# Patient Record
Sex: FEMALE | Race: ASIAN | NOT HISPANIC OR LATINO
[De-identification: names, ages, dates, MRNs, and addresses within clinical notes are randomized per-mention and may not be internally consistent; named-entity substitution may affect disease eponyms.]

---

## 2017-05-12 ENCOUNTER — APPOINTMENT (OUTPATIENT)
Dept: ELECTROPHYSIOLOGY | Facility: CLINIC | Age: 67
End: 2017-05-12

## 2017-05-15 ENCOUNTER — NON-APPOINTMENT (OUTPATIENT)
Age: 67
End: 2017-05-15

## 2017-05-15 ENCOUNTER — APPOINTMENT (OUTPATIENT)
Dept: ELECTROPHYSIOLOGY | Facility: CLINIC | Age: 67
End: 2017-05-15

## 2017-05-15 VITALS
HEART RATE: 66 BPM | SYSTOLIC BLOOD PRESSURE: 132 MMHG | OXYGEN SATURATION: 95 % | HEIGHT: 63 IN | DIASTOLIC BLOOD PRESSURE: 82 MMHG

## 2018-12-10 ENCOUNTER — APPOINTMENT (OUTPATIENT)
Dept: ELECTROPHYSIOLOGY | Facility: CLINIC | Age: 68
End: 2018-12-10

## 2019-01-02 ENCOUNTER — APPOINTMENT (OUTPATIENT)
Dept: ELECTROPHYSIOLOGY | Facility: CLINIC | Age: 69
End: 2019-01-02
Payer: COMMERCIAL

## 2019-01-02 VITALS
SYSTOLIC BLOOD PRESSURE: 137 MMHG | HEIGHT: 63 IN | WEIGHT: 217 LBS | BODY MASS INDEX: 38.45 KG/M2 | DIASTOLIC BLOOD PRESSURE: 80 MMHG | HEART RATE: 60 BPM | OXYGEN SATURATION: 97 %

## 2019-01-02 PROCEDURE — 99214 OFFICE O/P EST MOD 30 MIN: CPT

## 2019-01-02 PROCEDURE — 93289 INTERROG DEVICE EVAL HEART: CPT

## 2019-01-02 RX ORDER — METOPROLOL SUCCINATE 100 MG/1
100 TABLET, EXTENDED RELEASE ORAL DAILY
Refills: 0 | Status: ACTIVE | COMMUNITY

## 2019-01-02 RX ORDER — ENALAPRIL MALEATE 10 MG/1
10 TABLET ORAL TWICE DAILY
Qty: 180 | Refills: 3 | Status: ACTIVE | COMMUNITY

## 2019-01-02 RX ORDER — ISOSORBIDE DINITRATE 20 MG/1
20 TABLET ORAL
Qty: 270 | Refills: 3 | Status: ACTIVE | COMMUNITY

## 2019-01-02 RX ORDER — LINAGLIPTIN 5 MG/1
5 TABLET, FILM COATED ORAL DAILY
Refills: 0 | Status: ACTIVE | COMMUNITY

## 2019-01-02 RX ORDER — LEVOTHYROXINE SODIUM 0.03 MG/1
25 TABLET ORAL DAILY
Qty: 90 | Refills: 3 | Status: ACTIVE | COMMUNITY

## 2019-01-02 NOTE — REVIEW OF SYSTEMS
[Feeling Fatigued] : feeling fatigued [see HPI] : see HPI [Anxiety] : anxiety [Negative] : Heme/Lymph

## 2019-01-02 NOTE — PHYSICAL EXAM
[General Appearance - Well Developed] : well developed [General Appearance - Well Nourished] : well nourished [Normal Conjunctiva] : the conjunctiva exhibited no abnormalities [No Oral Pallor] : no oral pallor [No Oral Cyanosis] : no oral cyanosis [Normal Jugular Venous V Waves Present] : normal jugular venous V waves present [Respiration, Rhythm And Depth] : normal respiratory rhythm and effort [Exaggerated Use Of Accessory Muscles For Inspiration] : no accessory muscle use [Auscultation Breath Sounds / Voice Sounds] : lungs were clear to auscultation bilaterally [Heart Rate And Rhythm] : heart rate and rhythm were normal [Heart Sounds] : normal S1 and S2 [Murmurs] : no murmurs present [Arterial Pulses Normal] : the arterial pulses were normal [Bowel Sounds] : normal bowel sounds [Abdomen Soft] : soft [Abdomen Tenderness] : non-tender [Abnormal Walk] : normal gait [Gait - Sufficient For Exercise Testing] : the gait was sufficient for exercise testing [Nail Clubbing] : no clubbing of the fingernails [Cyanosis, Localized] : no localized cyanosis [Skin Color & Pigmentation] : normal skin color and pigmentation [] : no rash [Oriented To Time, Place, And Person] : oriented to person, place, and time [No Anxiety] : not feeling anxious [FreeTextEntry1] : 2+ carotids

## 2019-01-02 NOTE — DISCUSSION/SUMMARY
[FreeTextEntry1] : Ms. Caro is a 68 year old female with a history of  coronary artery disease status post PCI, MI, HTN, HLD, and sustained VT s/p ICD (2/2013) for secondary prevention, status post lead revision. \par Interrogation of her device today shows normal function. Software upgrade was performed today for early alert in the event of a premature battery issue. She remains with her own intrinsic rhythm without appropriate therapy in many years. She will follow up in 3 months.

## 2019-04-04 ENCOUNTER — APPOINTMENT (OUTPATIENT)
Dept: ELECTROPHYSIOLOGY | Facility: CLINIC | Age: 69
End: 2019-04-04

## 2020-02-09 ENCOUNTER — INPATIENT (INPATIENT)
Facility: HOSPITAL | Age: 70
LOS: 1 days | Discharge: HOME | DRG: 247 | End: 2020-02-11
Attending: INTERNAL MEDICINE | Admitting: INTERNAL MEDICINE
Payer: MEDICARE

## 2020-02-09 VITALS
WEIGHT: 220.9 LBS | RESPIRATION RATE: 16 BRPM | OXYGEN SATURATION: 96 % | HEART RATE: 61 BPM | TEMPERATURE: 98 F | DIASTOLIC BLOOD PRESSURE: 83 MMHG | SYSTOLIC BLOOD PRESSURE: 189 MMHG

## 2020-02-09 DIAGNOSIS — E11.9 TYPE 2 DIABETES MELLITUS WITHOUT COMPLICATIONS: ICD-10-CM

## 2020-02-09 DIAGNOSIS — Z95.5 PRESENCE OF CORONARY ANGIOPLASTY IMPLANT AND GRAFT: Chronic | ICD-10-CM

## 2020-02-09 DIAGNOSIS — Z29.9 ENCOUNTER FOR PROPHYLACTIC MEASURES, UNSPECIFIED: ICD-10-CM

## 2020-02-09 DIAGNOSIS — R07.9 CHEST PAIN, UNSPECIFIED: ICD-10-CM

## 2020-02-09 DIAGNOSIS — E03.9 HYPOTHYROIDISM, UNSPECIFIED: ICD-10-CM

## 2020-02-09 DIAGNOSIS — I10 ESSENTIAL (PRIMARY) HYPERTENSION: ICD-10-CM

## 2020-02-09 DIAGNOSIS — Z91.89 OTHER SPECIFIED PERSONAL RISK FACTORS, NOT ELSEWHERE CLASSIFIED: ICD-10-CM

## 2020-02-09 DIAGNOSIS — I25.10 ATHEROSCLEROTIC HEART DISEASE OF NATIVE CORONARY ARTERY WITHOUT ANGINA PECTORIS: ICD-10-CM

## 2020-02-09 LAB
BLD GP AB SCN SERPL QL: NEGATIVE — SIGNIFICANT CHANGE UP
CHOLEST SERPL-MCNC: 170 MG/DL — SIGNIFICANT CHANGE UP (ref 10–199)
CK MB CFR SERPL CALC: 1.1 NG/ML — SIGNIFICANT CHANGE UP (ref 0–6.7)
CK SERPL-CCNC: 85 U/L — SIGNIFICANT CHANGE UP (ref 25–170)
GLUCOSE BLDC GLUCOMTR-MCNC: 145 MG/DL — HIGH (ref 70–99)
HBA1C BLD-MCNC: 7 % — HIGH (ref 4–5.6)
HDLC SERPL-MCNC: 78 MG/DL — SIGNIFICANT CHANGE UP
LIPID PNL WITH DIRECT LDL SERPL: 74 MG/DL — SIGNIFICANT CHANGE UP
NT-PROBNP SERPL-SCNC: 401 PG/ML — HIGH (ref 0–300)
RH IG SCN BLD-IMP: POSITIVE — SIGNIFICANT CHANGE UP
TOTAL CHOLESTEROL/HDL RATIO MEASUREMENT: 2.2 RATIO — LOW (ref 3.3–7.1)
TRIGL SERPL-MCNC: 90 MG/DL — SIGNIFICANT CHANGE UP (ref 10–149)
TROPONIN T SERPL-MCNC: <0.01 NG/ML — SIGNIFICANT CHANGE UP (ref 0–0.01)
TSH SERPL-MCNC: 4.47 UIU/ML — SIGNIFICANT CHANGE UP (ref 0.35–4.94)

## 2020-02-09 PROCEDURE — 99285 EMERGENCY DEPT VISIT HI MDM: CPT

## 2020-02-09 PROCEDURE — 71045 X-RAY EXAM CHEST 1 VIEW: CPT | Mod: 26

## 2020-02-09 PROCEDURE — 99221 1ST HOSP IP/OBS SF/LOW 40: CPT

## 2020-02-09 RX ORDER — DEXTROSE 50 % IN WATER 50 %
15 SYRINGE (ML) INTRAVENOUS ONCE
Refills: 0 | Status: DISCONTINUED | OUTPATIENT
Start: 2020-02-09 | End: 2020-02-11

## 2020-02-09 RX ORDER — DEXTROSE 50 % IN WATER 50 %
25 SYRINGE (ML) INTRAVENOUS ONCE
Refills: 0 | Status: DISCONTINUED | OUTPATIENT
Start: 2020-02-09 | End: 2020-02-11

## 2020-02-09 RX ORDER — OMEGA-3 ACID ETHYL ESTERS 1 G
1 CAPSULE ORAL DAILY
Refills: 0 | Status: DISCONTINUED | OUTPATIENT
Start: 2020-02-10 | End: 2020-02-11

## 2020-02-09 RX ORDER — DEXTROSE 50 % IN WATER 50 %
12.5 SYRINGE (ML) INTRAVENOUS ONCE
Refills: 0 | Status: DISCONTINUED | OUTPATIENT
Start: 2020-02-09 | End: 2020-02-11

## 2020-02-09 RX ORDER — NIFEDIPINE 30 MG
60 TABLET, EXTENDED RELEASE 24 HR ORAL DAILY
Refills: 0 | Status: DISCONTINUED | OUTPATIENT
Start: 2020-02-10 | End: 2020-02-11

## 2020-02-09 RX ORDER — ASPIRIN/CALCIUM CARB/MAGNESIUM 324 MG
325 TABLET ORAL ONCE
Refills: 0 | Status: COMPLETED | OUTPATIENT
Start: 2020-02-09 | End: 2020-02-09

## 2020-02-09 RX ORDER — PREGABALIN 225 MG/1
1000 CAPSULE ORAL DAILY
Refills: 0 | Status: DISCONTINUED | OUTPATIENT
Start: 2020-02-09 | End: 2020-02-11

## 2020-02-09 RX ORDER — GLUCAGON INJECTION, SOLUTION 0.5 MG/.1ML
1 INJECTION, SOLUTION SUBCUTANEOUS ONCE
Refills: 0 | Status: DISCONTINUED | OUTPATIENT
Start: 2020-02-09 | End: 2020-02-11

## 2020-02-09 RX ORDER — ENOXAPARIN SODIUM 100 MG/ML
40 INJECTION SUBCUTANEOUS EVERY 24 HOURS
Refills: 0 | Status: DISCONTINUED | OUTPATIENT
Start: 2020-02-09 | End: 2020-02-11

## 2020-02-09 RX ORDER — ASPIRIN/CALCIUM CARB/MAGNESIUM 324 MG
81 TABLET ORAL EVERY 24 HOURS
Refills: 0 | Status: DISCONTINUED | OUTPATIENT
Start: 2020-02-10 | End: 2020-02-11

## 2020-02-09 RX ORDER — SODIUM CHLORIDE 9 MG/ML
1000 INJECTION, SOLUTION INTRAVENOUS
Refills: 0 | Status: DISCONTINUED | OUTPATIENT
Start: 2020-02-09 | End: 2020-02-11

## 2020-02-09 RX ORDER — ISOSORBIDE MONONITRATE 60 MG/1
30 TABLET, EXTENDED RELEASE ORAL DAILY
Refills: 0 | Status: DISCONTINUED | OUTPATIENT
Start: 2020-02-09 | End: 2020-02-10

## 2020-02-09 RX ORDER — CLOPIDOGREL BISULFATE 75 MG/1
75 TABLET, FILM COATED ORAL DAILY
Refills: 0 | Status: DISCONTINUED | OUTPATIENT
Start: 2020-02-10 | End: 2020-02-11

## 2020-02-09 RX ORDER — ISOSORBIDE MONONITRATE 60 MG/1
30 TABLET, EXTENDED RELEASE ORAL DAILY
Refills: 0 | Status: DISCONTINUED | OUTPATIENT
Start: 2020-02-10 | End: 2020-02-11

## 2020-02-09 RX ORDER — ATORVASTATIN CALCIUM 80 MG/1
80 TABLET, FILM COATED ORAL AT BEDTIME
Refills: 0 | Status: DISCONTINUED | OUTPATIENT
Start: 2020-02-09 | End: 2020-02-11

## 2020-02-09 RX ORDER — NITROGLYCERIN 6.5 MG
0.4 CAPSULE, EXTENDED RELEASE ORAL
Refills: 0 | Status: DISCONTINUED | OUTPATIENT
Start: 2020-02-09 | End: 2020-02-10

## 2020-02-09 RX ORDER — CLOPIDOGREL BISULFATE 75 MG/1
600 TABLET, FILM COATED ORAL ONCE
Refills: 0 | Status: COMPLETED | OUTPATIENT
Start: 2020-02-09 | End: 2020-02-09

## 2020-02-09 RX ORDER — LEVOTHYROXINE SODIUM 125 MCG
25 TABLET ORAL
Refills: 0 | Status: DISCONTINUED | OUTPATIENT
Start: 2020-02-10 | End: 2020-02-11

## 2020-02-09 RX ORDER — CLOPIDOGREL BISULFATE 75 MG/1
600 TABLET, FILM COATED ORAL ONCE
Refills: 0 | Status: DISCONTINUED | OUTPATIENT
Start: 2020-02-09 | End: 2020-02-09

## 2020-02-09 RX ORDER — INSULIN LISPRO 100/ML
VIAL (ML) SUBCUTANEOUS
Refills: 0 | Status: DISCONTINUED | OUTPATIENT
Start: 2020-02-09 | End: 2020-02-11

## 2020-02-09 RX ORDER — METOPROLOL TARTRATE 50 MG
50 TABLET ORAL
Refills: 0 | Status: DISCONTINUED | OUTPATIENT
Start: 2020-02-10 | End: 2020-02-11

## 2020-02-09 RX ADMIN — ENOXAPARIN SODIUM 40 MILLIGRAM(S): 100 INJECTION SUBCUTANEOUS at 20:37

## 2020-02-09 RX ADMIN — ATORVASTATIN CALCIUM 80 MILLIGRAM(S): 80 TABLET, FILM COATED ORAL at 20:37

## 2020-02-09 RX ADMIN — Medication 325 MILLIGRAM(S): at 17:11

## 2020-02-09 RX ADMIN — Medication 0.4 MILLIGRAM(S): at 23:54

## 2020-02-09 RX ADMIN — CLOPIDOGREL BISULFATE 600 MILLIGRAM(S): 75 TABLET, FILM COATED ORAL at 23:09

## 2020-02-09 RX ADMIN — ISOSORBIDE MONONITRATE 30 MILLIGRAM(S): 60 TABLET, EXTENDED RELEASE ORAL at 23:09

## 2020-02-09 NOTE — H&P ADULT - PROBLEM SELECTOR PLAN 3
-c/w lovenox ppx    #FENGI  -DASH diet  -replete gudelia prn    #Dispo: pending stabilization    FULL CODE -holding home farxiga and metformin  -c/w MISS; will dose basal and premeal as needed  -f/u a1c

## 2020-02-09 NOTE — ED PROVIDER NOTE - CLINICAL SUMMARY MEDICAL DECISION MAKING FREE TEXT BOX
Pt w chest pain, none at present but w low exercise tolerance, cp relieve w nitro. Pt w hx of CAD w chest pain worsening on exertion, none at present on rest, cp relieve w nitro. will obtain cardiac evaluation and likely admit for further ACS evaluation.

## 2020-02-09 NOTE — H&P ADULT - PROBLEM SELECTOR PLAN 1
S/p ASA load for possible ACS; trops neg x1, ECG w/ TWI in ; CP currently resolved  -c/w SLN prn  -f/u trops/ECG  x2 S/p ASA load for possible ACS; trops neg x1, ECG w/ sinus joya, TWI in aVL and V2; CP currently resolved  -c/w SLN prn  -f/u trops/ECG  x2  -NPO at midnight for likely cath tomorrow given high pretest probability S/p ASA load for possible ACS; trops neg x1, ECG w/ sinus joya, TWI in aVL and V2; CP currently resolved  -c/w SLN prn  -c/w imdur 30mg qd  -f/u trops/ECG  x2  -NPO at midnight for likely cath tomorrow given high pretest probability S/p ASA load for possible ACS; trops neg x1, ECG w/ sinus joya, TWI in aVL and V2; CP currently resolved  -c/w SLN prn  -c/w imdur 30mg qd  -f/u trops/ECG  x2, BNP  -NPO at midnight for likely cath tomorrow given high pretest probability

## 2020-02-09 NOTE — H&P ADULT - PROBLEM SELECTOR PLAN 4
1) PCP Contacted on Admission: (N) --> Name & Phone #:  2) Date of Contact with PCP:  3) PCP Contacted at Discharge: (Y/N, N/A)  4) Summary of Handoff Given to PCP:   5) Post-Discharge Appointment Date and Location: -holding home ACE (enalapril 10mg qd) as pt to undergo cath  -holding home imdur 30mg ER as pt w/ SLN prn ordered  -c/w home nifedipine 60mg qd  -home toprol 100mg qd transitioned currently to lopressor 50mg bid -holding home ACE (enalapril 10mg qd) as pt to undergo cath  -c/w home nifedipine 60mg qd, imdur 30mg qd  -holding home toprol 100mg qd, continuing as lopressor 50mg bid

## 2020-02-09 NOTE — H&P ADULT - NSHPPHYSICALEXAM_GEN_ALL_CORE
VITAL SIGNS:  T(C): 36.6 (02-09-20 @ 18:43), Max: 36.6 (02-09-20 @ 18:43)  T(F): 97.8 (02-09-20 @ 18:43), Max: 97.8 (02-09-20 @ 18:43)  HR: 55 (02-09-20 @ 18:43) (55 - 61)  BP: 147/91 (02-09-20 @ 18:43) (147/91 - 189/83)  BP(mean): --  RR: 17 (02-09-20 @ 18:43) (16 - 17)  SpO2: 98% (02-09-20 @ 18:43) (96% - 98%)  Wt(kg): --    PHYSICAL EXAM:    Constitutional: WDWN resting comfortably in bed; NAD  Head: NC/AT  Eyes: PERRL, EOMI, anicteric sclera  ENT: no nasal discharge; uvula midline, no oropharyngeal erythema or exudates; MMM  Neck: supple; no JVD or thyromegaly  Respiratory: CTA B/L; no W/R/R, no retractions  Cardiac: +S1/S2; RRR; no M/R/G; PMI non-displaced  Gastrointestinal: soft, NT/ND; no rebound or guarding; +BSx4  Genitourinary: normal external genitalia  Back: spine midline, no bony tenderness or step-offs; no CVAT B/L  Extremities: WWP, no clubbing or cyanosis; no peripheral edema  Musculoskeletal: NROM x4; no joint swelling, tenderness or erythema  Vascular: 2+ radial, femoral, DP/PT pulses B/L  Dermatologic: skin warm, dry and intact; no rashes, wounds, or scars  Lymphatic: no submandibular or cervical LAD  Neurologic: AAOx3; CNII-XII grossly intact; no focal deficits  Psychiatric: affect and characteristics of appearance, verbalizations, behaviors are appropriate VITAL SIGNS:  T(C): 36.6 (02-09-20 @ 18:43), Max: 36.6 (02-09-20 @ 18:43)  T(F): 97.8 (02-09-20 @ 18:43), Max: 97.8 (02-09-20 @ 18:43)  HR: 55 (02-09-20 @ 18:43) (55 - 61)  BP: 147/91 (02-09-20 @ 18:43) (147/91 - 189/83)  BP(mean): --  RR: 17 (02-09-20 @ 18:43) (16 - 17)  SpO2: 98% (02-09-20 @ 18:43) (96% - 98%)  Wt(kg): --    PHYSICAL EXAM:    Constitutional: WDWN resting comfortably in bed; NAD  Head: NC/AT  Eyes: PERRL, EOMI, anicteric sclera  ENT: no nasal discharge; uvula midline, no oropharyngeal erythema or exudates; MMM  Neck: supple; no JVD or thyromegaly  Respiratory: CTA B/L; no W/R/R, no retractions  Cardiac: +S1/S2; RRR; no M/R/G, no JVD  Gastrointestinal: soft, NT/ND; no rebound or guarding; +BSx4  Back: spine midline, no bony tenderness or step-offs; no CVAT B/L  Extremities: WWP, no clubbing or cyanosis; no peripheral edema  Musculoskeletal: NROM x4; no joint swelling, tenderness or erythema  Vascular: 2+ radial, DP pulses B/L  Dermatologic: skin warm, dry and intact; no rashes, wounds, or scars  Lymphatic: no submandibular or cervical LAD  Neurologic: AAOx3; CNII-XII grossly intact; no focal deficits

## 2020-02-09 NOTE — H&P ADULT - HISTORY OF PRESENT ILLNESS
In the ED, T: 97.5, P: 61, BP: 189/83, RR: 16, O2: 96% RA. Trops neg x1. ECG w/ TWI in . Patient aspirin loaded and [facility]. Patient is a 70y F w/ PMH CAD (s/p 14-16 stents), HTN, DM2, hypothyroidism presenting w/ CP. Patient reports exertional CP for the last 3 days; L sided radiating to the jaw, relieved with nitrates or rest. Assoc w/ dyspnea. Denies f/c/n/v/d/c, dysuria, ab pain, recent travel, LE edema, sick contacts.     In the ED, T: 97.5, P: 61, BP: 189/83, RR: 16, O2: 96% RA. Trops neg x1. ECG w/ TWI in . Patient aspirin loaded and admitted to [facility]. Patient is a 70y F w/ PMH CAD (s/p 14-16 stents), HTN, DM2, hypothyroidism presenting w/ CP. Patient reports exertional CP for the last 3 days; L sided radiating to the jaw, relieved with nitrates or rest. Assoc w/ dyspnea. Denies f/c/n/v/d/c, dysuria, ab pain, recent travel, LE edema, sick contacts.     In the ED, T: 97.5, P: 61, BP: 189/83, RR: 16, O2: 96% RA. Trops neg x1. ECG w/ TWI in aVL and V2. Patient aspirin loaded and admitted to [facility].

## 2020-02-09 NOTE — H&P ADULT - ASSESSMENT
Patient is a 70y F w/ PMH CAD (s/p 14-16 stents), HTN, DM2 presenting w/ CP. Patient reports exertional CP for the last 3 days; L sided radiating to the jaw, relieved with nitrates or rest. Assoc w/ dyspnea. Denies f/c/n/v/d/c, dysuria, ab pain, recent travel, LE edema, sick contacts.

## 2020-02-09 NOTE — H&P ADULT - PROBLEM SELECTOR PLAN 6
-c/w lovenox ppx    #FENGI  -DASH/diabetic diet  -replete gudelia prn    #Dispo: pending stabilization    FULL CODE

## 2020-02-09 NOTE — H&P ADULT - NSHPLABSRESULTS_GEN_ALL_CORE
LABS:                        14.0   5.33  )-----------( 194      ( 09 Feb 2020 16:34 )             43.7     02-09    145  |  105  |  12  ----------------------------<  128<H>  4.6   |  25  |  0.70    Ca    9.4      09 Feb 2020 16:34    TPro  7.4  /  Alb  4.2  /  TBili  0.2  /  DBili  x   /  AST  28  /  ALT  26  /  AlkPhos  90  02-09    PT/INR - ( 09 Feb 2020 16:34 )   PT: 9.8 sec;   INR: 0.86          PTT - ( 09 Feb 2020 16:34 )  PTT:29.3 sec    CARDIAC MARKERS ( 09 Feb 2020 16:34 )  x     / <0.01 ng/mL / x     / x     / x        RADIOLOGY & ADDITIONAL TESTS:

## 2020-02-09 NOTE — H&P ADULT - PROBLEM SELECTOR PLAN 2
hx of multiple stents in the past  -c/w ASA qd, lipitor qd  -f/u lipid profile, a1c, TSH hx of multiple stents (14-16) in the past  -c/w ASA qd, lipitor qd, BB qd,   -f/u lipid profile, a1c, TSH hx of multiple stents (14-16) in the past  -c/w ASA qd, lipitor qd, BB qd, imdur qd  -f/u lipid profile, a1c, TSH

## 2020-02-09 NOTE — H&P ADULT - ATTENDING COMMENTS
On Date 2/9/20  Assessment: Patient personally seen and examined myself, face-to-face, during rounds with the Resident     Note read, including vitals, physical findings, laboratory data, and radiological reports.   Agree with above.

## 2020-02-09 NOTE — H&P ADULT - NSICDXPASTMEDICALHX_GEN_ALL_CORE_FT
PAST MEDICAL HISTORY:  CAD in native artery     DM2 (diabetes mellitus, type 2)     H/O: HTN (hypertension)     Hypothyroidism

## 2020-02-09 NOTE — ED ADULT NURSE NOTE - OBJECTIVE STATEMENT
pt a&ox3 resting in stretcher. cardiac monitor in place. nsr on monitor. pt family at bedside. pt reports cp x2 days. pain has increasingly worsened, and today when pt was trying to shower, it was unbearable. pt reports pain =9/10 with movement, 0/10 when still. pt denies trauma. denies sob, fevers, n,v,d. reports chest tightness. lungs clear b/l.

## 2020-02-09 NOTE — H&P ADULT - PROBLEM SELECTOR PLAN 7
1) PCP Contacted on Admission: (N) --> Name & Phone #: Dr. Dieudonne Arellano  2) Date of Contact with PCP:  3) PCP Contacted at Discharge: (Y/N, N/A)  4) Summary of Handoff Given to PCP:   5) Post-Discharge Appointment Date and Location: 1) PCP Contacted on Admission: (N) --> Name & Phone #: Dr. Dieudonne Arellano [phone]  2) Date of Contact with PCP:  3) PCP Contacted at Discharge: (Y/N, N/A)  4) Summary of Handoff Given to PCP:   5) Post-Discharge Appointment Date and Location:

## 2020-02-09 NOTE — ED PROVIDER NOTE - OBJECTIVE STATEMENT
71 yo hx CAD w 14 stents per pt w chest pain worsening over the last three days.  has been using nitro to relieve symptoms but today noted that pain after walking a few steps. no sob, leg swelling, cough, f/c, states compliance w medications. Denies associated SOB, NVD, lightheaded, diaphoresis, palpitations, cough/rhinorrhea, black/bloody stool, LE pain/swelling, focal weakness/numbness, recent travel/immobilization, abd pain, urinary complaints, f/c. No hormone use.  has had stents placed at [facility] previously.

## 2020-02-10 ENCOUNTER — TRANSCRIPTION ENCOUNTER (OUTPATIENT)
Age: 70
End: 2020-02-10

## 2020-02-10 DIAGNOSIS — Z86.79 PERSONAL HISTORY OF OTHER DISEASES OF THE CIRCULATORY SYSTEM: ICD-10-CM

## 2020-02-10 DIAGNOSIS — E11.9 TYPE 2 DIABETES MELLITUS WITHOUT COMPLICATIONS: ICD-10-CM

## 2020-02-10 DIAGNOSIS — I50.22 CHRONIC SYSTOLIC (CONGESTIVE) HEART FAILURE: ICD-10-CM

## 2020-02-10 DIAGNOSIS — E78.5 HYPERLIPIDEMIA, UNSPECIFIED: ICD-10-CM

## 2020-02-10 LAB
ANION GAP SERPL CALC-SCNC: 12 MMOL/L — SIGNIFICANT CHANGE UP (ref 5–17)
APTT BLD: 32 SEC — SIGNIFICANT CHANGE UP (ref 27.5–36.3)
BLD GP AB SCN SERPL QL: NEGATIVE — SIGNIFICANT CHANGE UP
BUN SERPL-MCNC: 14 MG/DL — SIGNIFICANT CHANGE UP (ref 7–23)
CALCIUM SERPL-MCNC: 8.8 MG/DL — SIGNIFICANT CHANGE UP (ref 8.4–10.5)
CHLORIDE SERPL-SCNC: 105 MMOL/L — SIGNIFICANT CHANGE UP (ref 96–108)
CO2 SERPL-SCNC: 27 MMOL/L — SIGNIFICANT CHANGE UP (ref 22–31)
CREAT SERPL-MCNC: 0.9 MG/DL — SIGNIFICANT CHANGE UP (ref 0.5–1.3)
GLUCOSE BLDC GLUCOMTR-MCNC: 111 MG/DL — HIGH (ref 70–99)
GLUCOSE BLDC GLUCOMTR-MCNC: 140 MG/DL — HIGH (ref 70–99)
GLUCOSE BLDC GLUCOMTR-MCNC: 144 MG/DL — HIGH (ref 70–99)
GLUCOSE BLDC GLUCOMTR-MCNC: 225 MG/DL — HIGH (ref 70–99)
GLUCOSE SERPL-MCNC: 152 MG/DL — HIGH (ref 70–99)
HCT VFR BLD CALC: 45 % — SIGNIFICANT CHANGE UP (ref 34.5–45)
HCV AB S/CO SERPL IA: 0.1 S/CO — SIGNIFICANT CHANGE UP
HCV AB SERPL-IMP: SIGNIFICANT CHANGE UP
HGB BLD-MCNC: 14.2 G/DL — SIGNIFICANT CHANGE UP (ref 11.5–15.5)
INR BLD: 0.86 — LOW (ref 0.88–1.16)
MAGNESIUM SERPL-MCNC: 2.2 MG/DL — SIGNIFICANT CHANGE UP (ref 1.6–2.6)
MCHC RBC-ENTMCNC: 28.4 PG — SIGNIFICANT CHANGE UP (ref 27–34)
MCHC RBC-ENTMCNC: 31.6 GM/DL — LOW (ref 32–36)
MCV RBC AUTO: 90 FL — SIGNIFICANT CHANGE UP (ref 80–100)
NRBC # BLD: 0 /100 WBCS — SIGNIFICANT CHANGE UP (ref 0–0)
PLATELET # BLD AUTO: 197 K/UL — SIGNIFICANT CHANGE UP (ref 150–400)
POTASSIUM SERPL-MCNC: 4.6 MMOL/L — SIGNIFICANT CHANGE UP (ref 3.5–5.3)
POTASSIUM SERPL-SCNC: 4.6 MMOL/L — SIGNIFICANT CHANGE UP (ref 3.5–5.3)
PROTHROM AB SERPL-ACNC: 9.7 SEC — LOW (ref 10–12.9)
RBC # BLD: 5 M/UL — SIGNIFICANT CHANGE UP (ref 3.8–5.2)
RBC # FLD: 14.2 % — SIGNIFICANT CHANGE UP (ref 10.3–14.5)
RH IG SCN BLD-IMP: POSITIVE — SIGNIFICANT CHANGE UP
SODIUM SERPL-SCNC: 144 MMOL/L — SIGNIFICANT CHANGE UP (ref 135–145)
WBC # BLD: 5.28 K/UL — SIGNIFICANT CHANGE UP (ref 3.8–10.5)
WBC # FLD AUTO: 5.28 K/UL — SIGNIFICANT CHANGE UP (ref 3.8–10.5)

## 2020-02-10 PROCEDURE — 71045 X-RAY EXAM CHEST 1 VIEW: CPT | Mod: 26

## 2020-02-10 PROCEDURE — 93010 ELECTROCARDIOGRAM REPORT: CPT

## 2020-02-10 PROCEDURE — 93459 L HRT ART/GRFT ANGIO: CPT | Mod: 26,59

## 2020-02-10 PROCEDURE — 99232 SBSQ HOSP IP/OBS MODERATE 35: CPT

## 2020-02-10 PROCEDURE — 92937 PRQ TRLUML REVSC CAB GRF 1: CPT | Mod: LC

## 2020-02-10 RX ORDER — SODIUM CHLORIDE 9 MG/ML
500 INJECTION INTRAMUSCULAR; INTRAVENOUS; SUBCUTANEOUS
Refills: 0 | Status: DISCONTINUED | OUTPATIENT
Start: 2020-02-10 | End: 2020-02-11

## 2020-02-10 RX ORDER — ACETAMINOPHEN 500 MG
650 TABLET ORAL ONCE
Refills: 0 | Status: COMPLETED | OUTPATIENT
Start: 2020-02-10 | End: 2020-02-10

## 2020-02-10 RX ORDER — SODIUM CHLORIDE 9 MG/ML
500 INJECTION INTRAMUSCULAR; INTRAVENOUS; SUBCUTANEOUS
Refills: 0 | Status: DISCONTINUED | OUTPATIENT
Start: 2020-02-10 | End: 2020-02-10

## 2020-02-10 RX ADMIN — SODIUM CHLORIDE 50 MILLILITER(S): 9 INJECTION INTRAMUSCULAR; INTRAVENOUS; SUBCUTANEOUS at 19:35

## 2020-02-10 RX ADMIN — ATORVASTATIN CALCIUM 80 MILLIGRAM(S): 80 TABLET, FILM COATED ORAL at 21:03

## 2020-02-10 RX ADMIN — Medication 4: at 22:12

## 2020-02-10 RX ADMIN — PREGABALIN 1000 MICROGRAM(S): 225 CAPSULE ORAL at 10:00

## 2020-02-10 RX ADMIN — Medication 650 MILLIGRAM(S): at 21:32

## 2020-02-10 RX ADMIN — Medication 60 MILLIGRAM(S): at 10:00

## 2020-02-10 RX ADMIN — SODIUM CHLORIDE 50 MILLILITER(S): 9 INJECTION INTRAMUSCULAR; INTRAVENOUS; SUBCUTANEOUS at 10:21

## 2020-02-10 RX ADMIN — ISOSORBIDE MONONITRATE 30 MILLIGRAM(S): 60 TABLET, EXTENDED RELEASE ORAL at 10:00

## 2020-02-10 RX ADMIN — Medication 1 GRAM(S): at 10:00

## 2020-02-10 RX ADMIN — Medication 650 MILLIGRAM(S): at 20:34

## 2020-02-10 RX ADMIN — Medication 25 MICROGRAM(S): at 06:22

## 2020-02-10 RX ADMIN — CLOPIDOGREL BISULFATE 75 MILLIGRAM(S): 75 TABLET, FILM COATED ORAL at 15:45

## 2020-02-10 RX ADMIN — Medication 50 MILLIGRAM(S): at 19:19

## 2020-02-10 RX ADMIN — Medication 50 MILLIGRAM(S): at 06:18

## 2020-02-10 RX ADMIN — Medication 81 MILLIGRAM(S): at 10:00

## 2020-02-10 NOTE — PROGRESS NOTE ADULT - PROBLEM SELECTOR PLAN 6
-Continue Synthroid 25 mcg daily.    VTE PPx: Lovenox SC daily (to start tomorrow given angiogram today    Dispo: F/U Cardiac Catheterization results and Echo    Case discussed with Dr. Martinez, patient and daughter. -Continue Synthroid 25 mcg daily.    VTE PPx: Lovenox SC daily     Dispo: F/U Cardiac Catheterization results and Echo    Case discussed with Dr. Martinez, patient and daughter.

## 2020-02-10 NOTE — DISCHARGE NOTE PROVIDER - PROVIDER TOKENS
FREE:[LAST:[Isa],FIRST:[Pritesh],PHONE:[phone]],FAX:[(   )    -],ADDRESS:[[address] [place] [address]  [phone]],FOLLOWUP:[1 week]]

## 2020-02-10 NOTE — DISCHARGE NOTE PROVIDER - HOSPITAL COURSE
69 yo Simone and English speaking female with a PMHx of HTN, hyperlipidemia, DMII, hypothyroidism, known CAD with prior 3VCABG 2013 (LIMA-LAD, SVG-OM1, SVG-RCA (all patent by angiogram 2013), multiple PCIs most recent intervention s/p MATTHEW proximal ramus 2/2016 @ [facility], chronic systolic CHF (no recent EF documented), history of VT s/p ICD (with most recent interrogation Friday 2/7/2020) presented to [facility] ED (2/9/20) with unstable angina with admission to [facility] for further work up. Patient’s troponin negative x2. Patient is now s/p cardiac catheterization (2/10/2020) revealing (INSERT).  Echo (2/11/2020) revealed (INSERT).         Patient seen and examined at bedside this morning. Patient with no complaints and is out of bed ambulating with no issues. Left radial access is stable with no hematoma, no bleed, 2+ radial pulse. Lab values, telemetry and vital signs reviewed and remained stable overnight. Discharge medication regimen reviewed with Dr. Martinez with plan for patient to take Aspirin 81 mg daily, Plavix 75 mg daily, Imdur 30 mg daily (May need to increase—confirm), Lopressor 50 mg orally BID, Procardia XL 60 mg daily, Enalapril 10 mg daily and Lipitor 80 mg daily. Patient has been cleared for discharge at this time and will follow up with Dr. Moss within 1-2 weeks for a post PCI check up. All discharge instructions have been reviewed with patient and medications have been e-prescribed to patient’s pharmacy. 71 yo Simone and English speaking female with a PMHx of HTN, hyperlipidemia, DMII, hypothyroidism, known CAD with prior 3VCABG 2013 (LIMA-LAD, SVG-OM1, SVG-RCA (all patent by angiogram 2013), multiple PCIs most recent intervention s/p MATTHEW proximal ramus 2/2016 @ [facility], chronic systolic CHF (no recent EF documented), history of VT s/p ICD (with most recent interrogation Friday 2/7/2020) presented to [facility] ED (2/9/20) with unstable angina with admission to [facility] for further work up. Patient’s troponin negative x2. Patient is now s/p cardiac catheterization (2/10/2020) revealing (INSERT).  Echo (2/11/2020) revealed (INSERT).         Patient seen and examined at bedside this morning. Patient with no complaints and is out of bed ambulating with no issues. Left radial access is stable with no hematoma, no bleed, 2+ radial pulse. Lab values, telemetry and vital signs reviewed and remained stable overnight. Discharge medication regimen reviewed with Dr. Martinez with plan for patient to take Aspirin 81 mg daily, Plavix 75 mg daily, Imdur 30 mg daily (May need to increase—confirm), Toprol  mg daily,  Procardia XL 60 mg daily, Enalapril 10 mg daily and Lipitor 80 mg daily. Patient has been cleared for discharge at this time and will follow up with Dr. Moss within 1-2 weeks for a post PCI check up. All discharge instructions have been reviewed with patient and medications have been e-prescribed to patient’s pharmacy. 71 yo Simone and English speaking female with a PMHx of HTN, hyperlipidemia, DMII, hypothyroidism, known CAD with prior 3VCABG 2013 (LIMA-LAD, SVG-OM1, SVG-RCA (all patent by angiogram 2013), multiple PCIs most recent intervention s/p MATTHEW proximal ramus 2/2016 @ [facility], chronic systolic CHF (no recent EF documented), history of VT s/p ICD (with most recent interrogation Friday 2/7/2020) presented to [facility] ED (2/9/20) with unstable angina with admission to [facility] for further work up. Patient’s troponin negative x2. Patient is now s/p cardiac catheterization (2/10/2020) revealing dLMCA 70%, pLAD 100%, pRamus 100%, pLCx 100%, mRCA 100%, LIMA-LAD patent, SVG-RPDA patent, SVG-OM1 99% s/p MATTHEW, L radial.  Echo (2/11/2020) revealed ____.         Patient seen and examined at bedside this morning. Intermittent PVCs & SB in 30s-40s on telemetry overnight while sleeping.  Labs significant for hypokalemia to 3.9 which was repleted with KCl 20 mEq x1. Patient with no complaints and is out of bed ambulating with no issues. Left radial access with dime-size hematoma this morning with ooze that resolved with 5 minutes of manual compression. 2+ radial pulse. Lab values, telemetry and vital signs reviewed and remained stable overnight. Discharge medication regimen reviewed with Dr. Martinez with plan for patient to take Aspirin 81 mg daily, Plavix 75 mg daily, Imdur 30 mg daily, Toprol  mg daily,  Procardia XL 60 mg daily, Enalapril 10 mg daily and Lipitor 80 mg daily. Patient has been cleared for discharge at this time by Dr. Martinez and will follow up with Dr. Moss within 1-2 weeks for a post PCI check up. All discharge instructions have been reviewed with patient and medications have been e-prescribed to patient’s pharmacy.         Temp 98.3F, HR 62 BPM,  /63, RR 18, SpO2 95% on RA    Gen: NAD, A&O x3    Cards: RRR, clear S1 and S2 without murmur    Pulm: CTA B/L without w/r/r    L radial:  dime-size hematoma with ooze that resolved with 5 minutes manual pressure, scattered ecchymosis, 2+ radial pulse    Abd: soft, NT, BS + x4    Ext: no LE edema or ulcerations B/L 69 yo Simone and English speaking female with a PMHx of HTN, hyperlipidemia, DMII, hypothyroidism, known CAD with prior 3VCABG 2013 (LIMA-LAD, SVG-OM1, SVG-RCA (all patent by angiogram 2013), multiple PCIs most recent intervention s/p MATTHEW proximal ramus 2/2016 @ [facility], chronic systolic CHF (no recent EF documented), history of VT s/p ICD (with most recent interrogation Friday 2/7/2020) presented to [facility] ED (2/9/20) with unstable angina with admission to [facility] for further work up. Patient’s troponin negative x2. Patient is now s/p cardiac catheterization (2/10/2020) revealing dLMCA 70%, pLAD 100%, pRamus 100%, pLCx 100%, mRCA 100%, LIMA-LAD patent, SVG-RPDA patent, SVG-OM1 99% s/p MATTHEW, L radial.  Echo (2/11/2020) revealed normal LV size and systolic function, LVEF 71%, no regional WMA, abnormal septal motion seen due to previous cardiac surgery, mildly dilated RV, normal RVSF, moderate-severe TR, pulmonary HTN (42.9 mmHg), no pericardial effusion, R heart device leads. Patient seen and examined at bedside this morning. Intermittent PVCs & SB in 30s-40s on telemetry overnight while sleeping.  Labs significant for hypokalemia to 3.9 which was repleted with KCl 20 mEq x1. Patient with no complaints and is out of bed ambulating with no issues. Left radial access with dime-size hematoma this morning with ooze that resolved with 5 minutes of manual compression. 2+ radial pulse. Lab values, telemetry and vital signs reviewed and remained stable overnight. Discharge medication regimen reviewed with Dr. Martinez with plan for patient to take Aspirin 81 mg daily, Plavix 75 mg daily, Imdur 30 mg daily, Toprol  mg daily,  Procardia XL 60 mg daily, Enalapril 10 mg daily and Lipitor 80 mg daily. No need for CTS consult for moderate-severe TR at this time as per Dr. Martinez. Patient has been cleared for discharge at this time by Dr. Martinez and will follow up with Dr. Moss within 1-2 weeks for a post PCI check up. All discharge instructions have been reviewed with patient and medications have been e-prescribed to patient’s pharmacy.         Temp 98.3F, HR 62 BPM,  /63, RR 18, SpO2 95% on RA    Gen: NAD, A&O x3    Cards: RRR, clear S1 and S2 without murmur    Pulm: CTA B/L without w/r/r    L radial:  dime-size hematoma with ooze that resolved with 5 minutes manual pressure, scattered ecchymosis, 2+ radial pulse    Abd: soft, NT, BS + x4    Ext: no LE edema or ulcerations B/L 69 yo Simone and English speaking female with a PMHx of HTN, hyperlipidemia, DMII, hypothyroidism, known CAD with prior 3VCABG 2013 (LIMA-LAD, SVG-OM1, SVG-RCA (all patent by angiogram 2013), multiple PCIs most recent intervention s/p MATTHEW proximal ramus 2/2016 @ [facility], chronic systolic CHF (no recent EF documented), history of VT s/p ICD (with most recent interrogation Friday 2/7/2020) presented to [facility] ED (2/9/20) with unstable angina with admission to [facility] for further work up. Patient’s troponin negative x2. Patient is now s/p cardiac catheterization (2/10/2020) revealing dLMCA 70%, pLAD 100%, pRamus 100%, pLCx 100%, mRCA 100%, LIMA-LAD patent, SVG-RPDA patent, SVG-OM1 99% s/p MATTHEW, L radial.  Echo (2/11/2020) revealed normal LV size and systolic function, LVEF 71%, no regional WMA, abnormal septal motion seen due to previous cardiac surgery, mildly dilated RV, normal RVSF, moderate-severe TR, pulmonary HTN (42.9 mmHg), no pericardial effusion, R heart device leads. Patient seen and examined at bedside this morning. Intermittent PVCs & SB in 30s-40s on telemetry overnight while sleeping.  Labs significant for hypokalemia to 3.9 which was repleted with KCl 20 mEq x1. Patient with no complaints and is out of bed ambulating with no issues. Left radial access with dime-size hematoma this morning with ooze that resolved with 5 minutes of manual compression. 2+ radial pulse. Lab values, telemetry and vital signs reviewed and remained stable overnight. Discharge medication regimen reviewed with Dr. Martinez with plan for patient to take Aspirin 81 mg daily, Plavix 75 mg daily, Imdur 30 mg daily, Toprol  mg daily,  Procardia XL 60 mg daily, Enalapril 10 mg daily.  No need to change Toprol  mg PO QD even though noted SB 30s-40s overnight as d/w Dr. Martinez. LDL 74 on Atorvastatin 10 mg PO QD as outpatient. Will discharge on Atorvastatin 40 mg PO QD as d/w Dr. Martinez No need for CTS consult for moderate-severe TR at this time as per Dr. Martinez. Patient has been cleared for discharge at this time by Dr. Martinez and will follow up with Dr. Moss within 1-2 weeks for a post PCI check up.  All discharge instructions have been reviewed with patient and medications have been e-prescribed to patient’s pharmacy.         Temp 98.3F, HR 62 BPM,  /63, RR 18, SpO2 95% on RA    Gen: NAD, A&O x3    Cards: RRR, clear S1 and S2 without murmur    Pulm: CTA B/L without w/r/r    L radial:  dime-size hematoma with ooze that resolved with 5 minutes manual pressure, scattered ecchymosis, 2+ radial pulse    Abd: soft, NT, BS + x4    Ext: no LE edema or ulcerations B/L

## 2020-02-10 NOTE — PROGRESS NOTE ADULT - ATTENDING COMMENTS
On Date 2/10/20  Assessment: Patient personally seen and examined myself during rounds, face-to-face, with the NPP     Note read, including vitals, physical findings, laboratory data, and radiological reports.   Agree with above with revisions, if any included below.   - My exam:  General: WN/WD NAD  Neurology: A&Ox3, nonfocal, WEINER x 4  Head:  Normocephalic, atraumatic  ENT:  Mucosa moist, no ulcerations  Neck:  Supple, no sinuses or palpable masses  Lymphatic:  No palpable cervical, supraclavicular, axillary or inguinal adenopathy  Respiratory: CTA B/L  CV: RRR, S1S2, no murmur  Abdominal: Soft, NT, ND no palpable mass  MSK: No edema, + peripheral pulses, FROM all 4 extremity  Incisions: intact, no erythema or drainage    - My Plan of care:   Continuous telemetry monitoring, frequent hemodynamic checks, daily weights, I/Os, daily 12 Lead ECG, add K and Mg to labs, cycle troponin to peak, consider Adv HF / EP / CTS / Interv. Cardio consultation if intervention is needed.

## 2020-02-10 NOTE — DISCHARGE NOTE PROVIDER - CARE PROVIDER_API CALL
Pritesh Moss  [address] [address]  [phone]  Phone: [phone]  Fax: (   )    -  Follow Up Time: 1 week

## 2020-02-10 NOTE — PROGRESS NOTE ADULT - PROBLEM SELECTOR PLAN 2
-SBP this AM 180s mmHG  -Continue Imdur 30 mg daily, Lopressor 50 mg orally BID, Procardia XL 60 mg daily  -Holding home Enalapril 10 mg daily in the setting of upcoming dye load   -All home meds given this AM; will up titrate Imdur if SBP remains elevated.

## 2020-02-10 NOTE — PROGRESS NOTE ADULT - ASSESSMENT
71 yo Simone and English speaking female with a PMHx of HTN, hyperlipidemia, DMII, hypothyroidism, known CAD with prior 3VCABG 2013 (LIMA-LAD, SVG-OM1, SVG-RCA (all patent by angiogram 2013), multiple PCIs most recent intervention s/p MATTHEW proximal ramus 2/2016 @ [facility], chronic systolic CHF (no recent EF documented), history of VT s/p ICD (with most recent interrogation Friday 2/7/2020) presented to [facility] ED (2/9/20) with unstable angina with admission to [facility] for cardiac catheterization with possible intervention if clinically indicated.

## 2020-02-10 NOTE — PROGRESS NOTE ADULT - PROBLEM SELECTOR PLAN 1
-Outpatient cardiologist: Dr. Pritesh Moss [phone]  -Troponin negative x2. Patient did have chest pain overnight requiring SL NTG x one dose with relief.  Currently chest pain free this AM.   -s/p 3VCABG 2013 (LIMA-LAD, SVG-OM1, SVG-RCA (all patent by angiogram 2013), multiple PCIs. Most recent record is from 2/2013 @ [facility] s/p PCI proximal Ramus (Called [facility] for records as well, put in chart).   -Plan for cardiac catheterization with possible intervention if clinically indicated. Precath/consented. Aspirin/Plavix loaded 2/9/2020.   -Echo ordered  -Continue Lipitor 80 mg daily, Imdur 30 mg daily, Lopressor 50 mg orally BID, Procardia XL 60 mg daily. -Outpatient cardiologist: Dr. Pritesh Moss [phone]  -Troponin negative x2. Patient did have chest pain overnight requiring SL NTG x one dose with relief.  Currently chest pain free this AM.   -s/p 3VCABG 2013 (LIMA-LAD, SVG-OM1, SVG-RCA (all patent by angiogram 2013), multiple PCIs. Most recent record is from 2/2013 @ [facility] s/p PCI proximal Ramus (Called [facility] for records as well, put in chart).   -Plan for cardiac catheterization with possible intervention if clinically indicated. Precath/consented. Aspirin/Plavix loaded 2/9/2020.   -Echo ordered  -Continue Aspirin 81 mg daily, Plavix 75 mg daily, Lipitor 80 mg daily, Imdur 30 mg daily, Lopressor 50 mg orally BID, Procardia XL 60 mg daily.

## 2020-02-10 NOTE — DISCHARGE NOTE PROVIDER - NSDCMRMEDTOKEN_GEN_ALL_CORE_FT
aspirin 81 mg oral tablet: 1 tab(s) orally once a day  atorvastatin 10 mg oral tablet: 1 tab(s) orally once a day  Co Q-10 100 mg oral capsule: 1 cap(s) orally once a day  enalapril 10 mg oral tablet: 1 tab(s) orally once a day  Farxiga 5 mg oral tablet: 1 tab(s) orally once a day  fluocinolone 0.01% otic solution: 5 drop(s) to each affected ear 2 times a day  isosorbide mononitrate 30 mg oral tablet, extended release: 1 tab(s) orally once a day (in the morning)  levothyroxine 25 mcg (0.025 mg) oral tablet: 1 tab(s) orally once a day  metFORMIN 500 mg oral tablet, extended release: 1 tab(s) orally once a day  Metoprolol Succinate  mg oral tablet, extended release: 1 tab(s) orally once a day  NIFEdipine 60 mg oral tablet, extended release: 1 tab(s) orally once a day  Omega-3 1000 mg oral capsule: 1 cap(s) orally once a day  Vitamin B12 1000 mcg oral tablet: 1 tab(s) orally once a day aspirin 81 mg oral tablet: 1 tab(s) orally once a day  clopidogrel 75 mg oral tablet: 1 tab(s) orally once a day  Co Q-10 100 mg oral capsule: 1 cap(s) orally once a day  enalapril 10 mg oral tablet: 1 tab(s) orally once a day  Farxiga 5 mg oral tablet: 1 tab(s) orally once a day  fluocinolone 0.01% otic solution: 5 drop(s) to each affected ear 2 times a day  isosorbide mononitrate 30 mg oral tablet, extended release: 1 tab(s) orally once a day (in the morning)  levothyroxine 25 mcg (0.025 mg) oral tablet: 1 tab(s) orally once a day  metFORMIN 500 mg oral tablet, extended release: 1 tab(s) orally once a day (restart on 2/13/20 AM)  Metoprolol Succinate  mg oral tablet, extended release: 1 tab(s) orally once a day  NIFEdipine 60 mg oral tablet, extended release: 1 tab(s) orally once a day  Omega-3 1000 mg oral capsule: 1 cap(s) orally once a day  Vitamin B12 1000 mcg oral tablet: 1 tab(s) orally once a day aspirin 81 mg oral tablet: 1 tab(s) orally once a day  atorvastatin 40 mg oral tablet: 1 tab(s) orally once a day   clopidogrel 75 mg oral tablet: 1 tab(s) orally once a day  Co Q-10 100 mg oral capsule: 1 cap(s) orally once a day  enalapril 10 mg oral tablet: 1 tab(s) orally once a day  Farxiga 5 mg oral tablet: 1 tab(s) orally once a day  fluocinolone 0.01% otic solution: 5 drop(s) to each affected ear 2 times a day  isosorbide mononitrate 30 mg oral tablet, extended release: 1 tab(s) orally once a day (in the morning)  levothyroxine 25 mcg (0.025 mg) oral tablet: 1 tab(s) orally once a day  metFORMIN 500 mg oral tablet, extended release: 1 tab(s) orally once a day (restart on 2/13/20 AM)  Metoprolol Succinate  mg oral tablet, extended release: 1 tab(s) orally once a day  NIFEdipine 60 mg oral tablet, extended release: 1 tab(s) orally once a day  Omega-3 1000 mg oral capsule: 1 cap(s) orally once a day  Vitamin B12 1000 mcg oral tablet: 1 tab(s) orally once a day

## 2020-02-10 NOTE — DISCHARGE NOTE PROVIDER - NSDCCPCAREPLAN_GEN_ALL_CORE_FT
PRINCIPAL DISCHARGE DIAGNOSIS  Diagnosis: Coronary artery disease  Assessment and Plan of Treatment:       SECONDARY DISCHARGE DIAGNOSES  Diagnosis: Diabetes  Assessment and Plan of Treatment:     Diagnosis: Hyperlipidemia  Assessment and Plan of Treatment:     Diagnosis: Hypertension  Assessment and Plan of Treatment: PRINCIPAL DISCHARGE DIAGNOSIS  Diagnosis: Coronary artery disease  Assessment and Plan of Treatment:       SECONDARY DISCHARGE DIAGNOSES  Diagnosis: Diabetes  Assessment and Plan of Treatment: -Continue your Farixga 5 mg daily.   •	-DO NOT TAKE YOUR METFORMIN FOR TWO DAYS: YOU MAY RESTART 2/13/20. This medication can interact with the contrast used during your procedure therefore we want to ensure the contrast has left your body prior to you restarting your Metformin.   o	Make sure you monitor your finger sticks and diet while you are not taking your Metformin!      Diagnosis: Hyperlipidemia  Assessment and Plan of Treatment:     Diagnosis: Hypertension  Assessment and Plan of Treatment: Continue your Toprol  mg daily, Nifedipine 60 mg daily, Enalapril 10 mg daily and Imdur 30 mg daily. PRINCIPAL DISCHARGE DIAGNOSIS  Diagnosis: Coronary artery disease  Assessment and Plan of Treatment: You underwent a cardiac angiogram and received a stent to one of your bypass grafts. PLEASE CONTINUE ASPIRIN 81MG DAILY AND PLAVIX 75MG DAILY. DO NOT STOP THESE MEDICATIONS FOR ANY REASON AS THEY ARE KEEPING YOUR STENT OPEN AND PREVENTING A HEART ATTACK. Avoid strenuous activity or heavy lifting for the next five days. Do not take a bath or swim for the next five days; you may shower. For any bleeding or hematoma formation (hardened blood collection under the skin) at the access site of L wrist please hold pressure and go to the emergency room. Please follow up with Dr. Pritesh Moss in 1-2 weeks. For recurrent chest pain, please call your doctor or go to the emergency room.      SECONDARY DISCHARGE DIAGNOSES  Diagnosis: Diabetes  Assessment and Plan of Treatment: -Continue your Farixga 5 mg daily.   •	-DO NOT TAKE YOUR METFORMIN FOR TWO DAYS: YOU MAY RESTART 2/13/20 AM. This medication can interact with the contrast used during your procedure therefore we want to ensure the contrast has left your body prior to you restarting your Metformin.   o	Make sure you monitor your finger sticks and diet while you are not taking your Metformin!      Diagnosis: Hyperlipidemia  Assessment and Plan of Treatment: Please continue Atorvastatin 80 mg once daily to keep your cholesterol low. High cholesterol contributes to heart disease.    Diagnosis: Hypertension  Assessment and Plan of Treatment: Continue your Toprol  mg daily, Nifedipine 60 mg daily, Enalapril 10 mg daily and Imdur 30 mg daily. For blood pressure that is too high or too low please see your doctor or go to the emergency room as necessary. PRINCIPAL DISCHARGE DIAGNOSIS  Diagnosis: Coronary artery disease  Assessment and Plan of Treatment: You underwent a cardiac angiogram and received a stent to one of your bypass grafts. PLEASE CONTINUE ASPIRIN 81MG DAILY AND PLAVIX 75MG DAILY. DO NOT STOP THESE MEDICATIONS FOR ANY REASON AS THEY ARE KEEPING YOUR STENT OPEN AND PREVENTING A HEART ATTACK. Avoid strenuous activity or heavy lifting for the next five days. Do not take a bath or swim for the next five days; you may shower. For any bleeding or hematoma formation (hardened blood collection under the skin) at the access site of L wrist please hold pressure and go to the emergency room. Please follow up with Dr. Pritesh Moss in 1-2 weeks. For recurrent chest pain, please call your doctor or go to the emergency room.      SECONDARY DISCHARGE DIAGNOSES  Diagnosis: Diabetes  Assessment and Plan of Treatment: -Continue your Farixga 5 mg daily.   •	-DO NOT TAKE YOUR METFORMIN FOR TWO DAYS: YOU MAY RESTART 2/13/20 AM. This medication can interact with the contrast used during your procedure therefore we want to ensure the contrast has left your body prior to you restarting your Metformin.   o	Make sure you monitor your finger sticks and diet while you are not taking your Metformin!      Diagnosis: Hyperlipidemia  Assessment and Plan of Treatment: Please continue Atorvastatin 40 mg once daily to keep your cholesterol low. High cholesterol contributes to heart disease.    Diagnosis: Hypertension  Assessment and Plan of Treatment: Continue your Toprol  mg daily, Nifedipine 60 mg daily, Enalapril 10 mg daily and Imdur 30 mg daily. For blood pressure that is too high or too low please see your doctor or go to the emergency room as necessary.

## 2020-02-10 NOTE — DISCHARGE NOTE PROVIDER - NSDCFUADDINST_GEN_ALL_CORE_FT
•	Your procedure was done through your wrist  •	You do not need to keep this area covered and you may shower  •	Please avoid any heavy lifting  (no more than 3 to 5 lbs) or strenuous activity for five days  •	If you develop any swelling, bleeding, hardening of the skin (hematoma formation), acute pain, numbness/tingling  in your arm please contact your doctor immediately or call our 24/7 line: [phone]

## 2020-02-10 NOTE — PROGRESS NOTE ADULT - SUBJECTIVE AND OBJECTIVE BOX
Interventional Cardiology PA Adult Progress Note    CC: "I had chest pain overnight but I am feeling much better"  Subjective Assessment: Patient seen and examined at bedside this morning. Patient with no current complaints, reports she is currently chest pain free. Patient did have chest discomfort overnight relieved with NTG x one dose. She denies SOB/BROWN, PND, orthopnea, fever, chills, abdominal pain, leg edema, dizziness, fever, chills.     12 Point review of systems otherwise negative except per subjective   	  MEDICATIONS:  isosorbide   mononitrate ER Tablet (IMDUR) 30 milliGRAM(s) Oral daily  metoprolol tartrate 50 milliGRAM(s) Oral two times a day  NIFEdipine XL 60 milliGRAM(s) Oral daily  atorvastatin 80 milliGRAM(s) Oral at bedtime  dextrose 40% Gel 15 Gram(s) Oral once PRN  dextrose 50% Injectable 12.5 Gram(s) IV Push once  dextrose 50% Injectable 25 Gram(s) IV Push once  dextrose 50% Injectable 25 Gram(s) IV Push once  glucagon  Injectable 1 milliGRAM(s) IntraMuscular once PRN  insulin lispro (HumaLOG) corrective regimen sliding scale   SubCutaneous Before meals and at bedtime  levothyroxine 25 MICROGram(s) Oral <User Schedule>    aspirin enteric coated 81 milliGRAM(s) Oral every 24 hours  clopidogrel Tablet 75 milliGRAM(s) Oral daily  cyanocobalamin 1000 MICROGram(s) Oral daily  dextrose 5%. 1000 milliLiter(s) IV Continuous <Continuous>  enoxaparin Injectable 40 milliGRAM(s) SubCutaneous every 24 hours  sodium chloride 0.9%. 500 milliLiter(s) IV Continuous <Continuous>      	    [PHYSICAL EXAM:  TELEMETRY: No events overnight   T(C): 36.1 (02-10-20 @ 08:30), Max: 36.6 (02-09-20 @ 18:43)  HR: 54 (02-10-20 @ 10:27) (47 - 61)  BP: 184/76 (02-10-20 @ 10:27) (147/91 - 189/83)  RR: 18 (02-10-20 @ 10:27) (16 - 18)  SpO2: 100% (02-10-20 @ 08:10) (96% - 100%)  Wt(kg): --  I&O's Summary    10 Feb 2020 07:01  -  10 Feb 2020 10:55  --------------------------------------------------------  IN: 340 mL / OUT: 0 mL / NET: 340 mL      Height (cm): 157.5 (02-09 @ 22:02)  Weight (kg): 101.3 (02-09 @ 22:02)  BMI (kg/m2): 40.8 (02-09 @ 22:02)  BSA (m2): 2 (02-09 @ 22:02)    Gen: NAD, resting comfortable in bed  Neck: No JVD B/l  Cardiac: +S1, S2, RRR, healed sternotomy scar  Pulm: CTA b/l  Abdomen: Obese, NT, ND, BS present  Extremities: No edema b/l, warm to touch  LABS:	 	                                   14.2   5.28  )-----------( 197      ( 10 Feb 2020 06:37 )             45.0     02-10    144  |  105  |  14  ----------------------------<  152<H>  4.6   |  27  |  0.90    Ca    8.8      10 Feb 2020 06:37  Mg     2.2     02-10    TPro  7.4  /  Alb  4.2  /  TBili  0.2  /  DBili  x   /  AST  28  /  ALT  26  /  AlkPhos  90  02-09    proBNP: Serum Pro-Brain Natriuretic Peptide: 401 pg/mL (02-09 @ 16:34)    Lipid Profile:   HgA1c: Hemoglobin A1C, Whole Blood: 7.0 % (02-09 @ 22:41)    TSH: Thyroid Stimulating Hormone, Serum: 4.467 uIU/mL (02-09 @ 22:41)    PT/INR - ( 10 Feb 2020 06:37 )   PT: 9.7 sec;   INR: 0.86          PTT - ( 10 Feb 2020 06:37 )  PTT:32.0 sec

## 2020-02-10 NOTE — PROGRESS NOTE ADULT - PROBLEM SELECTOR PLAN 3
-No EF documented in the last several years (EF 55% by cardiac catheterization 2013)  -ICD interrogated during office visit 2/7/2020 with cardiologist Dr. Pritesh Moss: per daughter normal function.

## 2020-02-11 ENCOUNTER — TRANSCRIPTION ENCOUNTER (OUTPATIENT)
Age: 70
End: 2020-02-11

## 2020-02-11 VITALS — SYSTOLIC BLOOD PRESSURE: 110 MMHG | DIASTOLIC BLOOD PRESSURE: 57 MMHG | RESPIRATION RATE: 189 BRPM | HEART RATE: 62 BPM

## 2020-02-11 LAB
ANION GAP SERPL CALC-SCNC: 11 MMOL/L — SIGNIFICANT CHANGE UP (ref 5–17)
BUN SERPL-MCNC: 15 MG/DL — SIGNIFICANT CHANGE UP (ref 7–23)
CALCIUM SERPL-MCNC: 8.7 MG/DL — SIGNIFICANT CHANGE UP (ref 8.4–10.5)
CHLORIDE SERPL-SCNC: 104 MMOL/L — SIGNIFICANT CHANGE UP (ref 96–108)
CO2 SERPL-SCNC: 22 MMOL/L — SIGNIFICANT CHANGE UP (ref 22–31)
CREAT SERPL-MCNC: 0.68 MG/DL — SIGNIFICANT CHANGE UP (ref 0.5–1.3)
GLUCOSE BLDC GLUCOMTR-MCNC: 121 MG/DL — HIGH (ref 70–99)
GLUCOSE BLDC GLUCOMTR-MCNC: 149 MG/DL — HIGH (ref 70–99)
GLUCOSE SERPL-MCNC: 166 MG/DL — HIGH (ref 70–99)
HCT VFR BLD CALC: 42 % — SIGNIFICANT CHANGE UP (ref 34.5–45)
HGB BLD-MCNC: 13.7 G/DL — SIGNIFICANT CHANGE UP (ref 11.5–15.5)
MAGNESIUM SERPL-MCNC: 2 MG/DL — SIGNIFICANT CHANGE UP (ref 1.6–2.6)
MCHC RBC-ENTMCNC: 28.9 PG — SIGNIFICANT CHANGE UP (ref 27–34)
MCHC RBC-ENTMCNC: 32.6 GM/DL — SIGNIFICANT CHANGE UP (ref 32–36)
MCV RBC AUTO: 88.6 FL — SIGNIFICANT CHANGE UP (ref 80–100)
NRBC # BLD: 0 /100 WBCS — SIGNIFICANT CHANGE UP (ref 0–0)
PLATELET # BLD AUTO: 172 K/UL — SIGNIFICANT CHANGE UP (ref 150–400)
POTASSIUM SERPL-MCNC: 3.9 MMOL/L — SIGNIFICANT CHANGE UP (ref 3.5–5.3)
POTASSIUM SERPL-SCNC: 3.9 MMOL/L — SIGNIFICANT CHANGE UP (ref 3.5–5.3)
RBC # BLD: 4.74 M/UL — SIGNIFICANT CHANGE UP (ref 3.8–5.2)
RBC # FLD: 13.9 % — SIGNIFICANT CHANGE UP (ref 10.3–14.5)
SODIUM SERPL-SCNC: 137 MMOL/L — SIGNIFICANT CHANGE UP (ref 135–145)
WBC # BLD: 5.85 K/UL — SIGNIFICANT CHANGE UP (ref 3.8–10.5)
WBC # FLD AUTO: 5.85 K/UL — SIGNIFICANT CHANGE UP (ref 3.8–10.5)

## 2020-02-11 PROCEDURE — 99238 HOSP IP/OBS DSCHRG MGMT 30/<: CPT

## 2020-02-11 PROCEDURE — 93306 TTE W/DOPPLER COMPLETE: CPT | Mod: 26

## 2020-02-11 PROCEDURE — 93010 ELECTROCARDIOGRAM REPORT: CPT

## 2020-02-11 RX ORDER — ATORVASTATIN CALCIUM 80 MG/1
1 TABLET, FILM COATED ORAL
Qty: 90 | Refills: 0
Start: 2020-02-11 | End: 2020-05-10

## 2020-02-11 RX ORDER — ATORVASTATIN CALCIUM 80 MG/1
1 TABLET, FILM COATED ORAL
Qty: 0 | Refills: 0 | DISCHARGE

## 2020-02-11 RX ORDER — ASPIRIN/CALCIUM CARB/MAGNESIUM 324 MG
1 TABLET ORAL
Qty: 90 | Refills: 4
Start: 2020-02-11 | End: 2021-05-05

## 2020-02-11 RX ORDER — CLOPIDOGREL BISULFATE 75 MG/1
1 TABLET, FILM COATED ORAL
Qty: 90 | Refills: 4
Start: 2020-02-11 | End: 2021-05-05

## 2020-02-11 RX ORDER — ASPIRIN/CALCIUM CARB/MAGNESIUM 324 MG
1 TABLET ORAL
Qty: 0 | Refills: 0 | DISCHARGE

## 2020-02-11 RX ORDER — POTASSIUM CHLORIDE 20 MEQ
20 PACKET (EA) ORAL ONCE
Refills: 0 | Status: COMPLETED | OUTPATIENT
Start: 2020-02-11 | End: 2020-02-11

## 2020-02-11 RX ORDER — METFORMIN HYDROCHLORIDE 850 MG/1
1 TABLET ORAL
Qty: 0 | Refills: 0 | DISCHARGE

## 2020-02-11 RX ADMIN — Medication 60 MILLIGRAM(S): at 05:55

## 2020-02-11 RX ADMIN — Medication 1 GRAM(S): at 11:28

## 2020-02-11 RX ADMIN — ISOSORBIDE MONONITRATE 30 MILLIGRAM(S): 60 TABLET, EXTENDED RELEASE ORAL at 05:55

## 2020-02-11 RX ADMIN — CLOPIDOGREL BISULFATE 75 MILLIGRAM(S): 75 TABLET, FILM COATED ORAL at 11:28

## 2020-02-11 RX ADMIN — Medication 20 MILLIEQUIVALENT(S): at 07:48

## 2020-02-11 RX ADMIN — Medication 25 MICROGRAM(S): at 05:55

## 2020-02-11 RX ADMIN — Medication 81 MILLIGRAM(S): at 11:28

## 2020-02-11 RX ADMIN — PREGABALIN 1000 MICROGRAM(S): 225 CAPSULE ORAL at 11:28

## 2020-02-11 RX ADMIN — Medication 50 MILLIGRAM(S): at 05:55

## 2020-02-11 NOTE — DISCHARGE NOTE NURSING/CASE MANAGEMENT/SOCIAL WORK - PATIENT PORTAL LINK FT
You can access the FollowMyHealth Patient Portal offered by [facility] by registering at the following website: [email or web address]. By joining [employer]’s FollowMyHealth portal, you will also be able to view your health information using other applications (apps) compatible with our system.

## 2020-02-12 NOTE — CHART NOTE - NSCHARTNOTEFT_GEN_A_CORE
On Date 2/11/20  Discharge: Patient personally seen and examined myself,  My discharge exam:  General: WN/WD NAD  Neurology: A&Ox3, nonfocal, WEINER x 4  Head:  Normocephalic, atraumatic  ENT:  Mucosa moist, no ulcerations  Neck:  Supple, no sinuses or palpable masses  Lymphatic:  No palpable cervical, supraclavicular, axillary or inguinal adenopathy  Respiratory: CTA B/L  CV: RRR, S1S2, no murmur  Abdominal: Soft, NT, ND no palpable mass  MSK: No edema, + peripheral pulses, FROM all 4 extremity  Incisions: intact, no erythema or drainage      My Discussion of Hospital Stay:  This document is complete and the patient is ready for discharge. (10 Feb 2020 15:27)      Discharge Note Provider (complete) [HA Nicholson, HA Villafana] (10 Feb 2020 15:27)    Hospital Course:  Discharge Date  11-Feb-2020  Admission Date  09-Feb-2020 18:18  Reason for Admission  chest pain  Hospital Course    69 yo Simone and English speaking female with a PMHx of HTN, hyperlipidemia, DMII, hypothyroidism, known CAD with prior 3VCABG 2013 (LIMA-LAD, SVG-OM1, SVG-RCA (all patent by angiogram 2013), multiple PCIs most recent intervention s/p MATTHEW proximal ramus 2/2016 @ [facility], chronic systolic CHF (no recent EF documented), history of VT s/p ICD (with most recent interrogation Friday 2/7/2020) presented to [facility] ED (2/9/20) with unstable angina with admission to [facility] for further work up. Patient’s troponin negative x2. Patient is now s/p cardiac catheterization (2/10/2020) revealing dLMCA 70%, pLAD 100%, pRamus 100%, pLCx 100%, mRCA 100%, LIMA-LAD patent, SVG-RPDA patent, SVG-OM1 99% s/p MATTHEW, L radial.  Echo (2/11/2020) revealed normal LV size and systolic function, LVEF 71%, no regional WMA, abnormal septal motion seen due to previous cardiac surgery, mildly dilated RV, normal RVSF, moderate-severe TR, pulmonary HTN (42.9 mmHg), no pericardial effusion, R heart device leads. Patient seen and examined at bedside this morning. Intermittent PVCs & SB in 30s-40s on telemetry overnight while sleeping.  Labs significant for hypokalemia to 3.9 which was repleted with KCl 20 mEq x1. Patient with no complaints and is out of bed ambulating with no issues. Left radial access with dime-size hematoma this morning with ooze that resolved with 5 minutes of manual compression. 2+ radial pulse. Lab values, telemetry and vital signs reviewed and remained stable overnight. Discharge medication regimen reviewed with Dr. Martinez with plan for patient to take Aspirin 81 mg daily, Plavix 75 mg daily, Imdur 30 mg daily, Toprol  mg daily,  Procardia XL 60 mg daily, Enalapril 10 mg daily.  No need to change Toprol  mg PO QD even though noted SB 30s-40s overnight as d/w Dr. Martinez. LDL 74 on Atorvastatin 10 mg PO QD as outpatient. Will discharge on Atorvastatin 40 mg PO QD as d/w Dr. Martinez No need for CTS consult for moderate-severe TR at this time as per Dr. Martinez. Patient has been cleared for discharge at this time by Dr. Martinez and will follow up with Dr. Moss within 1-2 weeks for a post PCI check up.  All discharge instructions have been reviewed with patient and medications have been e-prescribed to patient’s pharmacy.     Temp 98.3F, HR 62 BPM,  /63, RR 18, SpO2 95% on RA  Gen: NAD, A&O x3  Cards: RRR, clear S1 and S2 without murmur  Pulm: CTA B/L without w/r/r  L radial:  dime-size hematoma with ooze that resolved with 5 minutes manual pressure, scattered ecchymosis, 2+ radial pulse  Abd: soft, NT, BS + x4  Ext: no LE edema or ulcerations B/L      Med Reconciliation:  Medication Reconciliation Status  Admission Reconciliation is Completed  Discharge Reconciliation is Completed  Discharge Medications  aspirin 81 mg oral tablet: 1 tab(s) orally once a day  atorvastatin 40 mg oral tablet: 1 tab(s) orally once a day   clopidogrel 75 mg oral tablet: 1 tab(s) orally once a day  Co Q-10 100 mg oral capsule: 1 cap(s) orally once a day  enalapril 10 mg oral tablet: 1 tab(s) orally once a day  Farxiga 5 mg oral tablet: 1 tab(s) orally once a day  fluocinolone 0.01% otic solution: 5 drop(s) to each affected ear 2 times a day  isosorbide mononitrate 30 mg oral tablet, extended release: 1 tab(s) orally once a day (in the morning)  levothyroxine 25 mcg (0.025 mg) oral tablet: 1 tab(s) orally once a day  metFORMIN 500 mg oral tablet, extended release: 1 tab(s) orally once a day (restart on 2/13/20 AM)  Metoprolol Succinate  mg oral tablet, extended release: 1 tab(s) orally once a day  NIFEdipine 60 mg oral tablet, extended release: 1 tab(s) orally once a day  Omega-3 1000 mg oral capsule: 1 cap(s) orally once a day  Vitamin B12 1000 mcg oral tablet: 1 tab(s) orally once a day    Care Plan/Procedures:  Goal(s)  To get better and follow your care plan as instructed.  Discharge Diagnoses, Assessment and Plan of Treatment  PRINCIPAL DISCHARGE DIAGNOSIS  Diagnosis: Coronary artery disease  Assessment and Plan of Treatment: You underwent a cardiac angiogram and received a stent to one of your bypass grafts. PLEASE CONTINUE ASPIRIN 81MG DAILY AND PLAVIX 75MG DAILY. DO NOT STOP THESE MEDICATIONS FOR ANY REASON AS THEY ARE KEEPING YOUR STENT OPEN AND PREVENTING A HEART ATTACK. Avoid strenuous activity or heavy lifting for the next five days. Do not take a bath or swim for the next five days; you may shower. For any bleeding or hematoma formation (hardened blood collection under the skin) at the access site of L wrist please hold pressure and go to the emergency room. Please follow up with Dr. Pritesh Moss in 1-2 weeks. For recurrent chest pain, please call your doctor or go to the emergency room.      SECONDARY DISCHARGE DIAGNOSES  Diagnosis: Diabetes  Assessment and Plan of Treatment: -Continue your Farixga 5 mg daily.   •	-DO NOT TAKE YOUR METFORMIN FOR TWO DAYS: YOU MAY RESTART 2/13/20 AM. This medication can interact with the contrast used during your procedure therefore we want to ensure the contrast has left your body prior to you restarting your Metformin.   o	Make sure you monitor your finger sticks and diet while you are not taking your Metformin!      Diagnosis: Hyperlipidemia  Assessment and Plan of Treatment: Please continue Atorvastatin 40 mg once daily to keep your cholesterol low. High cholesterol contributes to heart disease.    Diagnosis: Hypertension  Assessment and Plan of Treatment: Continue your Toprol  mg daily, Nifedipine 60 mg daily, Enalapril 10 mg daily and Imdur 30 mg daily. For blood pressure that is too high or too low please see your doctor or go to the emergency room as necessary.    Follow Up:  Care Providers for Follow up (PCP/Outpatient Provider)  Pritesh Moss  [address] [address] [phone]  Phone: [phone]  Fax: (   )    -  Follow Up Time: 1 week  Patient's Scheduled Appointments  JOANIE PAYAN ; 02/20/2020 ; [facility] [address]  Diet Instructions  A Mediterranean Diet is recommended! Some suggestions include continue incorporating 2 or more servings per day of vegetables, fruits, and whole grains. Increase intake of fish and legumes/beans to 2 or more servings per week. Aim to increase intake of healthy fats, such as olive oil and avocados, and have a handful of nuts/seeds most days. Reduce red/processed meat consumption to 2 or fewer times per week.  Activity  No heavy lifting/straining, Showering allowed, Stairs allowed, Walking - Indoors allowed, Walking - Outdoors allowed  Additional Instructions  •	Your procedure was done through your wrist  •	You do not need to keep this area covered and you may shower  •	Please avoid any heavy lifting  (no more than 3 to 5 lbs) or strenuous activity for five days  •	If you develop any swelling, bleeding, hardening of the skin (hematoma formation), acute pain, numbness/tingling  in your arm please contact your doctor immediately or call our 24/7 line: [phone]    Quality Measures:  Does the patient have difficulty running errands alone like visiting a doctor’s office or shopping?  No  Does the patient have difficulty climbing stairs?  No  Patient Condition  Stable  Hospice Patient  No  Cognition: The patient has  No difficulties  Did the patient present with or suffer from an ischemic stroke, hemorrhagic stroke or TIA during this admission?  No  Has the patient had an Acute Myocardial Infarction?  No  Has the patient had a Percutaneous Coronary Intervention?  No    Document Complete:  [facility]  Physician Section Complete  This document is complete and the patient is ready for discharge.  For questions about your prescriptions, please call:  [phone]  Is this contact telephone number correct?  Yes      Electronic Signatures for Addendum Section:   Alena Villafana) (Signed Addendum 11-Feb-2020 14:00)    Dr. Manzanares came to bedside as pt with L subclavian stenosis. BP equal on L and R arms. Dr. Manzanares to arrange for patient to see him in office.  Maude Martinez) (Signed Addendum 12-Feb-2020 09:37)    On Date 2/11/20  Discharge: Patient personally seen and examined myself,   My discharge exam:  General: WN/WD NAD  Neurology: A&Ox3, nonfocal, WEINER x 4  Head:  Normocephalic, atraumatic  ENT:  Mucosa moist, no ulcerations  Neck:  Supple, no sinuses or palpable masses  Lymphatic:  No palpable cervical, supraclavicular, axillary or inguinal adenopathy  Respiratory: CTA B/L  CV: RRR, S1S2, no murmur  Abdominal: Soft, NT, ND no palpable mass  MSK: No edema, + peripheral pulses, FROM all 4 extremity  Incisions: intact, no erythema or drainage

## 2020-02-14 PROBLEM — E03.9 HYPOTHYROIDISM, UNSPECIFIED: Chronic | Status: ACTIVE | Noted: 2020-02-09

## 2020-02-14 PROBLEM — Z86.79 PERSONAL HISTORY OF OTHER DISEASES OF THE CIRCULATORY SYSTEM: Chronic | Status: ACTIVE | Noted: 2020-02-09

## 2020-02-14 PROBLEM — I25.10 ATHEROSCLEROTIC HEART DISEASE OF NATIVE CORONARY ARTERY WITHOUT ANGINA PECTORIS: Chronic | Status: ACTIVE | Noted: 2020-02-09

## 2020-02-14 PROBLEM — E11.9 TYPE 2 DIABETES MELLITUS WITHOUT COMPLICATIONS: Chronic | Status: ACTIVE | Noted: 2020-02-09

## 2020-02-18 DIAGNOSIS — E03.9 HYPOTHYROIDISM, UNSPECIFIED: ICD-10-CM

## 2020-02-18 DIAGNOSIS — E78.5 HYPERLIPIDEMIA, UNSPECIFIED: ICD-10-CM

## 2020-02-18 DIAGNOSIS — I25.10 ATHEROSCLEROTIC HEART DISEASE OF NATIVE CORONARY ARTERY WITHOUT ANGINA PECTORIS: ICD-10-CM

## 2020-02-18 DIAGNOSIS — I49.3 VENTRICULAR PREMATURE DEPOLARIZATION: ICD-10-CM

## 2020-02-18 DIAGNOSIS — I27.20 PULMONARY HYPERTENSION, UNSPECIFIED: ICD-10-CM

## 2020-02-18 DIAGNOSIS — E66.9 OBESITY, UNSPECIFIED: ICD-10-CM

## 2020-02-18 DIAGNOSIS — I25.710 ATHEROSCLEROSIS OF AUTOLOGOUS VEIN CORONARY ARTERY BYPASS GRAFT(S) WITH UNSTABLE ANGINA PECTORIS: ICD-10-CM

## 2020-02-18 DIAGNOSIS — Z79.84 LONG TERM (CURRENT) USE OF ORAL HYPOGLYCEMIC DRUGS: ICD-10-CM

## 2020-02-18 DIAGNOSIS — Z88.0 ALLERGY STATUS TO PENICILLIN: ICD-10-CM

## 2020-02-18 DIAGNOSIS — I11.0 HYPERTENSIVE HEART DISEASE WITH HEART FAILURE: ICD-10-CM

## 2020-02-18 DIAGNOSIS — Z95.5 PRESENCE OF CORONARY ANGIOPLASTY IMPLANT AND GRAFT: ICD-10-CM

## 2020-02-18 DIAGNOSIS — I07.1 RHEUMATIC TRICUSPID INSUFFICIENCY: ICD-10-CM

## 2020-02-18 DIAGNOSIS — E11.9 TYPE 2 DIABETES MELLITUS WITHOUT COMPLICATIONS: ICD-10-CM

## 2020-02-18 DIAGNOSIS — I70.8 ATHEROSCLEROSIS OF OTHER ARTERIES: ICD-10-CM

## 2020-02-18 DIAGNOSIS — E87.6 HYPOKALEMIA: ICD-10-CM

## 2020-02-18 DIAGNOSIS — R00.1 BRADYCARDIA, UNSPECIFIED: ICD-10-CM

## 2020-02-18 DIAGNOSIS — I50.22 CHRONIC SYSTOLIC (CONGESTIVE) HEART FAILURE: ICD-10-CM

## 2020-02-18 DIAGNOSIS — Z95.810 PRESENCE OF AUTOMATIC (IMPLANTABLE) CARDIAC DEFIBRILLATOR: ICD-10-CM

## 2020-02-19 ENCOUNTER — APPOINTMENT (OUTPATIENT)
Dept: HEART AND VASCULAR | Facility: CLINIC | Age: 70
End: 2020-02-19
Payer: MEDICARE

## 2020-02-19 ENCOUNTER — NON-APPOINTMENT (OUTPATIENT)
Age: 70
End: 2020-02-19

## 2020-02-19 ENCOUNTER — APPOINTMENT (OUTPATIENT)
Dept: HEART AND VASCULAR | Facility: CLINIC | Age: 70
End: 2020-02-19
Payer: COMMERCIAL

## 2020-02-19 VITALS — SYSTOLIC BLOOD PRESSURE: 126 MMHG | DIASTOLIC BLOOD PRESSURE: 62 MMHG

## 2020-02-19 VITALS
OXYGEN SATURATION: 98 % | HEIGHT: 62 IN | WEIGHT: 218.99 LBS | SYSTOLIC BLOOD PRESSURE: 120 MMHG | HEART RATE: 63 BPM | DIASTOLIC BLOOD PRESSURE: 68 MMHG | TEMPERATURE: 97.8 F | BODY MASS INDEX: 40.3 KG/M2

## 2020-02-19 PROCEDURE — 93880 EXTRACRANIAL BILAT STUDY: CPT

## 2020-02-19 PROCEDURE — 99213 OFFICE O/P EST LOW 20 MIN: CPT

## 2020-02-19 NOTE — HISTORY OF PRESENT ILLNESS
[FreeTextEntry1] : 69 yo woman with HTN, HC, CAD s/p MI, CABG and multiple PCI's, ventricular tachycardia s/p AICD (though relatively normal EF), referred by Dr. Pritesh Moss for evaluation for possible left subclavian stenosis.  The pt was recently admitted to [facility] for UA with cardiac cath via left radial artery showing severe SVG-OM disease treated with MATTHEW.  During the procedure there appears to be a 30 mmHg upon pull back from aorta to distal left subclavian artery.  The pt was recommended to have vascular clinic follow up.  \par \par The pt denies h/o left arm pain or numbness upon left UE usage.  She denies dizziness or syncope.  Since discharge, she has noticed much improved exertional left chest tightness.

## 2020-02-19 NOTE — ASSESSMENT
[FreeTextEntry1] : 1. Suspected left subclavian stenosis: with no symptom, equal b/l UE SBP and no increased velocity on Duplex USG with antegrade vertebral flow, hemodynamically sig subclavian stenosis is ruled out.  Pull back gradient on cardiac cath is likely erroneous with dampening pressure at the catheter tip in very tortuous subclavian artery.\par 2. HTN: controlled.\par 3. HC: on statin.\par 4. CAD: improved angina s/p recent PCI.\par \par Plan:\par 1. Reassurance.\par 2. C/w aggressive secondary prevention for ASCVD.\par 3. F/u with Dr. Pritesh Moss.\par 4. RTC PRN.

## 2020-02-19 NOTE — PHYSICAL EXAM
[General Appearance - Well Developed] : well developed [General Appearance - Well Nourished] : well nourished [Normal Conjunctiva] : the conjunctiva exhibited no abnormalities [Normal Oral Mucosa] : normal oral mucosa [JVD Elevated _____cm] : JVD elevated [unfilled] ~U cm above clavicle [Heart Sounds] : normal S1 and S2 [Murmurs] : no murmurs present [Respiration, Rhythm And Depth] : normal respiratory rhythm and effort [Auscultation Breath Sounds / Voice Sounds] : lungs were clear to auscultation bilaterally [Abdomen Soft] : soft [Abdomen Tenderness] : non-tender [FreeTextEntry1] : No bruit [Abnormal Walk] : normal gait [Nail Clubbing] : no clubbing of the fingernails [Skin Color & Pigmentation] : normal skin color and pigmentation [Cyanosis, Localized] : no localized cyanosis [Oriented To Time, Place, And Person] : oriented to person, place, and time

## 2020-02-19 NOTE — REVIEW OF SYSTEMS
[Fever] : no fever [Chills] : no chills [Blurry Vision] : no blurred vision [Earache] : no earache [Shortness Of Breath] : no shortness of breath [Chest Pain] : no chest pain [Lower Ext Edema] : no extremity edema [Leg Claudication] : no intermittent leg claudication [Palpitations] : no palpitations [Cough] : no cough [Abdominal Pain] : no abdominal pain [Dysuria] : no dysuria [Joint Pain] : no joint pain [Skin: A Rash] : no rash: [Dizziness] : no dizziness [Confusion] : no confusion was observed [Excessive Thirst] : no polydipsia [Easy Bleeding] : no tendency for easy bleeding

## 2020-02-20 ENCOUNTER — APPOINTMENT (OUTPATIENT)
Dept: ELECTROPHYSIOLOGY | Facility: CLINIC | Age: 70
End: 2020-02-20
Payer: COMMERCIAL

## 2020-02-20 VITALS
HEIGHT: 62 IN | HEART RATE: 67 BPM | BODY MASS INDEX: 40.12 KG/M2 | WEIGHT: 218 LBS | OXYGEN SATURATION: 99 % | DIASTOLIC BLOOD PRESSURE: 82 MMHG | SYSTOLIC BLOOD PRESSURE: 159 MMHG

## 2020-02-20 PROCEDURE — 93282 PRGRMG EVAL IMPLANTABLE DFB: CPT

## 2020-03-26 PROCEDURE — 93306 TTE W/DOPPLER COMPLETE: CPT

## 2020-03-26 PROCEDURE — C1887: CPT

## 2020-03-26 PROCEDURE — 86803 HEPATITIS C AB TEST: CPT

## 2020-03-26 PROCEDURE — 82962 GLUCOSE BLOOD TEST: CPT

## 2020-03-26 PROCEDURE — C1725: CPT

## 2020-03-26 PROCEDURE — 83880 ASSAY OF NATRIURETIC PEPTIDE: CPT

## 2020-03-26 PROCEDURE — 83036 HEMOGLOBIN GLYCOSYLATED A1C: CPT

## 2020-03-26 PROCEDURE — 84484 ASSAY OF TROPONIN QUANT: CPT

## 2020-03-26 PROCEDURE — 71045 X-RAY EXAM CHEST 1 VIEW: CPT

## 2020-03-26 PROCEDURE — C1894: CPT

## 2020-03-26 PROCEDURE — 84443 ASSAY THYROID STIM HORMONE: CPT

## 2020-03-26 PROCEDURE — C1874: CPT

## 2020-03-26 PROCEDURE — 85610 PROTHROMBIN TIME: CPT

## 2020-03-26 PROCEDURE — 80048 BASIC METABOLIC PNL TOTAL CA: CPT

## 2020-03-26 PROCEDURE — 85025 COMPLETE CBC W/AUTO DIFF WBC: CPT

## 2020-03-26 PROCEDURE — 85027 COMPLETE CBC AUTOMATED: CPT

## 2020-03-26 PROCEDURE — 86901 BLOOD TYPING SEROLOGIC RH(D): CPT

## 2020-03-26 PROCEDURE — 93005 ELECTROCARDIOGRAM TRACING: CPT

## 2020-03-26 PROCEDURE — 99285 EMERGENCY DEPT VISIT HI MDM: CPT

## 2020-03-26 PROCEDURE — 36415 COLL VENOUS BLD VENIPUNCTURE: CPT

## 2020-03-26 PROCEDURE — 80053 COMPREHEN METABOLIC PANEL: CPT

## 2020-03-26 PROCEDURE — 83735 ASSAY OF MAGNESIUM: CPT

## 2020-03-26 PROCEDURE — C1884: CPT

## 2020-03-26 PROCEDURE — C1769: CPT

## 2020-03-26 PROCEDURE — 82550 ASSAY OF CK (CPK): CPT

## 2020-03-26 PROCEDURE — 86850 RBC ANTIBODY SCREEN: CPT

## 2020-03-26 PROCEDURE — 80061 LIPID PANEL: CPT

## 2020-03-26 PROCEDURE — 85730 THROMBOPLASTIN TIME PARTIAL: CPT

## 2020-03-26 PROCEDURE — 82553 CREATINE MB FRACTION: CPT

## 2020-05-27 NOTE — PATIENT PROFILE ADULT - ARE SIGNIFICANT INDICATORS COMPLETE.
[facility] calling requesting refill of fentaNYL (DURAGESIC) 12 MCG/HR patch, please mail to facility.    No Yes

## 2020-08-20 ENCOUNTER — APPOINTMENT (OUTPATIENT)
Dept: ELECTROPHYSIOLOGY | Facility: CLINIC | Age: 70
End: 2020-08-20
Payer: COMMERCIAL

## 2020-08-20 ENCOUNTER — NON-APPOINTMENT (OUTPATIENT)
Age: 70
End: 2020-08-20

## 2020-08-20 VITALS
SYSTOLIC BLOOD PRESSURE: 134 MMHG | WEIGHT: 222 LBS | OXYGEN SATURATION: 99 % | DIASTOLIC BLOOD PRESSURE: 83 MMHG | HEIGHT: 62 IN | HEART RATE: 60 BPM | BODY MASS INDEX: 40.85 KG/M2

## 2020-08-20 PROCEDURE — 93282 PRGRMG EVAL IMPLANTABLE DFB: CPT

## 2020-09-15 ENCOUNTER — INPATIENT (INPATIENT)
Facility: HOSPITAL | Age: 70
LOS: 0 days | Discharge: HOME | DRG: 287 | End: 2020-09-16
Attending: HOSPITALIST | Admitting: HOSPITALIST
Payer: MEDICARE

## 2020-09-15 VITALS
HEART RATE: 74 BPM | DIASTOLIC BLOOD PRESSURE: 87 MMHG | WEIGHT: 223.11 LBS | TEMPERATURE: 98 F | SYSTOLIC BLOOD PRESSURE: 186 MMHG | RESPIRATION RATE: 18 BRPM | HEIGHT: 62 IN | OXYGEN SATURATION: 99 %

## 2020-09-15 DIAGNOSIS — Z95.5 PRESENCE OF CORONARY ANGIOPLASTY IMPLANT AND GRAFT: Chronic | ICD-10-CM

## 2020-09-15 LAB
ALBUMIN SERPL ELPH-MCNC: 4.4 G/DL — SIGNIFICANT CHANGE UP (ref 3.3–5)
ALP SERPL-CCNC: 97 U/L — SIGNIFICANT CHANGE UP (ref 40–120)
ALT FLD-CCNC: SIGNIFICANT CHANGE UP U/L (ref 10–45)
ANION GAP SERPL CALC-SCNC: 12 MMOL/L — SIGNIFICANT CHANGE UP (ref 5–17)
APTT BLD: 29 SEC — SIGNIFICANT CHANGE UP (ref 27.5–35.5)
AST SERPL-CCNC: SIGNIFICANT CHANGE UP U/L (ref 10–40)
BASOPHILS # BLD AUTO: 0.04 K/UL — SIGNIFICANT CHANGE UP (ref 0–0.2)
BASOPHILS NFR BLD AUTO: 0.5 % — SIGNIFICANT CHANGE UP (ref 0–2)
BILIRUB SERPL-MCNC: 0.4 MG/DL — SIGNIFICANT CHANGE UP (ref 0.2–1.2)
BUN SERPL-MCNC: 12 MG/DL — SIGNIFICANT CHANGE UP (ref 7–23)
CALCIUM SERPL-MCNC: 9.6 MG/DL — SIGNIFICANT CHANGE UP (ref 8.4–10.5)
CHLORIDE SERPL-SCNC: 102 MMOL/L — SIGNIFICANT CHANGE UP (ref 96–108)
CO2 SERPL-SCNC: 28 MMOL/L — SIGNIFICANT CHANGE UP (ref 22–31)
CREAT SERPL-MCNC: 0.75 MG/DL — SIGNIFICANT CHANGE UP (ref 0.5–1.3)
EOSINOPHIL # BLD AUTO: 0.15 K/UL — SIGNIFICANT CHANGE UP (ref 0–0.5)
EOSINOPHIL NFR BLD AUTO: 2 % — SIGNIFICANT CHANGE UP (ref 0–6)
GLUCOSE BLDC GLUCOMTR-MCNC: 195 MG/DL — HIGH (ref 70–99)
GLUCOSE BLDC GLUCOMTR-MCNC: 91 MG/DL — SIGNIFICANT CHANGE UP (ref 70–99)
GLUCOSE BLDC GLUCOMTR-MCNC: 97 MG/DL — SIGNIFICANT CHANGE UP (ref 70–99)
GLUCOSE SERPL-MCNC: 123 MG/DL — HIGH (ref 70–99)
HCT VFR BLD CALC: 44.5 % — SIGNIFICANT CHANGE UP (ref 34.5–45)
HGB BLD-MCNC: 14.1 G/DL — SIGNIFICANT CHANGE UP (ref 11.5–15.5)
IMM GRANULOCYTES NFR BLD AUTO: 0.3 % — SIGNIFICANT CHANGE UP (ref 0–1.5)
INR BLD: 0.89 — SIGNIFICANT CHANGE UP (ref 0.88–1.16)
LYMPHOCYTES # BLD AUTO: 1.91 K/UL — SIGNIFICANT CHANGE UP (ref 1–3.3)
LYMPHOCYTES # BLD AUTO: 25.7 % — SIGNIFICANT CHANGE UP (ref 13–44)
MAGNESIUM SERPL-MCNC: 2.2 MG/DL — SIGNIFICANT CHANGE UP (ref 1.6–2.6)
MCHC RBC-ENTMCNC: 28.8 PG — SIGNIFICANT CHANGE UP (ref 27–34)
MCHC RBC-ENTMCNC: 31.7 GM/DL — LOW (ref 32–36)
MCV RBC AUTO: 91 FL — SIGNIFICANT CHANGE UP (ref 80–100)
MONOCYTES # BLD AUTO: 0.76 K/UL — SIGNIFICANT CHANGE UP (ref 0–0.9)
MONOCYTES NFR BLD AUTO: 10.2 % — SIGNIFICANT CHANGE UP (ref 2–14)
NEUTROPHILS # BLD AUTO: 4.54 K/UL — SIGNIFICANT CHANGE UP (ref 1.8–7.4)
NEUTROPHILS NFR BLD AUTO: 61.3 % — SIGNIFICANT CHANGE UP (ref 43–77)
NRBC # BLD: 0 /100 WBCS — SIGNIFICANT CHANGE UP (ref 0–0)
PHOSPHATE SERPL-MCNC: 4.2 MG/DL — SIGNIFICANT CHANGE UP (ref 2.5–4.5)
PLATELET # BLD AUTO: 214 K/UL — SIGNIFICANT CHANGE UP (ref 150–400)
POTASSIUM SERPL-MCNC: 4.4 MMOL/L — SIGNIFICANT CHANGE UP (ref 3.5–5.3)
POTASSIUM SERPL-MCNC: SIGNIFICANT CHANGE UP MMOL/L (ref 3.5–5.3)
POTASSIUM SERPL-SCNC: 4.4 MMOL/L — SIGNIFICANT CHANGE UP (ref 3.5–5.3)
POTASSIUM SERPL-SCNC: SIGNIFICANT CHANGE UP MMOL/L (ref 3.5–5.3)
PROT SERPL-MCNC: 8 G/DL — SIGNIFICANT CHANGE UP (ref 6–8.3)
PROTHROM AB SERPL-ACNC: 10.7 SEC — SIGNIFICANT CHANGE UP (ref 10.6–13.6)
RBC # BLD: 4.89 M/UL — SIGNIFICANT CHANGE UP (ref 3.8–5.2)
RBC # FLD: 15.1 % — HIGH (ref 10.3–14.5)
SARS-COV-2 RNA SPEC QL NAA+PROBE: SIGNIFICANT CHANGE UP
SODIUM SERPL-SCNC: 142 MMOL/L — SIGNIFICANT CHANGE UP (ref 135–145)
TROPONIN T SERPL-MCNC: <0.01 NG/ML — SIGNIFICANT CHANGE UP (ref 0–0.01)
WBC # BLD: 7.42 K/UL — SIGNIFICANT CHANGE UP (ref 3.8–10.5)
WBC # FLD AUTO: 7.42 K/UL — SIGNIFICANT CHANGE UP (ref 3.8–10.5)

## 2020-09-15 PROCEDURE — 71045 X-RAY EXAM CHEST 1 VIEW: CPT | Mod: 26

## 2020-09-15 PROCEDURE — 99223 1ST HOSP IP/OBS HIGH 75: CPT

## 2020-09-15 PROCEDURE — 93010 ELECTROCARDIOGRAM REPORT: CPT

## 2020-09-15 PROCEDURE — 99285 EMERGENCY DEPT VISIT HI MDM: CPT | Mod: 25

## 2020-09-15 PROCEDURE — 93459 L HRT ART/GRFT ANGIO: CPT | Mod: 26

## 2020-09-15 RX ORDER — SODIUM CHLORIDE 9 MG/ML
1000 INJECTION, SOLUTION INTRAVENOUS
Refills: 0 | Status: DISCONTINUED | OUTPATIENT
Start: 2020-09-15 | End: 2020-09-16

## 2020-09-15 RX ORDER — ASPIRIN/CALCIUM CARB/MAGNESIUM 324 MG
81 TABLET ORAL DAILY
Refills: 0 | Status: DISCONTINUED | OUTPATIENT
Start: 2020-09-16 | End: 2020-09-16

## 2020-09-15 RX ORDER — GLUCAGON INJECTION, SOLUTION 0.5 MG/.1ML
1 INJECTION, SOLUTION SUBCUTANEOUS ONCE
Refills: 0 | Status: DISCONTINUED | OUTPATIENT
Start: 2020-09-15 | End: 2020-09-16

## 2020-09-15 RX ORDER — ATORVASTATIN CALCIUM 80 MG/1
40 TABLET, FILM COATED ORAL AT BEDTIME
Refills: 0 | Status: DISCONTINUED | OUTPATIENT
Start: 2020-09-15 | End: 2020-09-16

## 2020-09-15 RX ORDER — NIFEDIPINE 30 MG
30 TABLET, EXTENDED RELEASE 24 HR ORAL ONCE
Refills: 0 | Status: COMPLETED | OUTPATIENT
Start: 2020-09-15 | End: 2020-09-15

## 2020-09-15 RX ORDER — SODIUM CHLORIDE 9 MG/ML
500 INJECTION INTRAMUSCULAR; INTRAVENOUS; SUBCUTANEOUS
Refills: 0 | Status: DISCONTINUED | OUTPATIENT
Start: 2020-09-15 | End: 2020-09-15

## 2020-09-15 RX ORDER — DEXTROSE 50 % IN WATER 50 %
25 SYRINGE (ML) INTRAVENOUS ONCE
Refills: 0 | Status: DISCONTINUED | OUTPATIENT
Start: 2020-09-15 | End: 2020-09-16

## 2020-09-15 RX ORDER — INSULIN LISPRO 100/ML
VIAL (ML) SUBCUTANEOUS
Refills: 0 | Status: DISCONTINUED | OUTPATIENT
Start: 2020-09-15 | End: 2020-09-16

## 2020-09-15 RX ORDER — DEXTROSE 50 % IN WATER 50 %
12.5 SYRINGE (ML) INTRAVENOUS ONCE
Refills: 0 | Status: DISCONTINUED | OUTPATIENT
Start: 2020-09-15 | End: 2020-09-16

## 2020-09-15 RX ORDER — PREGABALIN 225 MG/1
1000 CAPSULE ORAL DAILY
Refills: 0 | Status: DISCONTINUED | OUTPATIENT
Start: 2020-09-16 | End: 2020-09-16

## 2020-09-15 RX ORDER — NIFEDIPINE 30 MG
60 TABLET, EXTENDED RELEASE 24 HR ORAL
Refills: 0 | Status: DISCONTINUED | OUTPATIENT
Start: 2020-09-16 | End: 2020-09-16

## 2020-09-15 RX ORDER — ISOSORBIDE MONONITRATE 60 MG/1
30 TABLET, EXTENDED RELEASE ORAL
Refills: 0 | Status: DISCONTINUED | OUTPATIENT
Start: 2020-09-15 | End: 2020-09-16

## 2020-09-15 RX ORDER — METOPROLOL TARTRATE 50 MG
100 TABLET ORAL DAILY
Refills: 0 | Status: DISCONTINUED | OUTPATIENT
Start: 2020-09-16 | End: 2020-09-16

## 2020-09-15 RX ORDER — CLOPIDOGREL BISULFATE 75 MG/1
75 TABLET, FILM COATED ORAL DAILY
Refills: 0 | Status: DISCONTINUED | OUTPATIENT
Start: 2020-09-16 | End: 2020-09-16

## 2020-09-15 RX ORDER — DEXTROSE 50 % IN WATER 50 %
15 SYRINGE (ML) INTRAVENOUS ONCE
Refills: 0 | Status: DISCONTINUED | OUTPATIENT
Start: 2020-09-15 | End: 2020-09-16

## 2020-09-15 RX ORDER — SODIUM CHLORIDE 9 MG/ML
500 INJECTION INTRAMUSCULAR; INTRAVENOUS; SUBCUTANEOUS
Refills: 0 | Status: DISCONTINUED | OUTPATIENT
Start: 2020-09-15 | End: 2020-09-16

## 2020-09-15 RX ORDER — LEVOTHYROXINE SODIUM 125 MCG
25 TABLET ORAL DAILY
Refills: 0 | Status: DISCONTINUED | OUTPATIENT
Start: 2020-09-16 | End: 2020-09-16

## 2020-09-15 RX ADMIN — ISOSORBIDE MONONITRATE 30 MILLIGRAM(S): 60 TABLET, EXTENDED RELEASE ORAL at 22:09

## 2020-09-15 RX ADMIN — Medication 30 MILLIGRAM(S): at 12:58

## 2020-09-15 RX ADMIN — SODIUM CHLORIDE 75 MILLILITER(S): 9 INJECTION INTRAMUSCULAR; INTRAVENOUS; SUBCUTANEOUS at 20:09

## 2020-09-15 RX ADMIN — SODIUM CHLORIDE 75 MILLILITER(S): 9 INJECTION INTRAMUSCULAR; INTRAVENOUS; SUBCUTANEOUS at 17:03

## 2020-09-15 RX ADMIN — Medication 2: at 22:30

## 2020-09-15 RX ADMIN — ATORVASTATIN CALCIUM 40 MILLIGRAM(S): 80 TABLET, FILM COATED ORAL at 22:10

## 2020-09-15 RX ADMIN — Medication 30 MILLIGRAM(S): at 11:53

## 2020-09-15 NOTE — H&P ADULT - ASSESSMENT
69 yo Simone and English speaking female with a PMHx of HTN, hyperlipidemia, DMII, hypothyroidism, known CAD with prior 3VCABG 2013 (LIMA-LAD, SVG-OM1, SVG-RCA (recent PCI MATTHEW to SVG-OM1 2/2020, otherwise patent grafts) multiple PCIs (18 stents, most recent as stated prior @ [facility]), ?chronic systolic CHF (EF 55% by cath 2/2020), history of VT s/p ICD (with most recent interrogation ______________) presented to [facility] ED 9/15/2020 c/o worsening exertional, left sided, non-radiating chest pressure with no associated symptoms. Pt states this is similar to cp prior to previous cardiac catheterizations. Pt found to be troponin negative x 1. ELKG revealing new TWI in I and aVL, otherwise unchanged. In light of pt risk factors, CCS Class II anginal symptoms, and long history of CAD with classic symptoms, pt is now planned for cardiac catheterization.     Denies bleeding, hematuria, hematochezia, melena. Pt reports compliance with aspirin 81mg and Plavix 75mg daily. Took both today.  Euvolemic, normal EF. NS @ 75cc/hour x 4h ordered pre-cath.   ISS ordered    Mallampati Class   ASA Class II  Pt is a suitable candidate for moderate sedation.   Risks & benefits of procedure and alternative therapy have been explained to the patient including but not limited to: allergic reaction, bleeding w/possible need for blood transfusion, infection, renal and vascular compromise, limb damage, arrhythmia, stroke, vessel dissection/perforation, Myocardial infarction, emergent CABG. Informed consent obtained and in chart.   69 yo Simone and English speaking female with a PMHx of HTN, hyperlipidemia, DMII, hypothyroidism, known CAD with prior 3VCABG 2013 (LIMA-LAD, SVG-OM1, SVG-RCA (recent PCI MATTHEW to SVG-OM1 2/2020, otherwise patent grafts) multiple PCIs (18 stents, most recent as stated prior @ [facility]), ?chronic systolic CHF (EF 55% by cath 2/2020), history of VT s/p ICD (with most recent interrogation 2 weeks ago with no events per pt) presented to [facility] ED 9/15/2020 c/o worsening exertional, left sided, non-radiating chest pressure with no associated symptoms. Pt states this is similar to cp prior to previous cardiac catheterizations. Pt found to be troponin negative x 1. ELKG revealing new TWI in I and aVL, otherwise unchanged. In light of pt risk factors, CCS Class II anginal symptoms, and long history of CAD with classic symptoms, pt is now planned for cardiac catheterization.     BP elevated in ED 190s/90s. Pt given nifedipine 30mg (home PM dose 60mg)  x 1 stat  Denies bleeding, hematuria, hematochezia, melena. Pt reports compliance with aspirin 81mg and Plavix 75mg daily. Took both today.  Euvolemic, normal EF. Cr 0.75. Will hold fluids for now in setting of HTN. Will continue to monitor pressures  ISS ordered  Cardiac catheterization consent obtained and in 11th floor PA office.     Mallampati Class III  ASA Class II  Pt is a suitable candidate for moderate sedation.   Risks & benefits of procedure and alternative therapy have been explained to the patient including but not limited to: allergic reaction, bleeding w/possible need for blood transfusion, infection, renal and vascular compromise, limb damage, arrhythmia, stroke, vessel dissection/perforation, Myocardial infarction, emergent CABG. Informed consent obtained and in chart.   69 yo Simone and English speaking female with a PMHx of HTN, hyperlipidemia, DMII, hypothyroidism, known CAD with prior 3VCABG 2013 (LIMA-LAD, SVG-OM1, SVG-RCA (recent PCI MATTHEW to SVG-OM1 2/2020, otherwise patent grafts) multiple PCIs (18 stents, most recent as stated prior @ [facility]), ?chronic systolic CHF (EF 55% by cath 2/2020), history of VT s/p ICD (with most recent interrogation 2 weeks ago with no events per pt) presented to [facility] ED 9/15/2020 c/o worsening exertional, left sided, non-radiating chest pressure with associated SOB upon 10 steps ambulation relieved by rest. Pt states this is similar to cp prior to previous cardiac catheterizations. Pt found to be troponin negative x 1. ELKG revealing new TWI in I and aVL, otherwise unchanged. In light of pt risk factors, CCS Class II anginal symptoms, and long history of CAD with classic symptoms, pt is now planned for cardiac catheterization.     BP elevated in ED 190s/90s. Pt given nifedipine 30mg (home PM dose 60mg)  x 1 stat  Denies bleeding, hematuria, hematochezia, melena. Pt reports compliance with aspirin 81mg and Plavix 75mg daily. Took both today.  Euvolemic, normal EF. Cr 0.75. Will hold fluids for now in setting of HTN. Will continue to monitor pressures  ISS ordered  Cardiac catheterization consent obtained and in 11th floor PA office.     Mallampati Class III  ASA Class II  Pt is a suitable candidate for moderate sedation.   Risks & benefits of procedure and alternative therapy have been explained to the patient including but not limited to: allergic reaction, bleeding w/possible need for blood transfusion, infection, renal and vascular compromise, limb damage, arrhythmia, stroke, vessel dissection/perforation, Myocardial infarction, emergent CABG. Informed consent obtained and in chart.

## 2020-09-15 NOTE — ED PROVIDER NOTE - DIAGNOSTIC INTERPRETATION
ER Physician: Albert Roberts (MD)   CHEST XRAY INTERPRETATION: AICD present. Enlarged heart w/ haziness over L lower lobe unchanged from previous. ?Soft tissue. Crisp and clear costophrenic angles. Normal bony structures.

## 2020-09-15 NOTE — ED PROVIDER NOTE - CLINICAL SUMMARY MEDICAL DECISION MAKING FREE TEXT BOX
71 y/o F w/ extensive PMHx CAD s/p 18 stents and quadruple bypass presents to the ED c/o 1 W of crescendo type exertional CP concerning for unstable angina. Pt currently w/ no pain and having an unremarkable physical exam. Vital signs stable in ED. EKG obtained and generally unchanged. Do not suspect PE, dissection or pneumothorax given clinical context. Plan is for labs, CXR, EKG and plan for admission for definitive treatment and workup to Cardiology.

## 2020-09-15 NOTE — ED PROVIDER NOTE - OBJECTIVE STATEMENT
71 y/o F PMHx extensive CAD s/p 18 stents, quadruple bypass (2008 done at [facility] By Dr. Pritesh Moss [phone]) presents to the ED w/ grandson c/o exertional L CP x 1 W. Pt reports that when she walks around the house she noticed her CP worsening that is described as a L chest "hardness." After resting, her CP improves. She took NTG last night which usually works for CP but it provided no relief yesterday. Denies pain radiating to arm/back, LE swelling, current CP, current SOB, coughing, fevers, chills, COVID sx. The last time she had this pain as in January when she had an angioplasty. Pain usually happens every 6 M. She has a nuclear stress test scheduled w/ Dr. Moss on 9/18/2020. Her PCP is Dr. Pacheco in [place]. She reports taking her meds today including Farxiga, Atorvastatin, Nifedipine, Metformin, Metoprolol, Levothyroxine, Plavix, aspirin, enalapril, isosorbide mononitrate, fluocinoline. Pt w/ allergy to penicillin.

## 2020-09-15 NOTE — H&P ADULT - HISTORY OF PRESENT ILLNESS
69 yo Simone and English speaking female with a PMHx of HTN, hyperlipidemia, DMII, hypothyroidism, known CAD with prior 3VCABG 2013 (LIMA-LAD, SVG-OM1, SVG-RCA (recent PCI MATTHEW to SVG-OM1 2/2020, otherwise patent grafts) multiple PCIs (18 stents, most recent as stated prior @ [facility]), ?chronic systolic CHF (EF 55% by cath 2/2020), history of VT s/p ICD (with most recent interrogation ______________) presented to [facility] ED 9/15/2020 c/o worsening exertional, left sided, non-radiating chest pressure with no associated symptoms. Pt states this is similar to cp prior to previous cardiac catheterizations. She denies palpitations, lightheadedness, SOB, n/v, diaphoresis, orthopnea, PND, LE edema, syncope. In ED: Vitals: 97.7Z-[id number]-XG61 – 176/84 – 99% RA  EKG:   Labs: COVID pending. Trop neg x 1. Otherwise unremarkable  CXR: Official read not yet performed. Appears to be clear. Interventions: None  Pt is now planned for cardiac catheterization.   cardiac catheterization (2/10/2020) revealing dLMCA 70%, pLAD 100%, pRamus 100%, pLCx 100%, mRCA 100%, LIMA-LAD patent, SVG-RPDA patent, SVG-OM1 99% s/p MATTHEW, L radial.     69 yo Simone and English speaking female with a PMHx of HTN, hyperlipidemia, DMII, hypothyroidism, known CAD with prior 3VCABG 2013 (LIMA-LAD, SVG-OM1, SVG-RCA (recent PCI MATTHEW to SVG-OM1 2/2020, otherwise patent grafts) multiple PCIs (18 stents, most recent as stated prior @ [facility]), ?chronic systolic CHF (EF 55% by cath 2/2020), history of VT s/p ICD (with most recent interrogation 2 weeks ago with no events per pt) presented to [facility] ED 9/15/2020 c/o worsening exertional, left sided, non-radiating chest pressure with no associated symptoms. Pt states this is similar to cp prior to previous cardiac catheterizations. She denies palpitations, lightheadedness, SOB, n/v, diaphoresis, orthopnea, PND, LE edema, syncope. In ED: Vitals: 97.8U-97pkw-HS69 – 176/84 – 99% RA  EKG: NSR @ 61bpm. New TWI in  I and aVL, old TWI in V2-V5. No ST elevations/depressions.  Labs: COVID pending. Trop neg x 1. Otherwise unremarkable  CXR: Official read not yet performed. Appears to be clear. Interventions: Nifedipine 30mg x 1 dose for /90  Pt is now planned for cardiac catheterization.   cardiac catheterization (2/10/2020) revealing dLMCA 70%, pLAD 100%, pRamus 100%, pLCx 100%, mRCA 100%, LIMA-LAD patent, SVG-RPDA patent, SVG-OM1 99% s/p MATTHEW, L radial.     69 yo Simone and English speaking female with a PMHx of HTN, hyperlipidemia, DMII, hypothyroidism, known CAD with prior 3VCABG 2013 (LIMA-LAD, SVG-OM1, SVG-RCA (recent PCI MATTHEW to SVG-OM1 2/2020, otherwise patent grafts) multiple PCIs (18 stents, most recent as stated prior @ [facility]), ?chronic systolic CHF (EF 55% by cath 2/2020), history of VT s/p ICD (with most recent interrogation 2 weeks ago with no events per pt) presented to [facility] ED 9/15/2020 c/o worsening exertional, left sided, non-radiating chest pressure with associated SOB upon 10 steps ambulation that is relieved by rest. Pt states this is similar to cp prior to previous cardiac catheterizations. She denies palpitations, lightheadedness, n/v, diaphoresis, orthopnea, PND, LE edema, syncope. In ED: Vitals: 97.7C-92deb-LP51 – 176/84 – 99% RA  EKG: NSR @ 61bpm. New TWI in  I and aVL, old TWI in V2-V5. No ST elevations/depressions.  Labs: COVID pending. Trop neg x 1. Otherwise unremarkable  CXR: Official read not yet performed. Appears to be clear. Interventions: Nifedipine 30mg x 1 dose for /90  Pt is now planned for cardiac catheterization.   cardiac catheterization (2/10/2020) revealing dLMCA 70%, pLAD 100%, pRamus 100%, pLCx 100%, mRCA 100%, LIMA-LAD patent, SVG-RPDA patent, SVG-OM1 99% s/p MATTHEW, L radial.

## 2020-09-15 NOTE — ED ADULT NURSE NOTE - OBJECTIVE STATEMENT
Patient with significant cardiac history complains of exertional chest pressure and shortness of breath that has not been relieved by her nitroglycerine. Symptoms have been occurring for the past week. ECG completed and reviewed by MD. Patient placed on cardiac monitor. Labs sent as ordered. At rest patient appears well, responds appropriately and does not appear to be in any acute distress. Patient with significant cardiac history complains of exertional chest pressure and shortness of breath that has not been relieved by her nitroglycerine. Symptoms have been occurring for the past week. ECG completed and reviewed by MD. Patient placed on cardiac monitor. Labs sent as ordered. At rest patient appears well, responds appropriately and does not appear to be in any acute distress. Pt took her prescribed aspirin and plavix this AM.

## 2020-09-15 NOTE — ED ADULT TRIAGE NOTE - CHIEF COMPLAINT QUOTE
left sided chest pain, no radiating. worse with exertion associated with SOB on exertion. denies cough, fever, chills. hx cardiac stents 6 months ago

## 2020-09-15 NOTE — ED PROVIDER NOTE - PMH
CAD in native artery    DM2 (diabetes mellitus, type 2)    H/O: HTN (hypertension)    Hypothyroidism

## 2020-09-15 NOTE — ED PROVIDER NOTE - CHPI ED SYMPTOMS NEG
no fever/Denies pain radiating to arm/back, LE swelling, current CP, current SOB, coughing, chills, COVID sx.

## 2020-09-16 ENCOUNTER — TRANSCRIPTION ENCOUNTER (OUTPATIENT)
Age: 70
End: 2020-09-16

## 2020-09-16 VITALS — TEMPERATURE: 97 F

## 2020-09-16 LAB
A1C WITH ESTIMATED AVERAGE GLUCOSE RESULT: 6.8 % — HIGH (ref 4–5.6)
ANION GAP SERPL CALC-SCNC: 14 MMOL/L — SIGNIFICANT CHANGE UP (ref 5–17)
BUN SERPL-MCNC: 12 MG/DL — SIGNIFICANT CHANGE UP (ref 7–23)
CALCIUM SERPL-MCNC: 9.4 MG/DL — SIGNIFICANT CHANGE UP (ref 8.4–10.5)
CHLORIDE SERPL-SCNC: 101 MMOL/L — SIGNIFICANT CHANGE UP (ref 96–108)
CO2 SERPL-SCNC: 24 MMOL/L — SIGNIFICANT CHANGE UP (ref 22–31)
CREAT SERPL-MCNC: 0.75 MG/DL — SIGNIFICANT CHANGE UP (ref 0.5–1.3)
ESTIMATED AVERAGE GLUCOSE: 148 MG/DL — HIGH (ref 68–114)
GLUCOSE BLDC GLUCOMTR-MCNC: 147 MG/DL — HIGH (ref 70–99)
GLUCOSE BLDC GLUCOMTR-MCNC: 174 MG/DL — HIGH (ref 70–99)
GLUCOSE SERPL-MCNC: 139 MG/DL — HIGH (ref 70–99)
HCT VFR BLD CALC: 44.4 % — SIGNIFICANT CHANGE UP (ref 34.5–45)
HGB BLD-MCNC: 14.2 G/DL — SIGNIFICANT CHANGE UP (ref 11.5–15.5)
MAGNESIUM SERPL-MCNC: 2.1 MG/DL — SIGNIFICANT CHANGE UP (ref 1.6–2.6)
MCHC RBC-ENTMCNC: 28.3 PG — SIGNIFICANT CHANGE UP (ref 27–34)
MCHC RBC-ENTMCNC: 32 GM/DL — SIGNIFICANT CHANGE UP (ref 32–36)
MCV RBC AUTO: 88.6 FL — SIGNIFICANT CHANGE UP (ref 80–100)
NRBC # BLD: 0 /100 WBCS — SIGNIFICANT CHANGE UP (ref 0–0)
PLATELET # BLD AUTO: 205 K/UL — SIGNIFICANT CHANGE UP (ref 150–400)
POTASSIUM SERPL-MCNC: 4.3 MMOL/L — SIGNIFICANT CHANGE UP (ref 3.5–5.3)
POTASSIUM SERPL-SCNC: 4.3 MMOL/L — SIGNIFICANT CHANGE UP (ref 3.5–5.3)
RBC # BLD: 5.01 M/UL — SIGNIFICANT CHANGE UP (ref 3.8–5.2)
RBC # FLD: 14.6 % — HIGH (ref 10.3–14.5)
SODIUM SERPL-SCNC: 139 MMOL/L — SIGNIFICANT CHANGE UP (ref 135–145)
WBC # BLD: 6.58 K/UL — SIGNIFICANT CHANGE UP (ref 3.8–10.5)
WBC # FLD AUTO: 6.58 K/UL — SIGNIFICANT CHANGE UP (ref 3.8–10.5)

## 2020-09-16 PROCEDURE — 90662 IIV NO PRSV INCREASED AG IM: CPT

## 2020-09-16 PROCEDURE — 99285 EMERGENCY DEPT VISIT HI MDM: CPT

## 2020-09-16 PROCEDURE — C1760: CPT

## 2020-09-16 PROCEDURE — 93010 ELECTROCARDIOGRAM REPORT: CPT

## 2020-09-16 PROCEDURE — 80048 BASIC METABOLIC PNL TOTAL CA: CPT

## 2020-09-16 PROCEDURE — 80053 COMPREHEN METABOLIC PANEL: CPT

## 2020-09-16 PROCEDURE — 36415 COLL VENOUS BLD VENIPUNCTURE: CPT

## 2020-09-16 PROCEDURE — C1887: CPT

## 2020-09-16 PROCEDURE — C1769: CPT

## 2020-09-16 PROCEDURE — 84100 ASSAY OF PHOSPHORUS: CPT

## 2020-09-16 PROCEDURE — 85730 THROMBOPLASTIN TIME PARTIAL: CPT

## 2020-09-16 PROCEDURE — 83036 HEMOGLOBIN GLYCOSYLATED A1C: CPT

## 2020-09-16 PROCEDURE — 84132 ASSAY OF SERUM POTASSIUM: CPT

## 2020-09-16 PROCEDURE — 85610 PROTHROMBIN TIME: CPT

## 2020-09-16 PROCEDURE — 82962 GLUCOSE BLOOD TEST: CPT

## 2020-09-16 PROCEDURE — 99239 HOSP IP/OBS DSCHRG MGMT >30: CPT

## 2020-09-16 PROCEDURE — U0003: CPT

## 2020-09-16 PROCEDURE — C1894: CPT

## 2020-09-16 PROCEDURE — 71045 X-RAY EXAM CHEST 1 VIEW: CPT

## 2020-09-16 PROCEDURE — 85025 COMPLETE CBC W/AUTO DIFF WBC: CPT

## 2020-09-16 PROCEDURE — 93005 ELECTROCARDIOGRAM TRACING: CPT

## 2020-09-16 PROCEDURE — 83735 ASSAY OF MAGNESIUM: CPT

## 2020-09-16 PROCEDURE — 84484 ASSAY OF TROPONIN QUANT: CPT

## 2020-09-16 PROCEDURE — 85027 COMPLETE CBC AUTOMATED: CPT

## 2020-09-16 RX ORDER — INFLUENZA VIRUS VACCINE 15; 15; 15; 15 UG/.5ML; UG/.5ML; UG/.5ML; UG/.5ML
0.7 SUSPENSION INTRAMUSCULAR ONCE
Refills: 0 | Status: COMPLETED | OUTPATIENT
Start: 2020-09-16 | End: 2020-09-16

## 2020-09-16 RX ORDER — METFORMIN HYDROCHLORIDE 850 MG/1
1 TABLET ORAL
Qty: 0 | Refills: 0 | DISCHARGE

## 2020-09-16 RX ORDER — INFLUENZA VIRUS VACCINE 15; 15; 15; 15 UG/.5ML; UG/.5ML; UG/.5ML; UG/.5ML
0.5 SUSPENSION INTRAMUSCULAR ONCE
Refills: 0 | Status: DISCONTINUED | OUTPATIENT
Start: 2020-09-16 | End: 2020-09-16

## 2020-09-16 RX ORDER — ISOSORBIDE MONONITRATE 60 MG/1
1 TABLET, EXTENDED RELEASE ORAL
Qty: 0 | Refills: 0 | DISCHARGE

## 2020-09-16 RX ORDER — NIFEDIPINE 30 MG
1 TABLET, EXTENDED RELEASE 24 HR ORAL
Qty: 0 | Refills: 0 | DISCHARGE

## 2020-09-16 RX ORDER — ISOSORBIDE MONONITRATE 60 MG/1
1 TABLET, EXTENDED RELEASE ORAL
Qty: 30 | Refills: 2
Start: 2020-09-16 | End: 2020-12-14

## 2020-09-16 RX ORDER — AMLODIPINE BESYLATE 2.5 MG/1
1 TABLET ORAL
Qty: 30 | Refills: 2
Start: 2020-09-16 | End: 2020-12-14

## 2020-09-16 RX ADMIN — Medication 2: at 11:30

## 2020-09-16 RX ADMIN — INFLUENZA VIRUS VACCINE 0.7 MILLILITER(S): 15; 15; 15; 15 SUSPENSION INTRAMUSCULAR at 15:18

## 2020-09-16 RX ADMIN — Medication 25 MICROGRAM(S): at 06:12

## 2020-09-16 RX ADMIN — CLOPIDOGREL BISULFATE 75 MILLIGRAM(S): 75 TABLET, FILM COATED ORAL at 09:47

## 2020-09-16 RX ADMIN — PREGABALIN 1000 MICROGRAM(S): 225 CAPSULE ORAL at 09:47

## 2020-09-16 RX ADMIN — Medication 100 MILLIGRAM(S): at 09:47

## 2020-09-16 RX ADMIN — Medication 81 MILLIGRAM(S): at 09:47

## 2020-09-16 NOTE — DISCHARGE NOTE PROVIDER - NSDCFUADDINST_GEN_ALL_CORE_FT
- Do NOT drive or operate hazardous machinery for 24 hours. Limit your physical activity for 24-48 hours. Do NOT engage in sports, heavy work or heavy lifting for 72 hours.   - You MAY shower BUT no TUB BATHS, HOT TUBS OR SWIMMING FOR 5 DAYS  - Your procedure was done through your right groin. If you observe flank bleeding from the puncture site, it is an emergency. Please put direct pressure on the site and go directly to the ER. Bleeding under the skin may also occur and a small "black and blue" may be expected. If the area appears to be expanding or swelling around the puncture site, apply manual compression and go immediately to the nearest ER. If your foot/leg becomes cool or blue and/or you are unable to move it, this must be treated as an emergency, go directly to the nearest ER. Look for signs of infection in the groin: fever, red streaking of the leg, obvious pus formation and pain.

## 2020-09-16 NOTE — DISCHARGE NOTE PROVIDER - HOSPITAL COURSE
71 yo Simone and English speaking female with a PMHx of HTN, hyperlipidemia, DMII, hypothyroidism, known CAD with prior 3VCABG 2013 (LIMA-LAD, SVG-OM1, SVG-RCA (recent PCI MATTHEW to SVG-OM1 2/2020, otherwise patent grafts) multiple PCIs (18 stents, most recent as stated prior @ [facility]), ?chronic systolic CHF (EF 55% by cath 2/2020), history of VT s/p ICD (with most recent interrogation 2 weeks ago with no events per pt) presented to [facility] ED 9/15/2020 c/o worsening exertional, left sided, non-radiating chest pressure with associated SOB upon 10 steps ambulation that is relieved by rest. Pt states this is similar to cp prior to previous cardiac catheterizations. In ED: Vitals: 97.5N-06cvl-[id number] – 176/84 – 99% RA  EKG: NSR @ 61bpm. New TWI in  I and aVL, old TWI in V2-V5. No ST elevations/depressions.  Labs: COVID pending. Trop neg x 1. Otherwise unremarkable  CXR: Official read not yet performed. Appears to be clear. Interventions: Nifedipine 30mg x 1 dose for /90  Pt is now planned for cardiac catheterization.   cardiac catheterization (2/10/2020) revealing dLMCA 70%, pLAD 100%, pRamus 100%, pLCx 100%, mRCA 100%, LIMA-LAD patent, SVG-RPDA patent, SVG-OM1 99% s/p MATTHEW, L radial.       69 yo Simone and English speaking female with a PMHx of HTN, hyperlipidemia, DMII, hypothyroidism, known CAD with prior 3VCABG 2013 (LIMA-LAD, SVG-OM1, SVG-RCA (recent PCI MATTHEW to SVG-OM1 2/2020, otherwise patent grafts) multiple PCIs (18 stents, most recent as stated prior @ [facility]), ?chronic systolic CHF (EF 55% by cath 2/2020), history of VT s/p ICD (with most recent interrogation 2 weeks ago with no events per pt) presented to [facility] ED 9/15/2020 c/o worsening exertional, left sided, non-radiating chest pressure with associated SOB upon 10 steps ambulation that is relieved by rest. Pt states this is similar to cp prior to previous cardiac catheterizations. In ED: Vitals: 97.1T-62uzq-GW98 – 176/84 – 99% RA  EKG: NSR @ 61bpm. New TWI in  I and aVL, old TWI in V2-V5. No ST elevations/depressions.  Labs:. Trop neg x 1. Otherwise unremarkable  CXR: Official read not yet performed. Appears to be clear. Interventions: Nifedipine 30mg x 1 dose for /90. Pt admitted for cardiac cath.   cardiac catheterization (2/10/2020) revealing dLMCA 70%, pLAD 100%, pRamus 100%, pLCx 100%, mRCA 100%, LIMA-LAD patent, SVG-RPDA patent, SVG-OM1 99% s/p MATTHEW, L radial.    During hospitalization, COVID negative. s/p cardiac cath 9/15/20: unchanged from previous cath: dLM 70%. pLAD 60%. pRamus 100%. pLCx 100%. mRCA 100%. LIMA-LAD patent. SVG-OM1 patent stent. SVG-RPDA patnet. EF 55%. EDP 8. R groin PC. R groin stable, no bleeding or hematoma. Patient no longer c/o chest pressure or dyspnea.     On the day of discharge, the patient was seen and examined. Symptoms improved. Vital signs are stable. Labs and imaging reviewed. Patient is medically optimized and hemodynamically stable. Return precautions discussed, medication teach back done w/patient, and importance of physician followup emphasized for which she verbalized understanding.     DC Meds:      71 yo Simone and English speaking female with a PMHx of HTN, hyperlipidemia, DMII, hypothyroidism, known CAD with prior 3VCABG 2013 (LIMA-LAD, SVG-OM1, SVG-RCA (recent PCI MATTHEW to SVG-OM1 2/2020, otherwise patent grafts) multiple PCIs (18 stents, most recent as stated prior @ [facility]), ?chronic systolic CHF (EF 55% by cath 2/2020), history of VT s/p ICD (with most recent interrogation 2 weeks ago with no events per pt) presented to [facility] ED 9/15/2020 c/o worsening exertional, left sided, non-radiating chest pressure with associated SOB upon 10 steps ambulation that is relieved by rest. Pt states this is similar to cp prior to previous cardiac catheterizations. In ED: Vitals: 97.3L-61nbf-ER55 – 176/84 – 99% RA  EKG: NSR @ 61bpm. New TWI in  I and aVL, old TWI in V2-V5. No ST elevations/depressions.  Labs:. Trop neg x 1. Otherwise unremarkable  CXR: Official read not yet performed. Appears to be clear. Interventions: Nifedipine 30mg x 1 dose for /90. Pt admitted for cardiac cath.   cardiac catheterization (2/10/2020) revealing dLMCA 70%, pLAD 100%, pRamus 100%, pLCx 100%, mRCA 100%, LIMA-LAD patent, SVG-RPDA patent, SVG-OM1 99% s/p MATTHEW, L radial.    During hospitalization, COVID negative. s/p cardiac cath 9/15/20: unchanged from previous cath: dLM 70%. pLAD 60%. pRamus 100%. pLCx 100%. mRCA 100%. LIMA-LAD patent. SVG-OM1 patent stent. SVG-RPDA patnet. EF 55%. EDP 8. R groin PC. R groin stable, no bleeding or hematoma. Patient no longer c/o chest pressure or dyspnea.     On the day of discharge, the patient was seen and examined. Symptoms improved. Vital signs are stable. Labs and imaging reviewed. Patient is medically optimized and hemodynamically stable. Return precautions discussed, medication teach back done w/patient, and importance of physician followup emphasized for which she verbalized understanding.

## 2020-09-16 NOTE — DISCHARGE NOTE PROVIDER - PROVIDER TOKENS
FREE:[LAST:[Isa],FIRST:[Pritesh],PHONE:[phone]:[(   [phone]    -],ADDRESS:[[address] [place] [address]],FOLLOWUP:[1 month]]

## 2020-09-16 NOTE — DISCHARGE NOTE PROVIDER - NSDCMRMEDTOKEN_GEN_ALL_CORE_FT
aspirin 81 mg oral tablet: 1 tab(s) orally once a day  atorvastatin 40 mg oral tablet: 1 tab(s) orally once a day   clopidogrel 75 mg oral tablet: 1 tab(s) orally once a day  Co Q-10 100 mg oral capsule: 1 cap(s) orally once a day  enalapril 10 mg oral tablet: 1 tab(s) orally once a day  Farxiga 5 mg oral tablet: 1 tab(s) orally once a day  fluocinolone 0.01% otic solution: 5 drop(s) to each affected ear 2 times a day  isosorbide mononitrate 30 mg oral tablet, extended release: 1 tab(s) orally once a day in evening  isosorbide mononitrate 30 mg oral tablet, extended release: 1 tab(s) orally once a day (in the morning)  levothyroxine 25 mcg (0.025 mg) oral tablet: 1 tab(s) orally once a day  metFORMIN 500 mg oral tablet, extended release: 1 tab(s) orally once a day (restart on 2/13/20 AM)  Metoprolol Succinate  mg oral tablet, extended release: 1 tab(s) orally once a day  NIFEdipine 60 mg oral tablet, extended release: 1 tab(s) orally once a day  Omega-3 1000 mg oral capsule: 1 cap(s) orally once a day  Vitamin B12 1000 mcg oral tablet: 1 tab(s) orally once a day   amLODIPine 10 mg oral tablet: 1 tab(s) orally once a day   aspirin 81 mg oral tablet: 1 tab(s) orally once a day  atorvastatin 40 mg oral tablet: 1 tab(s) orally once a day   clopidogrel 75 mg oral tablet: 1 tab(s) orally once a day  Co Q-10 100 mg oral capsule: 1 cap(s) orally once a day  enalapril 10 mg oral tablet: 1 tab(s) orally once a day  Farxiga 5 mg oral tablet: 1 tab(s) orally once a day  fluocinolone 0.01% otic solution: 5 drop(s) to each affected ear 2 times a day  isosorbide mononitrate 60 mg oral tablet, extended release: 1 tab(s) orally once a day   levothyroxine 25 mcg (0.025 mg) oral tablet: 1 tab(s) orally once a day  metFORMIN 500 mg oral tablet, extended release: 1 tab(s) orally once a day (restart on 9/18/20)  Metoprolol Succinate  mg oral tablet, extended release: 1 tab(s) orally once a day  Omega-3 1000 mg oral capsule: 1 cap(s) orally once a day  Vitamin B12 1000 mcg oral tablet: 1 tab(s) orally once a day

## 2020-09-16 NOTE — DISCHARGE NOTE PROVIDER - INSTRUCTIONS
- Have at least 2 cups of vegetables a day, at least 2 whole grains a day, 2 pieces of fruit a day, limiting red meat and processed meat to no more than twice per week, increasing fish intake to at least twice a week, having nuts and seeds on most days.

## 2020-09-16 NOTE — DISCHARGE NOTE NURSING/CASE MANAGEMENT/SOCIAL WORK - PATIENT PORTAL LINK FT
You can access the FollowMyHealth Patient Portal offered by [facility] by registering at the following website: [email or web address]. By joining [facility]’s FollowMyHealth portal, you will also be able to view your health information using other applications (apps) compatible with our system.

## 2020-09-16 NOTE — DISCHARGE NOTE PROVIDER - NSDCCPCAREPLAN_GEN_ALL_CORE_FT
PRINCIPAL DISCHARGE DIAGNOSIS  Diagnosis: Unstable angina pectoris due to coronary arteriosclerosis  Assessment and Plan of Treatment: - You presented to the hospital with chest pressure and shortness of breath. Labs were drawn and an EKG was performed and you DID NOT have a heart attack.  A cardiac cath was performed, and it is unchanged from your previous cath in February.  - Do NOT drive or operate hazardous machinery for 24 hours. Limit your physical activity for 24-48 hours. Do NOT engage in sports, heavy work or heavy lifting for 72 hours.   - You MAY shower BUT no TUB BATHS, HOT TUBS OR SWIMMING FOR 5 DAYS  - Your procedure was done through your right groin. If you observe flank bleeding from the puncture site, it is an emergency. Please put direct pressure on the site and go directly to the ER. Bleeding under the skin may also occur and a small "black and blue" may be expected. If the area appears to be expanding or swelling around the puncture site, apply manual compression and go immediately to the nearest ER. If your foot/leg becomes cool or blue and/or you are unable to move it, this must be treated as an emergency, go directly to the nearest ER. Look for signs of infection in the groin: fever, red streaking of the leg, obvious pus formation and pain.       PRINCIPAL DISCHARGE DIAGNOSIS  Diagnosis: Unstable angina pectoris due to coronary arteriosclerosis  Assessment and Plan of Treatment: - You presented to the hospital with chest pressure and shortness of breath. Labs were drawn and an EKG was performed and you DID NOT have a heart attack.  A cardiac cath was performed, and it is unchanged from your previous cath in February. Please follow up with your cardiologist within one month of discharge from the hospital.   - Do NOT drive or operate hazardous machinery for 24 hours. Limit your physical activity for 24-48 hours. Do NOT engage in sports, heavy work or heavy lifting for 72 hours.   - You MAY shower BUT no TUB BATHS, HOT TUBS OR SWIMMING FOR 5 DAYS  - Your procedure was done through your right groin. If you observe flank bleeding from the puncture site, it is an emergency. Please put direct pressure on the site and go directly to the ER. Bleeding under the skin may also occur and a small "black and blue" may be expected. If the area appears to be expanding or swelling around the puncture site, apply manual compression and go immediately to the nearest ER. If your foot/leg becomes cool or blue and/or you are unable to move it, this must be treated as an emergency, go directly to the nearest ER. Look for signs of infection in the groin: fever, red streaking of the leg, obvious pus formation and pain.       PRINCIPAL DISCHARGE DIAGNOSIS  Diagnosis: Unstable angina pectoris due to coronary arteriosclerosis  Assessment and Plan of Treatment: - You presented to the hospital with chest pressure and shortness of breath. Labs were drawn and an EKG was performed and you DID NOT have a heart attack.  A cardiac cath was performed, and it is unchanged from your previous cath in February. Please follow up with your cardiologist within one month of discharge from the hospital.   - Do NOT drive or operate hazardous machinery for 24 hours. Limit your physical activity for 24-48 hours. Do NOT engage in sports, heavy work or heavy lifting for 72 hours.   - You MAY shower BUT no TUB BATHS, HOT TUBS OR SWIMMING FOR 5 DAYS  - Your procedure was done through your right groin. If you observe flank bleeding from the puncture site, it is an emergency. Please put direct pressure on the site and go directly to the ER. Bleeding under the skin may also occur and a small "black and blue" may be expected. If the area appears to be expanding or swelling around the puncture site, apply manual compression and go immediately to the nearest ER. If your foot/leg becomes cool or blue and/or you are unable to move it, this must be treated as an emergency, go directly to the nearest ER. Look for signs of infection in the groin: fever, red streaking of the leg, obvious pus formation and pain.      SECONDARY DISCHARGE DIAGNOSES  Diagnosis: Hypertension  Assessment and Plan of Treatment: You have a known history of high blood pressure. As discussed you are to STOP Nifedipine and will START Amlodipine 10mg daily. Your medication aclled Imdur was also INCREASED from 30mg to 60mg daily. Please continue these medications as prescribed without missing doses:  - Amlodipine 10mg and Imdur 60mg.     PRINCIPAL DISCHARGE DIAGNOSIS  Diagnosis: Unstable angina pectoris due to coronary arteriosclerosis  Assessment and Plan of Treatment: - You presented to the hospital with chest pressure and shortness of breath. Labs were drawn and an EKG was performed and you DID NOT have a heart attack.  A cardiac cath was performed, and it is unchanged from your previous cath in February. Please follow up with your cardiologist within one month of discharge from the hospital.   - You may RESUME Metformin on 9/18/20.   - Do NOT drive or operate hazardous machinery for 24 hours. Limit your physical activity for 24-48 hours. Do NOT engage in sports, heavy work or heavy lifting for 72 hours.   - You MAY shower BUT no TUB BATHS, HOT TUBS OR SWIMMING FOR 5 DAYS  - Your procedure was done through your right groin. If you observe flank bleeding from the puncture site, it is an emergency. Please put direct pressure on the site and go directly to the ER. Bleeding under the skin may also occur and a small "black and blue" may be expected. If the area appears to be expanding or swelling around the puncture site, apply manual compression and go immediately to the nearest ER. If your foot/leg becomes cool or blue and/or you are unable to move it, this must be treated as an emergency, go directly to the nearest ER. Look for signs of infection in the groin: fever, red streaking of the leg, obvious pus formation and pain.      SECONDARY DISCHARGE DIAGNOSES  Diagnosis: Hypertension  Assessment and Plan of Treatment: You have a known history of high blood pressure. As discussed you are to STOP Nifedipine and will START Amlodipine 10mg daily. Your medication aclled Imdur was also INCREASED from 30mg to 60mg daily. Please continue these medications as prescribed without missing doses:  - Amlodipine 10mg and Imdur 60mg.     PRINCIPAL DISCHARGE DIAGNOSIS  Diagnosis: Unstable angina pectoris due to coronary arteriosclerosis  Assessment and Plan of Treatment: - You presented to the hospital with chest pressure and shortness of breath. Labs were drawn and an EKG was performed and you DID NOT have a heart attack.  A cardiac cath was performed, and it is unchanged from your previous cath in February. Please follow up with your cardiologist within one month of discharge from the hospital.   - You may RESUME Metformin on 9/18/20.   - Do NOT drive or operate hazardous machinery for 24 hours. Limit your physical activity for 24-48 hours. Do NOT engage in sports, heavy work or heavy lifting for 72 hours.   - You MAY shower BUT no TUB BATHS, HOT TUBS OR SWIMMING FOR 5 DAYS  - Your procedure was done through your right groin. If you observe flank bleeding from the puncture site, it is an emergency. Please put direct pressure on the site and go directly to the ER. Bleeding under the skin may also occur and a small "black and blue" may be expected. If the area appears to be expanding or swelling around the puncture site, apply manual compression and go immediately to the nearest ER. If your foot/leg becomes cool or blue and/or you are unable to move it, this must be treated as an emergency, go directly to the nearest ER. Look for signs of infection in the groin: fever, red streaking of the leg, obvious pus formation and pain.      SECONDARY DISCHARGE DIAGNOSES  Diagnosis: Hypertension  Assessment and Plan of Treatment: You have a known history of high blood pressure. As discussed you are to STOP Nifedipine and will START Amlodipine 10mg daily. Your medication called Imdur was also INCREASED from 30mg to 60mg daily. Please continue these medications as prescribed without missing doses:  - Amlodipine 10mg and Imdur 60mg.

## 2020-09-16 NOTE — DISCHARGE NOTE PROVIDER - CARE PROVIDER_API CALL
Pritesh Moss  [address]  [place] [address]  Phone: [phone]  Fax: (   )    -  Follow Up Time: 1 month

## 2020-09-18 DIAGNOSIS — Z95.1 PRESENCE OF AORTOCORONARY BYPASS GRAFT: Chronic | ICD-10-CM

## 2020-09-21 DIAGNOSIS — Z79.01 LONG TERM (CURRENT) USE OF ANTICOAGULANTS: ICD-10-CM

## 2020-09-21 DIAGNOSIS — Z79.84 LONG TERM (CURRENT) USE OF ORAL HYPOGLYCEMIC DRUGS: ICD-10-CM

## 2020-09-21 DIAGNOSIS — Z95.5 PRESENCE OF CORONARY ANGIOPLASTY IMPLANT AND GRAFT: ICD-10-CM

## 2020-09-21 DIAGNOSIS — Z95.810 PRESENCE OF AUTOMATIC (IMPLANTABLE) CARDIAC DEFIBRILLATOR: ICD-10-CM

## 2020-09-21 DIAGNOSIS — E11.9 TYPE 2 DIABETES MELLITUS WITHOUT COMPLICATIONS: ICD-10-CM

## 2020-09-21 DIAGNOSIS — Z88.0 ALLERGY STATUS TO PENICILLIN: ICD-10-CM

## 2020-09-21 DIAGNOSIS — I10 ESSENTIAL (PRIMARY) HYPERTENSION: ICD-10-CM

## 2020-09-21 DIAGNOSIS — Z95.1 PRESENCE OF AORTOCORONARY BYPASS GRAFT: ICD-10-CM

## 2020-09-21 DIAGNOSIS — Z79.82 LONG TERM (CURRENT) USE OF ASPIRIN: ICD-10-CM

## 2020-09-21 DIAGNOSIS — R07.9 CHEST PAIN, UNSPECIFIED: ICD-10-CM

## 2020-09-21 DIAGNOSIS — E03.9 HYPOTHYROIDISM, UNSPECIFIED: ICD-10-CM

## 2020-09-21 DIAGNOSIS — I25.110 ATHEROSCLEROTIC HEART DISEASE OF NATIVE CORONARY ARTERY WITH UNSTABLE ANGINA PECTORIS: ICD-10-CM

## 2020-11-20 ENCOUNTER — APPOINTMENT (OUTPATIENT)
Dept: ELECTROPHYSIOLOGY | Facility: CLINIC | Age: 70
End: 2020-11-20
Payer: COMMERCIAL

## 2020-11-20 PROCEDURE — 93295 DEV INTERROG REMOTE 1/2/MLT: CPT

## 2020-11-20 PROCEDURE — 93296 REM INTERROG EVL PM/IDS: CPT

## 2020-11-27 NOTE — DISCHARGE NOTE PROVIDER - INSTRUCTIONS
26-Nov-2020 23:50 A Mediterranean Diet is recommended! Some suggestions include continue incorporating 2 or more servings per day of vegetables, fruits, and whole grains. Increase intake of fish and legumes/beans to 2 or more servings per week. Aim to increase intake of healthy fats, such as olive oil and avocados, and have a handful of nuts/seeds most days. Reduce red/processed meat consumption to 2 or fewer times per week.

## 2020-12-29 ENCOUNTER — INPATIENT (INPATIENT)
Facility: HOSPITAL | Age: 70
LOS: 1 days | Discharge: HOME HEALTH | DRG: 287 | End: 2020-12-31
Attending: INTERNAL MEDICINE | Admitting: INTERNAL MEDICINE
Payer: MEDICARE

## 2020-12-29 VITALS
RESPIRATION RATE: 18 BRPM | SYSTOLIC BLOOD PRESSURE: 174 MMHG | HEART RATE: 62 BPM | HEIGHT: 62 IN | TEMPERATURE: 98 F | WEIGHT: 220.02 LBS | DIASTOLIC BLOOD PRESSURE: 91 MMHG | OXYGEN SATURATION: 98 %

## 2020-12-29 DIAGNOSIS — Z95.5 PRESENCE OF CORONARY ANGIOPLASTY IMPLANT AND GRAFT: Chronic | ICD-10-CM

## 2020-12-29 DIAGNOSIS — Z86.79 PERSONAL HISTORY OF OTHER DISEASES OF THE CIRCULATORY SYSTEM: ICD-10-CM

## 2020-12-29 DIAGNOSIS — Z95.810 PRESENCE OF AUTOMATIC (IMPLANTABLE) CARDIAC DEFIBRILLATOR: ICD-10-CM

## 2020-12-29 DIAGNOSIS — I24.9 ACUTE ISCHEMIC HEART DISEASE, UNSPECIFIED: ICD-10-CM

## 2020-12-29 DIAGNOSIS — I25.10 ATHEROSCLEROTIC HEART DISEASE OF NATIVE CORONARY ARTERY WITHOUT ANGINA PECTORIS: ICD-10-CM

## 2020-12-29 DIAGNOSIS — E78.5 HYPERLIPIDEMIA, UNSPECIFIED: ICD-10-CM

## 2020-12-29 DIAGNOSIS — Z95.1 PRESENCE OF AORTOCORONARY BYPASS GRAFT: Chronic | ICD-10-CM

## 2020-12-29 DIAGNOSIS — E03.9 HYPOTHYROIDISM, UNSPECIFIED: ICD-10-CM

## 2020-12-29 DIAGNOSIS — E11.9 TYPE 2 DIABETES MELLITUS WITHOUT COMPLICATIONS: ICD-10-CM

## 2020-12-29 LAB
ALBUMIN SERPL ELPH-MCNC: 3.9 G/DL — SIGNIFICANT CHANGE UP (ref 3.3–5)
ALP SERPL-CCNC: 81 U/L — SIGNIFICANT CHANGE UP (ref 40–120)
ALT FLD-CCNC: 28 U/L — SIGNIFICANT CHANGE UP (ref 10–45)
ANION GAP SERPL CALC-SCNC: 13 MMOL/L — SIGNIFICANT CHANGE UP (ref 5–17)
APTT BLD: 132.2 SEC — CRITICAL HIGH (ref 27.5–35.5)
APTT BLD: 30.4 SEC — SIGNIFICANT CHANGE UP (ref 27.5–35.5)
AST SERPL-CCNC: 28 U/L — SIGNIFICANT CHANGE UP (ref 10–40)
BASOPHILS # BLD AUTO: 0.03 K/UL — SIGNIFICANT CHANGE UP (ref 0–0.2)
BASOPHILS NFR BLD AUTO: 0.5 % — SIGNIFICANT CHANGE UP (ref 0–2)
BILIRUB SERPL-MCNC: 0.3 MG/DL — SIGNIFICANT CHANGE UP (ref 0.2–1.2)
BUN SERPL-MCNC: 14 MG/DL — SIGNIFICANT CHANGE UP (ref 7–23)
CALCIUM SERPL-MCNC: 9.1 MG/DL — SIGNIFICANT CHANGE UP (ref 8.4–10.5)
CHLORIDE SERPL-SCNC: 104 MMOL/L — SIGNIFICANT CHANGE UP (ref 96–108)
CK MB CFR SERPL CALC: 1.4 NG/ML — SIGNIFICANT CHANGE UP (ref 0–6.7)
CK SERPL-CCNC: 74 U/L — SIGNIFICANT CHANGE UP (ref 25–170)
CK SERPL-CCNC: 79 U/L — SIGNIFICANT CHANGE UP (ref 25–170)
CO2 SERPL-SCNC: 25 MMOL/L — SIGNIFICANT CHANGE UP (ref 22–31)
CREAT SERPL-MCNC: 0.75 MG/DL — SIGNIFICANT CHANGE UP (ref 0.5–1.3)
EOSINOPHIL # BLD AUTO: 0.17 K/UL — SIGNIFICANT CHANGE UP (ref 0–0.5)
EOSINOPHIL NFR BLD AUTO: 2.8 % — SIGNIFICANT CHANGE UP (ref 0–6)
GLUCOSE SERPL-MCNC: 115 MG/DL — HIGH (ref 70–99)
HCT VFR BLD CALC: 41 % — SIGNIFICANT CHANGE UP (ref 34.5–45)
HCT VFR BLD CALC: 41.5 % — SIGNIFICANT CHANGE UP (ref 34.5–45)
HGB BLD-MCNC: 13.2 G/DL — SIGNIFICANT CHANGE UP (ref 11.5–15.5)
HGB BLD-MCNC: 13.5 G/DL — SIGNIFICANT CHANGE UP (ref 11.5–15.5)
IMM GRANULOCYTES NFR BLD AUTO: 0.2 % — SIGNIFICANT CHANGE UP (ref 0–1.5)
INR BLD: 0.93 — SIGNIFICANT CHANGE UP (ref 0.88–1.16)
LYMPHOCYTES # BLD AUTO: 1.66 K/UL — SIGNIFICANT CHANGE UP (ref 1–3.3)
LYMPHOCYTES # BLD AUTO: 27.4 % — SIGNIFICANT CHANGE UP (ref 13–44)
MCHC RBC-ENTMCNC: 28.8 PG — SIGNIFICANT CHANGE UP (ref 27–34)
MCHC RBC-ENTMCNC: 29 PG — SIGNIFICANT CHANGE UP (ref 27–34)
MCHC RBC-ENTMCNC: 32.2 GM/DL — SIGNIFICANT CHANGE UP (ref 32–36)
MCHC RBC-ENTMCNC: 32.5 GM/DL — SIGNIFICANT CHANGE UP (ref 32–36)
MCV RBC AUTO: 88.7 FL — SIGNIFICANT CHANGE UP (ref 80–100)
MCV RBC AUTO: 90.1 FL — SIGNIFICANT CHANGE UP (ref 80–100)
MONOCYTES # BLD AUTO: 0.55 K/UL — SIGNIFICANT CHANGE UP (ref 0–0.9)
MONOCYTES NFR BLD AUTO: 9.1 % — SIGNIFICANT CHANGE UP (ref 2–14)
NEUTROPHILS # BLD AUTO: 3.64 K/UL — SIGNIFICANT CHANGE UP (ref 1.8–7.4)
NEUTROPHILS NFR BLD AUTO: 60 % — SIGNIFICANT CHANGE UP (ref 43–77)
NRBC # BLD: 0 /100 WBCS — SIGNIFICANT CHANGE UP (ref 0–0)
NRBC # BLD: 0 /100 WBCS — SIGNIFICANT CHANGE UP (ref 0–0)
PLATELET # BLD AUTO: 185 K/UL — SIGNIFICANT CHANGE UP (ref 150–400)
PLATELET # BLD AUTO: 189 K/UL — SIGNIFICANT CHANGE UP (ref 150–400)
POTASSIUM SERPL-MCNC: 4.3 MMOL/L — SIGNIFICANT CHANGE UP (ref 3.5–5.3)
POTASSIUM SERPL-SCNC: 4.3 MMOL/L — SIGNIFICANT CHANGE UP (ref 3.5–5.3)
PROT SERPL-MCNC: 7.3 G/DL — SIGNIFICANT CHANGE UP (ref 6–8.3)
PROTHROM AB SERPL-ACNC: 11.2 SEC — SIGNIFICANT CHANGE UP (ref 10.6–13.6)
RBC # BLD: 4.55 M/UL — SIGNIFICANT CHANGE UP (ref 3.8–5.2)
RBC # BLD: 4.68 M/UL — SIGNIFICANT CHANGE UP (ref 3.8–5.2)
RBC # FLD: 14 % — SIGNIFICANT CHANGE UP (ref 10.3–14.5)
RBC # FLD: 14.1 % — SIGNIFICANT CHANGE UP (ref 10.3–14.5)
SARS-COV-2 RNA SPEC QL NAA+PROBE: SIGNIFICANT CHANGE UP
SODIUM SERPL-SCNC: 142 MMOL/L — SIGNIFICANT CHANGE UP (ref 135–145)
TROPONIN T SERPL-MCNC: <0.01 NG/ML — SIGNIFICANT CHANGE UP (ref 0–0.01)
TROPONIN T SERPL-MCNC: <0.01 NG/ML — SIGNIFICANT CHANGE UP (ref 0–0.01)
WBC # BLD: 5.47 K/UL — SIGNIFICANT CHANGE UP (ref 3.8–10.5)
WBC # BLD: 6.06 K/UL — SIGNIFICANT CHANGE UP (ref 3.8–10.5)
WBC # FLD AUTO: 5.47 K/UL — SIGNIFICANT CHANGE UP (ref 3.8–10.5)
WBC # FLD AUTO: 6.06 K/UL — SIGNIFICANT CHANGE UP (ref 3.8–10.5)

## 2020-12-29 PROCEDURE — 93010 ELECTROCARDIOGRAM REPORT: CPT

## 2020-12-29 PROCEDURE — 99285 EMERGENCY DEPT VISIT HI MDM: CPT

## 2020-12-29 RX ORDER — CLOPIDOGREL BISULFATE 75 MG/1
75 TABLET, FILM COATED ORAL DAILY
Refills: 0 | Status: DISCONTINUED | OUTPATIENT
Start: 2020-12-30 | End: 2020-12-31

## 2020-12-29 RX ORDER — ISOSORBIDE MONONITRATE 60 MG/1
60 TABLET, EXTENDED RELEASE ORAL DAILY
Refills: 0 | Status: DISCONTINUED | OUTPATIENT
Start: 2020-12-30 | End: 2020-12-30

## 2020-12-29 RX ORDER — HEPARIN SODIUM 5000 [USP'U]/ML
5000 INJECTION INTRAVENOUS; SUBCUTANEOUS ONCE
Refills: 0 | Status: COMPLETED | OUTPATIENT
Start: 2020-12-29 | End: 2020-12-29

## 2020-12-29 RX ORDER — ATORVASTATIN CALCIUM 80 MG/1
40 TABLET, FILM COATED ORAL AT BEDTIME
Refills: 0 | Status: DISCONTINUED | OUTPATIENT
Start: 2020-12-29 | End: 2020-12-30

## 2020-12-29 RX ORDER — HYDRALAZINE HCL 50 MG
25 TABLET ORAL ONCE
Refills: 0 | Status: COMPLETED | OUTPATIENT
Start: 2020-12-29 | End: 2020-12-29

## 2020-12-29 RX ORDER — DAPAGLIFLOZIN 10 MG/1
1 TABLET, FILM COATED ORAL
Qty: 0 | Refills: 0 | DISCHARGE

## 2020-12-29 RX ORDER — HEPARIN SODIUM 5000 [USP'U]/ML
INJECTION INTRAVENOUS; SUBCUTANEOUS
Qty: 25000 | Refills: 0 | Status: DISCONTINUED | OUTPATIENT
Start: 2020-12-29 | End: 2020-12-30

## 2020-12-29 RX ORDER — UBIDECARENONE 100 MG
1 CAPSULE ORAL
Qty: 0 | Refills: 0 | DISCHARGE

## 2020-12-29 RX ORDER — METOPROLOL TARTRATE 50 MG
100 TABLET ORAL DAILY
Refills: 0 | Status: DISCONTINUED | OUTPATIENT
Start: 2020-12-30 | End: 2020-12-31

## 2020-12-29 RX ORDER — AMLODIPINE BESYLATE 2.5 MG/1
10 TABLET ORAL ONCE
Refills: 0 | Status: COMPLETED | OUTPATIENT
Start: 2020-12-29 | End: 2020-12-29

## 2020-12-29 RX ORDER — AMLODIPINE BESYLATE 2.5 MG/1
10 TABLET ORAL DAILY
Refills: 0 | Status: DISCONTINUED | OUTPATIENT
Start: 2020-12-30 | End: 2020-12-31

## 2020-12-29 RX ORDER — FLUOCINOLONE ACETONIDE 0.11 MG/20ML
5 OIL AURICULAR (OTIC)
Qty: 0 | Refills: 0 | DISCHARGE

## 2020-12-29 RX ORDER — LEVOTHYROXINE SODIUM 125 MCG
25 TABLET ORAL DAILY
Refills: 0 | Status: DISCONTINUED | OUTPATIENT
Start: 2020-12-30 | End: 2020-12-31

## 2020-12-29 RX ORDER — PREGABALIN 225 MG/1
1 CAPSULE ORAL
Qty: 0 | Refills: 0 | DISCHARGE

## 2020-12-29 RX ORDER — LEVOTHYROXINE SODIUM 125 MCG
1 TABLET ORAL
Qty: 0 | Refills: 0 | DISCHARGE

## 2020-12-29 RX ORDER — ASPIRIN/CALCIUM CARB/MAGNESIUM 324 MG
81 TABLET ORAL DAILY
Refills: 0 | Status: DISCONTINUED | OUTPATIENT
Start: 2020-12-30 | End: 2020-12-31

## 2020-12-29 RX ORDER — METFORMIN HYDROCHLORIDE 850 MG/1
1 TABLET ORAL
Qty: 0 | Refills: 0 | DISCHARGE

## 2020-12-29 RX ORDER — OMEGA-3 ACID ETHYL ESTERS 1 G
1 CAPSULE ORAL
Qty: 0 | Refills: 0 | DISCHARGE

## 2020-12-29 RX ADMIN — Medication 25 MILLIGRAM(S): at 23:15

## 2020-12-29 RX ADMIN — ATORVASTATIN CALCIUM 40 MILLIGRAM(S): 80 TABLET, FILM COATED ORAL at 22:09

## 2020-12-29 RX ADMIN — HEPARIN SODIUM 5000 UNIT(S): 5000 INJECTION INTRAVENOUS; SUBCUTANEOUS at 15:02

## 2020-12-29 RX ADMIN — HEPARIN SODIUM 1000 UNIT(S)/HR: 5000 INJECTION INTRAVENOUS; SUBCUTANEOUS at 15:02

## 2020-12-29 RX ADMIN — AMLODIPINE BESYLATE 10 MILLIGRAM(S): 2.5 TABLET ORAL at 15:46

## 2020-12-29 RX ADMIN — HEPARIN SODIUM 0 UNIT(S)/HR: 5000 INJECTION INTRAVENOUS; SUBCUTANEOUS at 22:45

## 2020-12-29 NOTE — H&P ADULT - PROBLEM SELECTOR PLAN 5
-On Farxiga 5mg QD and Metformin ER 500mg QD at home  -HbA1c 7.0 12/29/2020  -Sliding scale insulin ordered  -Hold oral hypoglycemic meds -[facility], placed in 2013 for VT  -ICD last interpreted in 8/2020 showing no events. Managed by Dr. Rosales @[facility]

## 2020-12-29 NOTE — H&P ADULT - ASSESSMENT
History and consent obtained with help of daughter Roxanne translating [phone]    71 y/o Female with PMH of CAD s/p 19 stents, NSTEMI in the setting of COVID managed medically during admission 9/15-9/16/2020 @[facility], 3VCABG in 2008 (LIMA-LAD, SVG-OM1, SVG-RCA) chronic angina, Hx of VT s/p ICD placement ([facility] Placed 2013, last interpreted 9/2020 with no events per pt), T2DM, HTN, HLD, and Hypothyroidism presents to [facility] ED c/o worsening angina x1 week. Pt started on Heparin ggt in ED and admitted to [facility] for further management of cardiac ischemia. Pt reports good compliance with Aspirin 81mg x1 daily and Plavix 75mg x1 daily, and states that her last dose of both was the morning of 12/29/2020.      Consent obtained w/ help from daughter Roxanne as . Consent in 11th floor PA office. History and consent obtained with help of daughter Roxanne translating [phone]    69 y/o Female with PMH of CAD s/p 19 stents, NSTEMI in the setting of COVID managed medically during admission 9/15-9/16/2020 @[facility], 3VCABG in 2008 (LIMA-LAD, SVG-OM1, SVG-RCA) chronic angina, Hx of VT s/p ICD placement ([facility] Placed 2013, last interpreted 9/2020 with no events per pt), T2DM, HTN, HLD, and Hypothyroidism presents to [facility] ED c/o worsening angina x1 week. Pt started on Heparin ggt in ED and admitted to [facility] for further management of cardiac ischemia. Pt reports good compliance with Aspirin 81mg x1 daily and Plavix 75mg x1 daily, and states that her last dose of both was the morning of 12/29/2020.    Consent obtained w/ help from daughter Roxanne as . Consent in 11th floor PA office.

## 2020-12-29 NOTE — H&P ADULT - PROBLEM SELECTOR PLAN 1
-On Metoprolol Succinate ER 100mg QD, Isosorbide mononitrate ER 60mg QD, Aspirin 81mg QD, Plavix 75mg QD, and Atorvastatin 40mg QD  -s/p 19 stents mostly placed @[facility]  -3VCAB in 2008 (LIMA-LAD, SVG-OM1, SVG-RCA)  -NSTEMI in the setting of COVID managed medically during admission 9/15-9/16/2020 @[facility]  -Cardiac cath 9/15/2020: (unchanged from previous cath in 2/10/2020) dLM 70%. pLAD 60%. pRamus 100%. pLCx 100%. mRCA 100%. LIMA-LAD patent. SVG-OM1 patent stent. SVG-RPDA patnet. EF 55%. EDP 8.  -Pt NPO with plan for cardiac cath w/ Dr. Perry later today (12/29)  -IV NS @75cc/hr x6hrs ordered for pre-cath hydration. Pt reports good compliance with ASA/Plavix w/ last dose morning of 12/29 -On Metoprolol Succinate ER 100mg QD, Isosorbide mononitrate ER 60mg QD, Aspirin 81mg QD, Plavix 75mg QD, and Atorvastatin 40mg QD  -s/p 19 stents mostly placed @[facility]  -3VCABG in 2008 (LIMA-LAD, SVG-OM1, SVG-RCA)  -NSTEMI in the setting of COVID managed medically during admission 9/15-9/16/2020 @[facility]  -Cardiac cath 9/15/2020: (unchanged from previous cath in 2/10/2020) dLM 70%. pLAD 60%. pRamus 100%. pLCx 100%. mRCA 100%. LIMA-LAD patent. SVG-OM1 patent stent. SVG-RPDA patnet. EF 55%. EDP 8.  -Last Echo 2/11/2020: EF 71%. Normal LV size and systolic function, mildly dilated right ventricle, normal RV systolic function, moderate-to-severe tricuspid regurgitation pulmonary hypertension present, PASP 42, no pericardial effusion, right heart device leads.  -New LBBB compared to prior EKG from 9/2020  -Trop negative #1, 2nd trop ordered. f/u  -Pt NPO with plan for cardiac cath w/ Dr. Perry later today (12/29)  -IV NS @75cc/hr x6hrs ordered for pre-cath hydration. Pt reports good compliance with ASA/Plavix w/ last dose morning of 12/29 -On Metoprolol Succinate ER 100mg QD, Isosorbide mononitrate ER 60mg QD, Aspirin 81mg QD, Plavix 75mg QD, and Atorvastatin 40mg QD  -s/p 19 stents mostly placed @[facility]  -3VCABG in 2008 (LIMA-LAD, SVG-OM1, SVG-RCA)  -NSTEMI in the setting of COVID managed medically during admission 9/15-9/16/2020 @[facility]  -Cardiac cath 9/15/2020: (unchanged from previous cath in 2/10/2020) dLM 70%. pLAD 60%. pRamus 100%. pLCx 100%. mRCA 100%. LIMA-LAD patent. SVG-OM1 patent stent. SVG-RPDA patnet. EF 55%. EDP 8.  -Last Echo 2/11/2020: EF 71%. Normal LV size and systolic function, mildly dilated right ventricle, normal RV systolic function, moderate-to-severe tricuspid regurgitation pulmonary hypertension present, PASP 42, no pericardial effusion, right heart device leads.  -New LBBB compared to prior EKG from 9/2020  -Trop negative #1, 2nd trop ordered. f/u  -NPO after midnight with plan for cardiac cath w/ Dr. Perry 12/30  -Pt reports good compliance with ASA/Plavix w/ last dose morning of 12/29  -Echo ordered, f/u -On Metoprolol Succinate ER 100mg QD, Isosorbide mononitrate ER 60mg QD, Aspirin 81mg QD, Plavix 75mg QD, and Atorvastatin 40mg QD at home  -NSTEMI in the setting of COVID managed medically during admission 9/15-9/16/2020 @[facility]  -Cardiac cath 9/15/2020: (unchanged from previous cath in 2/10/2020) dLM 70%. pLAD 60%. pRamus 100%. pLCx 100%. mRCA 100%. LIMA-LAD patent. SVG-OM1 patent stent. SVG-RPDA patnet. EF 55%. EDP 8.  -Last Echo 2/11/2020: EF 71%. Mildly dilated RV w/ normal systolic function, moderate-severe TR, pHTN present w/ PASP 42, no pericardial effusion, right heart device leads.  -New LBBB compared to prior EKG from 9/2020  -Trop negative #1, 2nd trop pending, f/u 3rd trop @10pm  -NPO after midnight with plan for cardiac cath w/ Dr. Perry 12/30  -Pt reports good compliance with ASA/Plavix w/ last dose morning of 12/29  -Echo ordered, f/u

## 2020-12-29 NOTE — ED PROVIDER NOTE - MUSCULOSKELETAL, MLM
Spine appears normal, range of motion is not limited, no muscle or joint tenderness; trace BLE edema.

## 2020-12-29 NOTE — PATIENT PROFILE ADULT - NSPROIMPLANTSMEDDEV_GEN_A_NUR
Artificial joint/Vascular stents/Clips Automatic Implantable Cardioverter Defibrillator/Artificial joint/Vascular stents/Clips

## 2020-12-29 NOTE — H&P ADULT - NSICDXPASTMEDICALHX_GEN_ALL_CORE_FT
PAST MEDICAL HISTORY:  CAD in native artery     DM2 (diabetes mellitus, type 2)     H/O: HTN (hypertension)     Hypothyroidism     ICD (implantable cardioverter-defibrillator) in place

## 2020-12-29 NOTE — H&P ADULT - PROBLEM SELECTOR PROBLEM 5
Hypothyroidism DM2 (diabetes mellitus, type 2) ICD (implantable cardioverter-defibrillator) in place

## 2020-12-29 NOTE — H&P ADULT - NSHPLABSRESULTS_GEN_ALL_CORE
12-29    142  |  104  |  14  ----------------------------<  115<H>  4.3   |  25  |  0.75    Ca    9.1      29 Dec 2020 11:34    TPro  7.3  /  Alb  3.9  /  TBili  0.3  /  DBili  x   /  AST  28  /  ALT  28  /  AlkPhos  81  12-29                            13.2   6.06  )-----------( 185      ( 29 Dec 2020 11:34 )             41.0       PT/INR - ( 29 Dec 2020 14:20 )   PT: 11.2 sec;   INR: 0.93     PTT - ( 29 Dec 2020 14:20 )  PTT:30.4 sec      CARDIAC MARKERS ( 29 Dec 2020 11:34 )  x     / <0.01 ng/mL / x     / x     / x

## 2020-12-29 NOTE — ED ADULT TRIAGE NOTE - CHIEF COMPLAINT QUOTE
pt arriving to ED with daughter for c/c " chest pain for several days". hx angina, cardiac stents, bypass in 2008, HTN, DM, ICD. per daughter, pt has had intermittent bouts of chest pain since sunday. per daughter, has had to use nitro spray more frequently which is worrying her. Last applied nitro patch 0700 today. Pt currently denies CP, endorsing BROWN. Denies n/v/d, fever, chills, cough.

## 2020-12-29 NOTE — H&P ADULT - HISTORY OF PRESENT ILLNESS
71 y/o Female with PMH of CAD s/p 19 stents, 3VCABG in 2013 (LIMA-LAD, SVG-OM1, SVG-RCA) chronic angina, Hx of VT s/p ICD placement (last interpreted 9/2020 with no events per pt), non-insulin dependent T2DM, HTN, HLD, and Hypothyroidism presents to [facility] ED c/o worsening angina x1 week. Pt states that these episodes are usually alleviated with SL NTG, however this week the chest pain has been lasting approximately 30 minutes before resolving, and is not responsive to SL NTG x2. Pt's daughter reports she has seen the pt lean over clutching her chest and appearing unwell recently. Pt denies ___________.    EKG on arrival ________. Echo/CXR__________. Labs:  Troponin T neg x1.__________________. Physical exam significant for _____________. Pt started on Heparin ggt in ED and admitted to [facility] for further management of cardiac ischemia.    Cardiac cath 9/15/2020: (unchanged from previous cath in 2/2020) dLM 70%. pLAD 60%. pRamus 100%. pLCx 100%. mRCA 100%. LIMA-LAD patent. SVG-OM1 patent stent. SVG-RPDA patnet. EF 55%. EDP 8.        _____________________  71 yo Simone and English speaking female with a PMHx of HTN, hyperlipidemia, DMII, hypothyroidism, known CAD with prior 3VCABG 2013 (LIMA-LAD, SVG-OM1, SVG-RCA (recent PCI MATTHEW to SVG-OM1 2/2020, otherwise patent grafts) multiple PCIs (18 stents, most recent as stated prior @ [facility]), ?chronic systolic CHF (EF 55% by cath 2/2020), history of VT s/p ICD (with most recent interrogation 2 weeks ago with no events per pt) presented to [facility] ED 9/15/2020 c/o worsening exertional, left sided, non-radiating chest pressure with associated SOB upon 10 steps ambulation that is relieved by rest. Pt states this is similar to cp prior to previous cardiac catheterizations. In ED: Vitals: 97.6X-[id number] – 176/84 – 99% RA  EKG: NSR @ 61bpm. New TWI in  I and aVL, old TWI in V2-V5. No ST elevations/depressions.  Labs:. Trop neg x 1. Otherwise unremarkable  CXR: Official read not yet performed. Appears to be clear. Interventions: Nifedipine 30mg x 1 dose for /90. Pt admitted for cardiac cath.   cardiac catheterization (2/10/2020) revealing dLMCA 70%, pLAD 100%, pRamus 100%, pLCx 100%, mRCA 100%, LIMA-LAD patent, SVG-RPDA patent, SVG-OM1 99% s/p MATTHEW, L radial.    During hospitalization, COVID negative. s/p cardiac cath 9/15/20: unchanged from previous cath: dLM 70%. pLAD 60%. pRamus 100%. pLCx 100%. mRCA 100%. LIMA-LAD patent. SVG-OM1 patent stent. SVG-RPDA patnet. EF 55%. EDP 8. R groin PC. R groin stable, no bleeding or hematoma. Patient no longer c/o chest pressure or dyspnea.     On the day of discharge, the patient was seen and examined. Symptoms improved. Vital signs are stable. Labs and imaging reviewed. Patient is medically optimized and hemodynamically stable. Return precautions discussed, medication teach back done w/patient, and importance of physician followup emphasized for which she verbalized understanding.  69 y/o Female with PMH of CAD s/p 19 stents, NSTEMI in the setting of COVID managed medically during admission 9/15-9/16/2020 @[facility], 3VCABG in 2008 (LIMA-LAD, SVG-OM1, SVG-RCA) chronic angina, Hx of VT s/p ICD placement ([facility] Placed 2013, last interpreted 9/2020 with no events per pt), T2DM, HTN, HLD, and Hypothyroidism presents to [facility] ED c/o worsening angina x1 week. Pt states that for the past week she has been experiencing "chest tightness" described as squeezing on mild exertion (getting out of chair/bed, any ambulation) and that these episodes are usually alleviated with NTG, however this past week the chest tightness has been taking 20-30 min after NTG x1-2 to resolve. Pt's daughter reports she has seen the pt lean over clutching her chest and appearing unwell recently. Pt denies SOB, BROWN, palpitations, dizziness, LOC, N/V, diaphoresis, orthopnea/PND, and leg swelling.    EKG on arrival HR 62, /91, RR 18, SpO2 98% on RA, T 97.6F. Labs: HbA1c 7.0. Troponin T neg x1. Physical exam significant for _____________. At time of interview vitals: HR 56, /95, RR 18, SpO2 97% on RA, T 97.6F. Amlodipine 10mg PO x1 ordered. Pt started on Heparin ggt in ED and admitted to [facility] for further management of cardiac ischemia.    Of note, pt was recently admitted to [facility] from 9/15-9/16/2020 for COVID and was diagnosed with NSTEMI during admission, which was medically managed (no intervention).    Cardiac cath 9/15/2020: (unchanged from previous cath in 2/2020) dLM 70%. pLAD 60%. pRamus 100%. pLCx 100%. mRCA 100%. LIMA-LAD patent. SVG-OM1 patent stent. SVG-RPDA patnet. EF 55%. EDP 8.        _____________________  69 yo Simone and English speaking female with a PMHx of HTN, hyperlipidemia, DMII, hypothyroidism, known CAD with prior 3VCABG 2013 (LIMA-LAD, SVG-OM1, SVG-RCA (recent PCI MATTHEW to SVG-OM1 2/2020, otherwise patent grafts) multiple PCIs (18 stents, most recent as stated prior @ [facility]), ?chronic systolic CHF (EF 55% by cath 2/2020), history of VT s/p ICD (with most recent interrogation 2 weeks ago with no events per pt) presented to [facility] ED 9/15/2020 c/o worsening exertional, left sided, non-radiating chest pressure with associated SOB upon 10 steps ambulation that is relieved by rest. Pt states this is similar to cp prior to previous cardiac catheterizations. In ED: Vitals: [id number] – 176/84 – 99% RA  EKG: NSR @ 61bpm. New TWI in  I and aVL, old TWI in V2-V5. No ST elevations/depressions.  Labs:. Trop neg x 1. Otherwise unremarkable  CXR: Official read not yet performed. Appears to be clear. Interventions: Nifedipine 30mg x 1 dose for /90. Pt admitted for cardiac cath.   cardiac catheterization (2/10/2020) revealing dLMCA 70%, pLAD 100%, pRamus 100%, pLCx 100%, mRCA 100%, LIMA-LAD patent, SVG-RPDA patent, SVG-OM1 99% s/p MATTHEW, L radial.    During hospitalization, COVID negative. s/p cardiac cath 9/15/20: unchanged from previous cath: dLM 70%. pLAD 60%. pRamus 100%. pLCx 100%. mRCA 100%. LIMA-LAD patent. SVG-OM1 patent stent. SVG-RPDA patnet. EF 55%. EDP 8. R groin PC. R groin stable, no bleeding or hematoma. Patient no longer c/o chest pressure or dyspnea.     On the day of discharge, the patient was seen and examined. Symptoms improved. Vital signs are stable. Labs and imaging reviewed. Patient is medically optimized and hemodynamically stable. Return precautions discussed, medication teach back done w/patient, and importance of physician followup emphasized for which she verbalized understanding.  69 y/o Female with PMH of CAD s/p 19 stents, NSTEMI in the setting of COVID managed medically during admission 9/15-9/16/2020 @[facility], 3VCABG in 2008 (LIMA-LAD, SVG-OM1, SVG-RCA) chronic angina, Hx of VT s/p ICD placement ([facility] Placed 2013, last interpreted 9/2020 with no events per pt), T2DM, HTN, HLD, and Hypothyroidism presents to [facility] ED c/o worsening angina x1 week. Pt states that for the past week she has been experiencing "chest tightness" described as squeezing on mild exertion (getting out of chair/bed, any ambulation) and that these episodes are usually alleviated with NTG, however this past week the chest tightness has been taking 20-30 min after NTG x1-2 to resolve. Pt's daughter reports she has seen the pt lean over clutching her chest and appearing unwell recently. Pt denies SOB, BROWN, palpitations, dizziness, LOC, N/V, diaphoresis, orthopnea/PND, and leg swelling.    EKG on arrival HR 62, /91, RR 18, SpO2 98% on RA, T 97.6F. Labs: HbA1c 7.0. Troponin T neg x1, COVID swab pending (low suspicion). Physical exam significant for _____________. At time of interview vitals: HR 56, /95, RR 18, SpO2 97% on RA, T 97.6F. Amlodipine 10mg PO x1 ordered. Pt started on Heparin ggt in ED and admitted to [facility] for further management of cardiac ischemia.    Of note, pt was recently admitted to [facility] from 9/15-9/16/2020 for COVID and was diagnosed with NSTEMI during admission and underwent cardiac cath. Cardiac cath findings unchanged from previous on 2/10/2020 and pt was medically managed (no intervention).  Cardiac cath 9/15/2020: (unchanged from previous cath in 2/2020) dLM 70%. pLAD 60%. pRamus 100%. pLCx 100%. mRCA 100%. LIMA-LAD patent. SVG-OM1 patent stent. SVG-RPDA patnet. EF 55%. EDP 8. 71 y/o Female with PMH of CAD s/p 19 stents, NSTEMI in the setting of COVID managed medically during admission 9/15-9/16/2020 @[facility], 3VCABG in 2008 (LIMA-LAD, SVG-OM1, SVG-RCA) chronic angina, Hx of VT s/p ICD placement ([facility] Placed 2013, last interpreted 9/2020 with no events per pt), T2DM, HTN, HLD, and Hypothyroidism presents to [facility] ED c/o worsening angina x1 week. Pt states that for the past week she has been experiencing "chest tightness" described as squeezing on mild exertion (getting out of chair/bed, any ambulation) and that these episodes are usually alleviated with NTG, however this past week the chest tightness has been taking 20-30 min after NTG x1-2 to resolve. Pt's daughter reports she has seen the pt lean over clutching her chest and appearing unwell recently. Pt denies SOB, BROWN, palpitations, dizziness, LOC, N/V, diaphoresis, orthopnea/PND, and leg swelling.    EKG on arrival HR 62, /91, RR 18, SpO2 98% on RA, T 97.6F. Labs: HbA1c 7.0. Troponin T neg x1, COVID swab pending (low suspicion). Physical exam significant for _____________. At time of interview vitals: HR 56, /95, RR 18, SpO2 97% on RA, T 97.6F. Amlodipine 10mg PO x1 ordered. Pt started on Heparin ggt in ED and admitted to [facility] for further management of cardiac ischemia. Pt reports good compliance with Aspirin 81mg x1 daily and Plavix 75mg x1 daily, and states that her last dose of both was the morning of 12/29/2020.    Of note, pt was recently admitted to [facility] from 9/15-9/16/2020 for COVID and was diagnosed with NSTEMI during admission and underwent cardiac cath. Cardiac cath findings unchanged from previous on 2/10/2020 and pt was medically managed (no intervention).  Cardiac cath 9/15/2020: (unchanged from previous cath in 2/2020) dLM 70%. pLAD 60%. pRamus 100%. pLCx 100%. mRCA 100%. LIMA-LAD patent. SVG-OM1 patent stent. SVG-RPDA patnet. EF 55%. EDP 8. 71 y/o Female with PMH of CAD s/p 19 stents, NSTEMI in the setting of COVID managed medically during admission 9/15-9/16/2020 @[facility], 3VCABG in 2008 (LIMA-LAD, SVG-OM1, SVG-RCA) chronic angina, Hx of VT s/p ICD placement ([facility] Placed 2013, last interpreted 9/2020 with no events per pt), T2DM, HTN, HLD, and Hypothyroidism presents to [facility] ED c/o worsening angina x1 week. Pt states that for the past week she has been experiencing "chest tightness" described as squeezing on mild exertion (getting out of chair/bed, any ambulation) and that these episodes are usually alleviated with NTG, however this past week the chest tightness has been taking 20-30 min after NTG x1-2 to resolve. Pt's daughter reports she has seen the pt lean over clutching her chest and appearing unwell recently. Pt denies SOB, BROWN, palpitations, dizziness, LOC, N/V, diaphoresis, orthopnea/PND, and leg swelling.    EKG on arrival: NSR @58 w/ LBBB (new from prior EKG on 9/16/2020). Vitals on arrival: HR 62, /91, RR 18, SpO2 98% on RA, T 97.6F. Labs: HbA1c 7.0. Troponin T neg x1, COVID swab pending (low suspicion). Physical exam significant for _____________. At time of interview vitals: HR 56, /95, RR 18, SpO2 97% on RA, T 97.6F. Amlodipine 10mg PO x1 ordered. Pt started on Heparin ggt in ED and admitted to [facility] for further management of cardiac ischemia. Pt reports good compliance with Aspirin 81mg x1 daily and Plavix 75mg x1 daily, and states that her last dose of both was the morning of 12/29/2020.    Of note, pt was recently admitted to [facility] from 9/15-9/16/2020 for COVID and was diagnosed with NSTEMI during admission and underwent cardiac cath. Cardiac cath findings unchanged from previous on 2/10/2020 and pt was medically managed (no intervention).  Cardiac cath 9/15/2020: (unchanged from previous cath in 2/2020) dLM 70%. pLAD 60%. pRamus 100%. pLCx 100%. mRCA 100%. LIMA-LAD patent. SVG-OM1 patent stent. SVG-RPDA patnet. EF 55%. EDP 8. History and consent obtained with help of daughter Roxanne translating [phone]    69 y/o Female with PMH of CAD s/p 19 stents, NSTEMI in the setting of COVID managed medically during admission 9/15-9/16/2020 @[facility], 3VCABG in 2008 (LIMA-LAD, SVG-OM1, SVG-RCA) chronic angina, Hx of VT s/p ICD placement ([facility] Placed 2013, last interpreted 9/2020 with no events per pt), T2DM, HTN, HLD, and Hypothyroidism presents to [facility] ED c/o worsening angina x1 week. Pt states that for the past week she has been experiencing "chest tightness" described as squeezing on mild exertion (getting out of chair/bed, any ambulation) and that these episodes are usually alleviated with NTG, however this past week the chest tightness has been taking 20-30 min after NTG x1-2 to resolve. Pt's daughter reports she has seen the pt lean over clutching her chest and appearing unwell recently. Pt denies SOB, BROWN, palpitations, dizziness, LOC, N/V, diaphoresis, orthopnea/PND, and leg swelling.    EKG on arrival: NSR @58 w/ LBBB (new from prior EKG on 9/16/2020). Vitals on arrival: HR 62, /91, RR 18, SpO2 98% on RA, T 97.6F. Labs: HbA1c 7.0. Troponin T neg x1, COVID swab pending (low suspicion). Physical exam significant for PT pulses dopplerable b/l. At time of interview vitals: HR 56, /95, RR 18, SpO2 97% on RA, T 97.6F. Amlodipine 10mg PO x1 ordered. Pt started on Heparin ggt in ED and admitted to [facility] for further management of cardiac ischemia. Pt reports good compliance with Aspirin 81mg x1 daily and Plavix 75mg x1 daily, and states that her last dose of both was the morning of 12/29/2020.    Of note, pt was recently admitted to [facility] from 9/15-9/16/2020 for COVID and was diagnosed with NSTEMI during admission and underwent cardiac cath. Cardiac cath findings unchanged from previous on 2/10/2020 and pt was medically managed (no intervention).  Cardiac cath 9/15/2020: (unchanged from previous cath in 2/10/2020) dLM 70%. pLAD 60%. pRamus 100%. pLCx 100%. mRCA 100%. LIMA-LAD patent. SVG-OM1 patent stent. SVG-RPDA patnet. EF 55%. EDP 8. History and consent obtained with help of daughter Roxanne translating [phone]    69 y/o Female with PMH of CAD s/p 19 stents, NSTEMI in the setting of COVID managed medically during admission 9/15-9/16/2020 @[facility], 3VCABG in 2008 (LIMA-LAD, SVG-OM1, SVG-RCA) chronic angina, Hx of VT s/p ICD placement ([facility] Placed 2013, last interpreted 9/2020 with no events per pt), T2DM, HTN, HLD, moderate-severe TR on last echo 2/2020, and Hypothyroidism presents to [facility] ED c/o worsening angina x1 week. Pt states that for the past week she has been experiencing "chest tightness" described as squeezing on mild exertion (getting out of chair/bed, any ambulation) and that these episodes are usually alleviated with NTG, however this past week the chest tightness has been taking 20-30 min after NTG x1-2 to resolve. Pt's daughter reports she has seen the pt lean over clutching her chest and appearing unwell recently. Pt denies SOB, BROWN, palpitations, dizziness, LOC, N/V, diaphoresis, orthopnea/PND, and leg swelling.    EKG on arrival: NSR @58 w/ LBBB (new from prior EKG on 9/16/2020). Vitals on arrival: HR 62, /91, RR 18, SpO2 98% on RA, T 97.6F. Labs: HbA1c 7.0. Troponin T neg x1, COVID swab pending (low suspicion). Physical exam significant for PT pulses dopplerable b/l. At time of interview vitals: HR 56, /95, RR 18, SpO2 97% on RA, T 97.6F. Amlodipine 10mg PO x1 ordered. Pt started on Heparin ggt in ED and admitted to [facility] for further management of cardiac ischemia. Pt reports good compliance with Aspirin 81mg x1 daily and Plavix 75mg x1 daily, and states that her last dose of both was the morning of 12/29/2020.    Of note, pt was recently admitted to [facility] from 9/15-9/16/2020 for COVID and was diagnosed with NSTEMI during admission and underwent cardiac cath. Cardiac cath findings unchanged from previous on 2/10/2020 and pt was medically managed (no intervention).  Cardiac cath 9/15/2020: (unchanged from previous cath in 2/10/2020) dLM 70%. pLAD 60%. pRamus 100%. pLCx 100%. mRCA 100%. LIMA-LAD patent. SVG-OM1 patent stent. SVG-RPDA patnet. EF 55%. EDP 8.  Last Echo 2/11/2020: Normal LV size and systolic function, mildly dilated right ventricle, normal RV systolic function, moderate-to-severe tricuspid regurgitation, pulmonary hypertension present, PASP 42, no pericardial effusion, right heart device leads.

## 2020-12-29 NOTE — H&P ADULT - PROBLEM SELECTOR PLAN 4
-On Atorvastatin 40mg QD at home  -Lipid panel ordered for AM, f/u -On Atorvastatin 40mg QD at home  -Last LDL 74 on 2/9/2020  -Lipid panel ordered for AM, f/u -On Farxiga 5mg QD and Metformin ER 500mg QD at home  -HbA1c 7.0 12/29/2020  -Sliding scale insulin ordered  -Hold oral hypoglycemic meds

## 2020-12-29 NOTE — ED PROVIDER NOTE - PMH
CAD in native artery    DM2 (diabetes mellitus, type 2)    H/O: HTN (hypertension)    Hypothyroidism    ICD (implantable cardioverter-defibrillator) in place

## 2020-12-29 NOTE — H&P ADULT - PROBLEM SELECTOR PLAN 6
-On Levothyroxine 25mcg QD  -TSH 3.145 WNL  -Continue home regimen    DVT ppx: Heparin ggt, pt pending cardiac cath later today (12/29)  Dispo: Pending cardiac cath

## 2020-12-29 NOTE — ED ADULT NURSE NOTE - OBJECTIVE STATEMENT
Pt presents c/o intermittent CP/SOB. Reports taking nitro more often at home. Pt denies cp at this time, denies dizziness/weakness. Pt aaox3 in NAD, breathing even and unlabored, currently on monitor.

## 2020-12-29 NOTE — ED PROVIDER NOTE - CLINICAL SUMMARY MEDICAL DECISION MAKING FREE TEXT BOX
69 y/o F presents to the ED w/ worsening episodes of chest pain, concerning for possible MI vs unstable angina. EKG shows new LBBB. Negative thrombosis criteria. No active pain currently. Concern pt needs urgent catheterization. As pt is in no active pain and no SOB, did not call cath lab code; however did alert Dr. Bliss as well as CCU fellow, who will evaluate pt. 69 y/o F presents to the ED w/ worsening episodes of chest pain, concerning for possible MI vs unstable angina. EKG shows new LBBB. Negative Sgarbossa criteria. No active pain currently. Concern pt needs urgent catheterization. As pt is in no active pain and no SOB, did not call cath lab code; however did alert Dr. Bliss as well as CCU fellow, who will evaluate pt.

## 2020-12-29 NOTE — ED PROVIDER NOTE - OBJECTIVE STATEMENT
71 y/o F with PMHx CAD, w/ 19 stents, triple bypass (2008), chronic angina, ICD placement, DM, HTN, and HLD, presents to the ED c/o 1 wek of worsening angina episodes. Pt states these are generally resolved with oral nitroglycerin, however this week her episodes have not been relieved after taking ntg x 2 and last up to half an hour. Per pt's daughter, she has seen her mother leaning over clutching her chest and appearing unwell. Denies cough; reports mild SOB. 69 y/o F with PMHx CAD, w/ 19 stents, triple bypass (2008), chronic angina, ICD placement, DM, HTN, and HLD, presents to the ED c/o 1 week of worsening episodes of angina. Pt states these are generally resolved with oral nitroglycerin, however this week her episodes have not been relieved after taking ntg x 2 and last up to half an hour. Per pt's daughter, she has seen her mother leaning over clutching her chest and appearing unwell. Denies cough; reports mild SOB.

## 2020-12-29 NOTE — H&P ADULT - PROBLEM SELECTOR PLAN 2
-[facility], placed in 2013 for VT  -Last interpreted in 9/2020 with no events per pt  -EP consulted, f/u recs -[facility], placed in 2013 for VT  -ICD last interpreted in 8/2020 showing no events. Managed by Dr. Rosales @[facility] -On Amlodipine 10mg QD, Enalapril 10mg QD, isosorbide mononitrate ER 60mg QD, and Metoprolol Succinate ER 100mg QD at home and reports good compliance  -/91 on arrival, huy to 215/95 during interview. Amlodipine 10mg x1 ordered with good effect w/ SBP now 160s  -Continue home meds but hold Enalapril for cath later today 12/29  -Goal: SBP <160 -On Amlodipine 10mg QD, Enalapril 10mg QD, isosorbide mononitrate ER 60mg QD, and Metoprolol Succinate ER 100mg QD at home and reports good compliance  -/91 on arrival, huy to 215/95 during interview. Amlodipine 10mg x1 ordered with good effect w/ SBP now 160s  -Continue home meds but hold Enalapril for cath 12/30  -Goal: SBP <160

## 2020-12-29 NOTE — H&P ADULT - PROBLEM SELECTOR PLAN 3
-On Amlodipine 10mg QD, Enalapril 10mg QD, isosorbide mononitrate ER 60mg QD, and Metoprolol Succinate ER 100mg QD and reports good compliance  -/91 on arrival, huy to 215/95 during interview. Amlodipine 10mg x1 ordered with good effect  -Continue home meds but hold Enalapril for cath later today 12/29  -Goal: SBP <160 -On Atorvastatin 40mg QD at home  -Last LDL 74 on 2/9/2020  -Lipid panel ordered for AM, f/u

## 2020-12-30 DIAGNOSIS — I47.2 VENTRICULAR TACHYCARDIA: ICD-10-CM

## 2020-12-30 LAB
ANION GAP SERPL CALC-SCNC: 12 MMOL/L — SIGNIFICANT CHANGE UP (ref 5–17)
APTT BLD: 69.9 SEC — HIGH (ref 27.5–35.5)
BUN SERPL-MCNC: 15 MG/DL — SIGNIFICANT CHANGE UP (ref 7–23)
CALCIUM SERPL-MCNC: 8.7 MG/DL — SIGNIFICANT CHANGE UP (ref 8.4–10.5)
CHLORIDE SERPL-SCNC: 105 MMOL/L — SIGNIFICANT CHANGE UP (ref 96–108)
CHOLEST SERPL-MCNC: 163 MG/DL — SIGNIFICANT CHANGE UP
CK MB CFR SERPL CALC: 1.3 NG/ML — SIGNIFICANT CHANGE UP (ref 0–6.7)
CO2 SERPL-SCNC: 25 MMOL/L — SIGNIFICANT CHANGE UP (ref 22–31)
CREAT SERPL-MCNC: 0.69 MG/DL — SIGNIFICANT CHANGE UP (ref 0.5–1.3)
GLUCOSE SERPL-MCNC: 146 MG/DL — HIGH (ref 70–99)
HCT VFR BLD CALC: 42.3 % — SIGNIFICANT CHANGE UP (ref 34.5–45)
HDLC SERPL-MCNC: 72 MG/DL — SIGNIFICANT CHANGE UP
HGB BLD-MCNC: 13.4 G/DL — SIGNIFICANT CHANGE UP (ref 11.5–15.5)
LIPID PNL WITH DIRECT LDL SERPL: 74 MG/DL — SIGNIFICANT CHANGE UP
MCHC RBC-ENTMCNC: 28.3 PG — SIGNIFICANT CHANGE UP (ref 27–34)
MCHC RBC-ENTMCNC: 31.7 GM/DL — LOW (ref 32–36)
MCV RBC AUTO: 89.4 FL — SIGNIFICANT CHANGE UP (ref 80–100)
NON HDL CHOLESTEROL: 91 MG/DL — SIGNIFICANT CHANGE UP
NRBC # BLD: 0 /100 WBCS — SIGNIFICANT CHANGE UP (ref 0–0)
PLATELET # BLD AUTO: 183 K/UL — SIGNIFICANT CHANGE UP (ref 150–400)
POTASSIUM SERPL-MCNC: 4.3 MMOL/L — SIGNIFICANT CHANGE UP (ref 3.5–5.3)
POTASSIUM SERPL-SCNC: 4.3 MMOL/L — SIGNIFICANT CHANGE UP (ref 3.5–5.3)
RBC # BLD: 4.73 M/UL — SIGNIFICANT CHANGE UP (ref 3.8–5.2)
RBC # FLD: 13.9 % — SIGNIFICANT CHANGE UP (ref 10.3–14.5)
SODIUM SERPL-SCNC: 142 MMOL/L — SIGNIFICANT CHANGE UP (ref 135–145)
TRIGL SERPL-MCNC: 87 MG/DL — SIGNIFICANT CHANGE UP
TROPONIN T SERPL-MCNC: <0.01 NG/ML — SIGNIFICANT CHANGE UP (ref 0–0.01)
WBC # BLD: 6.38 K/UL — SIGNIFICANT CHANGE UP (ref 3.8–10.5)
WBC # FLD AUTO: 6.38 K/UL — SIGNIFICANT CHANGE UP (ref 3.8–10.5)

## 2020-12-30 PROCEDURE — 93459 L HRT ART/GRFT ANGIO: CPT | Mod: 26

## 2020-12-30 PROCEDURE — 93010 ELECTROCARDIOGRAM REPORT: CPT

## 2020-12-30 PROCEDURE — 99222 1ST HOSP IP/OBS MODERATE 55: CPT

## 2020-12-30 RX ORDER — HEPARIN SODIUM 5000 [USP'U]/ML
6000 INJECTION INTRAVENOUS; SUBCUTANEOUS EVERY 6 HOURS
Refills: 0 | Status: DISCONTINUED | OUTPATIENT
Start: 2020-12-30 | End: 2020-12-31

## 2020-12-30 RX ORDER — SODIUM CHLORIDE 9 MG/ML
500 INJECTION INTRAMUSCULAR; INTRAVENOUS; SUBCUTANEOUS
Refills: 0 | Status: DISCONTINUED | OUTPATIENT
Start: 2020-12-30 | End: 2020-12-31

## 2020-12-30 RX ORDER — ISOSORBIDE MONONITRATE 60 MG/1
30 TABLET, EXTENDED RELEASE ORAL ONCE
Refills: 0 | Status: COMPLETED | OUTPATIENT
Start: 2020-12-30 | End: 2020-12-30

## 2020-12-30 RX ORDER — DEXTROSE 50 % IN WATER 50 %
25 SYRINGE (ML) INTRAVENOUS ONCE
Refills: 0 | Status: DISCONTINUED | OUTPATIENT
Start: 2020-12-30 | End: 2020-12-31

## 2020-12-30 RX ORDER — HEPARIN SODIUM 5000 [USP'U]/ML
700 INJECTION INTRAVENOUS; SUBCUTANEOUS
Qty: 25000 | Refills: 0 | Status: DISCONTINUED | OUTPATIENT
Start: 2020-12-30 | End: 2020-12-31

## 2020-12-30 RX ORDER — INSULIN LISPRO 100/ML
VIAL (ML) SUBCUTANEOUS
Refills: 0 | Status: DISCONTINUED | OUTPATIENT
Start: 2020-12-30 | End: 2020-12-31

## 2020-12-30 RX ORDER — ISOSORBIDE MONONITRATE 60 MG/1
30 TABLET, EXTENDED RELEASE ORAL DAILY
Refills: 0 | Status: DISCONTINUED | OUTPATIENT
Start: 2020-12-30 | End: 2020-12-30

## 2020-12-30 RX ORDER — SODIUM CHLORIDE 9 MG/ML
1000 INJECTION, SOLUTION INTRAVENOUS
Refills: 0 | Status: DISCONTINUED | OUTPATIENT
Start: 2020-12-30 | End: 2020-12-31

## 2020-12-30 RX ORDER — DEXTROSE 50 % IN WATER 50 %
15 SYRINGE (ML) INTRAVENOUS ONCE
Refills: 0 | Status: DISCONTINUED | OUTPATIENT
Start: 2020-12-30 | End: 2020-12-31

## 2020-12-30 RX ORDER — GLUCAGON INJECTION, SOLUTION 0.5 MG/.1ML
1 INJECTION, SOLUTION SUBCUTANEOUS ONCE
Refills: 0 | Status: DISCONTINUED | OUTPATIENT
Start: 2020-12-30 | End: 2020-12-31

## 2020-12-30 RX ORDER — ATORVASTATIN CALCIUM 80 MG/1
80 TABLET, FILM COATED ORAL AT BEDTIME
Refills: 0 | Status: DISCONTINUED | OUTPATIENT
Start: 2020-12-30 | End: 2020-12-31

## 2020-12-30 RX ORDER — HYDRALAZINE HCL 50 MG
25 TABLET ORAL ONCE
Refills: 0 | Status: COMPLETED | OUTPATIENT
Start: 2020-12-30 | End: 2020-12-30

## 2020-12-30 RX ORDER — DEXTROSE 50 % IN WATER 50 %
12.5 SYRINGE (ML) INTRAVENOUS ONCE
Refills: 0 | Status: DISCONTINUED | OUTPATIENT
Start: 2020-12-30 | End: 2020-12-31

## 2020-12-30 RX ORDER — ISOSORBIDE MONONITRATE 60 MG/1
90 TABLET, EXTENDED RELEASE ORAL DAILY
Refills: 0 | Status: DISCONTINUED | OUTPATIENT
Start: 2020-12-31 | End: 2020-12-31

## 2020-12-30 RX ORDER — INSULIN LISPRO 100/ML
2 VIAL (ML) SUBCUTANEOUS ONCE
Refills: 0 | Status: COMPLETED | OUTPATIENT
Start: 2020-12-30 | End: 2020-12-30

## 2020-12-30 RX ADMIN — Medication 25 MICROGRAM(S): at 06:25

## 2020-12-30 RX ADMIN — Medication 100 MILLIGRAM(S): at 06:25

## 2020-12-30 RX ADMIN — ISOSORBIDE MONONITRATE 60 MILLIGRAM(S): 60 TABLET, EXTENDED RELEASE ORAL at 11:39

## 2020-12-30 RX ADMIN — Medication 2 UNIT(S): at 22:36

## 2020-12-30 RX ADMIN — Medication 81 MILLIGRAM(S): at 06:25

## 2020-12-30 RX ADMIN — AMLODIPINE BESYLATE 10 MILLIGRAM(S): 2.5 TABLET ORAL at 06:26

## 2020-12-30 RX ADMIN — HEPARIN SODIUM 700 UNIT(S)/HR: 5000 INJECTION INTRAVENOUS; SUBCUTANEOUS at 00:41

## 2020-12-30 RX ADMIN — ISOSORBIDE MONONITRATE 30 MILLIGRAM(S): 60 TABLET, EXTENDED RELEASE ORAL at 13:53

## 2020-12-30 RX ADMIN — SODIUM CHLORIDE 75 MILLILITER(S): 9 INJECTION INTRAMUSCULAR; INTRAVENOUS; SUBCUTANEOUS at 08:55

## 2020-12-30 RX ADMIN — ATORVASTATIN CALCIUM 80 MILLIGRAM(S): 80 TABLET, FILM COATED ORAL at 22:10

## 2020-12-30 RX ADMIN — Medication 25 MILLIGRAM(S): at 22:36

## 2020-12-30 RX ADMIN — CLOPIDOGREL BISULFATE 75 MILLIGRAM(S): 75 TABLET, FILM COATED ORAL at 06:27

## 2020-12-30 NOTE — PROGRESS NOTE ADULT - PROBLEM SELECTOR PLAN 1
-Outpatient cardiologist: Dr. Pritesh Moss [phone]  -Troponin negative x2. Patient did have chest pain overnight requiring SL NTG x one dose with relief.  Currently chest pain free this AM.   -s/p 3VCABG 2013 (LIMA-LAD, SVG-OM1, SVG-RCA (all patent by angiogram 2013), multiple PCIs. Most recent record is from 2/2013 @ [facility] s/p PCI proximal Ramus (Called [facility] for records as well, put in chart).   -Plan for cardiac catheterization with possible intervention if clinically indicated. Precath/consented. Aspirin/Plavix loaded 2/9/2020.   -Echo ordered  -Continue Aspirin 81 mg daily, Plavix 75 mg daily, Lipitor 80 mg daily, Imdur 30 mg daily, Lopressor 50 mg orally BID, Procardia XL 60 mg daily. -s/p 3VCABG 2013 (LIMA-LAD, SVG-OM1, SVG-RCA (all patent by angiogram 2013), multiple PCIs. Most recent record is from 2/2013 @ [facility] s/p PCI proximal Ramus  -Cardiac cath 12/30 w/ Dr. Perry  -Echo ordered  -Continue Aspirin 81 mg daily, Plavix 75 mg daily, Lipitor 80 mg daily, Imdur 30 mg daily, Lopressor 50 mg orally BID, Procardia XL 60 mg daily. -s/p 3VCABG 2013 (LIMA-LAD, SVG-OM1, SVG-RCA (all patent by angiogram 2013), multiple PCIs. Most recent record is from 2/2013 @ [facility] s/p PCI proximal Ramus  -Cardiac cath 12/30 w/ Dr. Perry  -Echo ordered  -Continue Aspirin 81 mg daily, Plavix 75 mg daily, Lipitor 80 mg daily, Imdur 90 mg daily, Lopressor 50 mg orally BID, Procardia XL 60 mg daily. -s/p 3VCABG 2013 (LIMA-LAD, SVG-OM1, SVG-RCA (all patent by angiogram 2013), multiple PCIs. Most recent record is from 2/2013 @ [facility] s/p PCI proximal Ramus  -Cardiac cath 12/30: diagnostic, unchanged from prior. R groin manually compressed  -Echo ordered; f/u results  -Continue Aspirin 81 mg daily, Plavix 75 mg daily, Lipitor 80 mg daily, Imdur 90 mg daily, Lopressor 50 mg orally BID, Procardia XL 60 mg daily.

## 2020-12-30 NOTE — PROGRESS NOTE ADULT - PROBLEM SELECTOR PLAN 2
-SBP this AM 180s mmHG  -Continue Imdur 30 mg daily, Lopressor 50 mg orally BID, Procardia XL 60 mg daily  -Holding home Enalapril 10 mg daily in the setting of upcoming dye load   -All home meds given this AM; will up titrate Imdur if SBP remains elevated. -SBP this AM 180s mmHG  -Home Imdur 60 mg daily increased to 90mg QD  -Continue home Lopressor 50 mg orally BID, Amlodipine 10mg QD  -Holding home Enalapril 10 mg daily in the setting of upcoming dye load

## 2020-12-30 NOTE — PROGRESS NOTE ADULT - SUBJECTIVE AND OBJECTIVE BOX
Interventional Cardiology PA Adult Progress Note    Subjective Assessment:  	  MEDICATIONS:  amLODIPine   Tablet 10 milliGRAM(s) Oral daily  isosorbide   mononitrate ER Tablet (IMDUR) 60 milliGRAM(s) Oral daily  metoprolol succinate  milliGRAM(s) Oral daily            atorvastatin 40 milliGRAM(s) Oral at bedtime  levothyroxine 25 MICROGram(s) Oral daily    aspirin  chewable 81 milliGRAM(s) Oral daily  clopidogrel Tablet 75 milliGRAM(s) Oral daily  heparin   Injectable 6000 Unit(s) IV Push every 6 hours PRN  heparin  Infusion. 700 Unit(s)/Hr IV Continuous <Continuous>  sodium chloride 0.9%. 500 milliLiter(s) IV Continuous <Continuous>      	    [PHYSICAL EXAM:  TELEMETRY:  T(C): 36.3 (12-30-20 @ 10:00), Max: 36.8 (12-29-20 @ 21:20)  HR: 58 (12-30-20 @ 09:04) (55 - 68)  BP: 163/72 (12-30-20 @ 09:04) (146/65 - 215/95)  RR: 18 (12-30-20 @ 09:04) (18 - 20)  SpO2: 98% (12-30-20 @ 09:04) (95% - 98%)  Wt(kg): --  I&O's Summary    29 Dec 2020 07:01  -  30 Dec 2020 07:00  --------------------------------------------------------  IN: 69 mL / OUT: 1050 mL / NET: -981 mL    30 Dec 2020 07:01  -  30 Dec 2020 11:26  --------------------------------------------------------  IN: 180 mL / OUT: 0 mL / NET: 180 mL      Height (cm): 157.5 (12-29 @ 22:41)  Weight (kg): 104.9 (12-29 @ 22:41)  BMI (kg/m2): 42.3 (12-29 @ 22:41)  BSA (m2): 2.03 (12-29 @ 22:41)  Rodriguez:  Central/PICC/Mid Line:                                         Appearance: Normal	  HEENT:   Normal oral mucosa, PERRL, EOMI	  Neck: Supple, + JVD/ - JVD; Carotid Bruit   Cardiovascular: Normal S1 S2, No JVD, No murmurs,   Respiratory: Lungs clear to auscultation/Decreased Breath Sounds/No Rales, Rhonchi, Wheezing	  Gastrointestinal:  Soft, Non-tender, + BS	  Skin: No rashes, No ecchymoses, No cyanosis  Extremities: Normal range of motion, No clubbing, cyanosis or edema  Vascular: Peripheral pulses palpable 2+ bilaterally  Neurologic: Non-focal  Psychiatry: A & O x 3, Mood & affect appropriate      	    ECG:  	  RADIOLOGY:   DIAGNOSTIC TESTING:  [ ] Echocardiogram:  [ ]  Catheterization:  [ ] Stress Test:    [ ] JENNY  OTHER: 	    LABS:	 	  CARDIAC MARKERS:                                  13.4   6.38  )-----------( 183      ( 30 Dec 2020 07:34 )             42.3     12-30    142  |  105  |  15  ----------------------------<  146<H>  4.3   |  25  |  0.69    Ca    8.7      30 Dec 2020 07:35  Mg     2.3     12-29    TPro  7.3  /  Alb  3.9  /  TBili  0.3  /  DBili  x   /  AST  28  /  ALT  28  /  AlkPhos  81  12-29    proBNP:   Lipid Profile:   HgA1c:   TSH: Thyroid Stimulating Hormone, Serum: 3.145 uIU/mL (12-29 @ 11:34)    PT/INR - ( 29 Dec 2020 14:20 )   PT: 11.2 sec;   INR: 0.93          PTT - ( 30 Dec 2020 07:33 )  PTT:69.9 sec    ASSESSMENT/PLAN: 	        DVT ppx:  Dispo:     Interventional Cardiology PA Adult Progress Note    Subjective Assessment: Patient seen and examined at the bedside. No CP/SOB at this time. Aware of plan for cardiac cath today with Dr. Perry - daughter aware as well. All other ROS negative excpet those listed in subjective assessment.  	  MEDICATIONS:  amLODIPine   Tablet 10 milliGRAM(s) Oral daily  isosorbide   mononitrate ER Tablet (IMDUR) 90 milliGRAM(s) Oral daily  metoprolol succinate  milliGRAM(s) Oral daily  atorvastatin 80 milliGRAM(s) Oral at bedtime  levothyroxine 25 MICROGram(s) Oral daily  aspirin  chewable 81 milliGRAM(s) Oral daily  clopidogrel Tablet 75 milliGRAM(s) Oral daily  heparin   Injectable 6000 Unit(s) IV Push every 6 hours PRN  heparin  Infusion. 700 Unit(s)/Hr IV Continuous <Continuous>  sodium chloride 0.9%. 500 milliLiter(s) IV Continuous <Continuous>  	    [PHYSICAL EXAM:  TELEMETRY:  T(C): 36.3 (12-30-20 @ 10:00), Max: 36.8 (12-29-20 @ 21:20)  HR: 58 (12-30-20 @ 09:04) (55 - 68)  BP: 163/72 (12-30-20 @ 09:04) (146/65 - 215/95)  RR: 18 (12-30-20 @ 09:04) (18 - 20)  SpO2: 98% (12-30-20 @ 09:04) (95% - 98%)  I&O's Summary    29 Dec 2020 07:01  -  30 Dec 2020 07:00  --------------------------------------------------------  IN: 69 mL / OUT: 1050 mL / NET: -981 mL    30 Dec 2020 07:01  -  30 Dec 2020 11:26  --------------------------------------------------------  IN: 180 mL / OUT: 0 mL / NET: 180 mL      Height (cm): 157.5 (12-29 @ 22:41)  Weight (kg): 104.9 (12-29 @ 22:41)  BMI (kg/m2): 42.3 (12-29 @ 22:41)  BSA (m2): 2.03 (12-29 @ 22:41)                                     Appearance: Normal	  Neck: Supple, - JVD; no Carotid Bruit   Cardiovascular: Normal S1 S2, No murmurs, rubs, gallops  Respiratory: Lungs clear to auscultation/No Rales, Rhonchi, Wheezing	  Gastrointestinal:  Soft, Non-tender, + BS	  Skin: No rashes, No ecchymoses, No cyanosis  Extremities: Normal range of motion, No clubbing, cyanosis or edema  Vascular: Peripheral pulses palpable 2+ bilaterally  Neurologic: Non-focal  Psychiatry: A & O x 3, Mood & affect appropriate      	    ECG:  	  RADIOLOGY:   DIAGNOSTIC TESTING:  [ ] Echocardiogram:  [ ]  Catheterization:  [ ] Stress Test:    [ ] JENNY  OTHER: 	    LABS:	 	  CARDIAC MARKERS:                                  13.4   6.38  )-----------( 183      ( 30 Dec 2020 07:34 )             42.3     12-30    142  |  105  |  15  ----------------------------<  146<H>  4.3   |  25  |  0.69    Ca    8.7      30 Dec 2020 07:35  Mg     2.3     12-29    TPro  7.3  /  Alb  3.9  /  TBili  0.3  /  DBili  x   /  AST  28  /  ALT  28  /  AlkPhos  81  12-29    proBNP:   Lipid Profile:   HgA1c:   TSH: Thyroid Stimulating Hormone, Serum: 3.145 uIU/mL (12-29 @ 11:34)    PT/INR - ( 29 Dec 2020 14:20 )   PT: 11.2 sec;   INR: 0.93          PTT - ( 30 Dec 2020 07:33 )  PTT:69.9 sec    ASSESSMENT/PLAN: 	        DVT ppx:  Dispo:     Interventional Cardiology PA Adult Progress Note    Subjective Assessment: Patient seen and examined at the bedside. No CP/SOB at this time. Aware of plan for cardiac cath today with Dr. Perry - daughter aware as well. All other ROS negative excpet those listed in subjective assessment.  	  MEDICATIONS:  amLODIPine   Tablet 10 milliGRAM(s) Oral daily  isosorbide   mononitrate ER Tablet (IMDUR) 90 milliGRAM(s) Oral daily  metoprolol succinate  milliGRAM(s) Oral daily  atorvastatin 80 milliGRAM(s) Oral at bedtime  levothyroxine 25 MICROGram(s) Oral daily  aspirin  chewable 81 milliGRAM(s) Oral daily  clopidogrel Tablet 75 milliGRAM(s) Oral daily  heparin   Injectable 6000 Unit(s) IV Push every 6 hours PRN  heparin  Infusion. 700 Unit(s)/Hr IV Continuous <Continuous>  sodium chloride 0.9%. 500 milliLiter(s) IV Continuous <Continuous>  	    [PHYSICAL EXAM:  TELEMETRY:  T(C): 36.3 (12-30-20 @ 10:00), Max: 36.8 (12-29-20 @ 21:20)  HR: 58 (12-30-20 @ 09:04) (55 - 68)  BP: 163/72 (12-30-20 @ 09:04) (146/65 - 215/95)  RR: 18 (12-30-20 @ 09:04) (18 - 20)  SpO2: 98% (12-30-20 @ 09:04) (95% - 98%)  I&O's Summary    29 Dec 2020 07:01  -  30 Dec 2020 07:00  --------------------------------------------------------  IN: 69 mL / OUT: 1050 mL / NET: -981 mL    30 Dec 2020 07:01  -  30 Dec 2020 11:26  --------------------------------------------------------  IN: 180 mL / OUT: 0 mL / NET: 180 mL      Height (cm): 157.5 (12-29 @ 22:41)  Weight (kg): 104.9 (12-29 @ 22:41)  BMI (kg/m2): 42.3 (12-29 @ 22:41)  BSA (m2): 2.03 (12-29 @ 22:41)                                     Appearance: Normal	  Neck: Supple, - JVD; no Carotid Bruit   Cardiovascular: Normal S1 S2, No murmurs, rubs, gallops  Respiratory: Lungs clear to auscultation/No Rales, Rhonchi, Wheezing	  Gastrointestinal:  Soft, Non-tender, ND, + BS x4	  Extremities: Normal range of motion, No clubbing, cyanosis or edema b/l LE   Vascular: Peripheral pulses palpable 2+ bilaterally carotids, radial. 1+ femoral, DP/PT  Neurologic:  A & O x 3, Mood & affect appropriate    	    ECG:  	  RADIOLOGY:   DIAGNOSTIC TESTING:  [x ] Echocardiogram: < from: Echocardiogram (02.11.20 @ 09:14) >  CONCLUSIONS:     1. Normal left ventricular size and systolic function.   2. Mildly dilated right ventricle.   3. Normal right ventricular systolic function.   4. Moderate-to-severe tricuspid regurgitation.   5. Pulmonary hypertension present, pulmonary artery systolic pressure is 42.90 mmHg.   6. No pericardial effusion.   7. Right heart device leads.    < end of copied text >    [ ]  Catheterization:  [ ] Stress Test:    [ ] JENNY  OTHER: 	    LABS:	 	  CARDIAC MARKERS:                          13.4   6.38  )-----------( 183      ( 30 Dec 2020 07:34 )             42.3     12-30    142  |  105  |  15  ----------------------------<  146<H>  4.3   |  25  |  0.69    Ca    8.7      30 Dec 2020 07:35  Mg     2.3     12-29    TPro  7.3  /  Alb  3.9  /  TBili  0.3  /  DBili  x   /  AST  28  /  ALT  28  /  AlkPhos  81  12-29  TSH: Thyroid Stimulating Hormone, Serum: 3.145 uIU/mL (12-29 @ 11:34)    PT/INR - ( 29 Dec 2020 14:20 )   PT: 11.2 sec;   INR: 0.93     PTT - ( 30 Dec 2020 07:33 )  PTT:69.9 sec

## 2020-12-30 NOTE — PROGRESS NOTE ADULT - PROBLEM SELECTOR PLAN 6
-Continue Synthroid 25 mcg daily.    VTE PPx: Lovenox SC daily     Dispo: F/U Cardiac Catheterization results and Echo    Case discussed with Dr. Martinez, patient and daughter. -Continue Synthroid 25 mcg daily.    DVT: Heparin gtt  Dispo: F/U Cardiac Catheterization results and Echo

## 2020-12-30 NOTE — PROGRESS NOTE ADULT - ATTENDING COMMENTS
Initial attending contact date 12/30/20. See PA note written above for details. I reviewed the PA documentation. I have personally seen and examined this patient. I reviewed vitals, labs, medications, cardiac studies, and additional imaging. I agree with the above PA's findings and plans as written above with the following additions/statements.      -69 y/o Female with PMH of CAD s/p 19 stents, NSTEMI in the setting of COVID managed medically during admission 9/15-9/16/2020 @[facility], 3VCABG in 2008 (LIMA-LAD, SVG-OM1, SVG-RCA) chronic angina, Hx of VT s/p ICD placement ([facility]. Placed 2013, last interpreted 9/2020 with no events per pt), T2DM, HTN, HLD, and Hypothyroidism admitted with unstable angina  -EKG NSR with new wide QRS compared to prior EKG, trop neg x 2  -Currently pain free  -Plan for [facility] today  -Cont ASA/plavix/IV heparin/atorva/ Initial attending contact date 12/30/20. See PA note written above for details. I reviewed the PA documentation. I have personally seen and examined this patient. I reviewed vitals, labs, medications, cardiac studies, and additional imaging. I agree with the above PA's findings and plans as written above with the following additions/statements.      -71 y/o Female with PMH of CAD s/p 19 stents, NSTEMI in the setting of COVID managed medically during admission 9/15-9/16/2020 @[facility], 3VCABG in 2008 (LIMA-LAD, SVG-OM1, SVG-RCA) chronic angina, Hx of VT s/p ICD placement ([facility] Placed 2013, last interpreted 9/2020 with no events per pt), T2DM, HTN, HLD, and Hypothyroidism admitted with unstable angina  -EKG NSR with new wide QRS compared to prior EKG, trop neg x 2  -Currently pain free  -Plan for [facility] today  -Cont ASA/plavix/IV heparin/increase atorva 80/toprol  -BP uncontrolled: increase imdur 90mg qd. Cont lisinopril/amlodipine  -Dispo pending results of cath Initial attending contact date 12/30/20. See PA note written above for details. I reviewed the PA documentation. I have personally seen and examined this patient. I reviewed vitals, labs, medications, cardiac studies, and additional imaging. I agree with the above PA's findings and plans as written above with the following additions/statements.      -71 y/o Female with PMH of CAD s/p 19 stents, NSTEMI in the setting of COVID managed medically during admission 9/15-9/16/2020 @[facility], 3VCABG in 2008 (LIMA-LAD, SVG-OM1, SVG-RCA) chronic angina, Hx of VT s/p ICD placement ([facility] Placed 2013, last interpreted 9/2020 with no events per pt), T2DM, HTN, HLD, and Hypothyroidism admitted with unstable angina  -EKG NSR with new wide QRS compared to prior EKG, trop neg x 2  -Currently pain free  -Plan for [facility] today  -Cont ASA/plavix/IV heparin/increase atorva 80/toprol  -BP uncontrolled: increase imdur 90mg qd. Cont lisinopril/amlodipine  -ICD last interrogated 8/20  -Dispo pending results of cath

## 2020-12-30 NOTE — PROGRESS NOTE ADULT - PROBLEM SELECTOR PLAN 5
-Continue Lipitor 80 mg daily, Lovaza 1 gram daily. -Home Lipitor 40 mg daily increased to 80mg QD on 12/30

## 2020-12-30 NOTE — PROGRESS NOTE ADULT - PROBLEM SELECTOR PLAN 3
-No EF documented in the last several years (EF 55% by cardiac catheterization 2013)  -ICD interrogated during office visit 2/7/2020 with cardiologist Dr. Pritesh Moss: per daughter normal function. -Normal LVEF per echo 2/2020  -ICD interrogated during office visit 2/7/2020 with cardiologist Dr. Pritesh Moss: per daughter normal function. -Normal LVEF per echo 2/2020  -ICD interrogated during office visit 9/2020 with cardiologist Dr. Pritesh Moss: per daughter normal function.

## 2020-12-31 ENCOUNTER — TRANSCRIPTION ENCOUNTER (OUTPATIENT)
Age: 70
End: 2020-12-31

## 2020-12-31 VITALS
OXYGEN SATURATION: 92 % | RESPIRATION RATE: 18 BRPM | SYSTOLIC BLOOD PRESSURE: 150 MMHG | DIASTOLIC BLOOD PRESSURE: 70 MMHG | HEART RATE: 68 BPM

## 2020-12-31 LAB
ANION GAP SERPL CALC-SCNC: 11 MMOL/L — SIGNIFICANT CHANGE UP (ref 5–17)
BUN SERPL-MCNC: 14 MG/DL — SIGNIFICANT CHANGE UP (ref 7–23)
CALCIUM SERPL-MCNC: 8.6 MG/DL — SIGNIFICANT CHANGE UP (ref 8.4–10.5)
CHLORIDE SERPL-SCNC: 106 MMOL/L — SIGNIFICANT CHANGE UP (ref 96–108)
CO2 SERPL-SCNC: 25 MMOL/L — SIGNIFICANT CHANGE UP (ref 22–31)
CREAT SERPL-MCNC: 0.76 MG/DL — SIGNIFICANT CHANGE UP (ref 0.5–1.3)
GLUCOSE SERPL-MCNC: 115 MG/DL — HIGH (ref 70–99)
HCT VFR BLD CALC: 41.1 % — SIGNIFICANT CHANGE UP (ref 34.5–45)
HGB BLD-MCNC: 12.8 G/DL — SIGNIFICANT CHANGE UP (ref 11.5–15.5)
MAGNESIUM SERPL-MCNC: 2.4 MG/DL — SIGNIFICANT CHANGE UP (ref 1.6–2.6)
MCHC RBC-ENTMCNC: 27.8 PG — SIGNIFICANT CHANGE UP (ref 27–34)
MCHC RBC-ENTMCNC: 31.1 GM/DL — LOW (ref 32–36)
MCV RBC AUTO: 89.2 FL — SIGNIFICANT CHANGE UP (ref 80–100)
NRBC # BLD: 0 /100 WBCS — SIGNIFICANT CHANGE UP (ref 0–0)
PLATELET # BLD AUTO: 177 K/UL — SIGNIFICANT CHANGE UP (ref 150–400)
POTASSIUM SERPL-MCNC: 4.3 MMOL/L — SIGNIFICANT CHANGE UP (ref 3.5–5.3)
POTASSIUM SERPL-SCNC: 4.3 MMOL/L — SIGNIFICANT CHANGE UP (ref 3.5–5.3)
RBC # BLD: 4.61 M/UL — SIGNIFICANT CHANGE UP (ref 3.8–5.2)
RBC # FLD: 14.1 % — SIGNIFICANT CHANGE UP (ref 10.3–14.5)
SODIUM SERPL-SCNC: 142 MMOL/L — SIGNIFICANT CHANGE UP (ref 135–145)
WBC # BLD: 6.52 K/UL — SIGNIFICANT CHANGE UP (ref 3.8–10.5)
WBC # FLD AUTO: 6.52 K/UL — SIGNIFICANT CHANGE UP (ref 3.8–10.5)

## 2020-12-31 PROCEDURE — 99239 HOSP IP/OBS DSCHRG MGMT >30: CPT

## 2020-12-31 PROCEDURE — 93010 ELECTROCARDIOGRAM REPORT: CPT

## 2020-12-31 PROCEDURE — 93306 TTE W/DOPPLER COMPLETE: CPT | Mod: 26

## 2020-12-31 RX ORDER — METOPROLOL TARTRATE 50 MG
1 TABLET ORAL
Qty: 30 | Refills: 2
Start: 2020-12-31 | End: 2021-03-30

## 2020-12-31 RX ORDER — CHLORTHALIDONE 50 MG
1 TABLET ORAL
Qty: 30 | Refills: 2
Start: 2020-12-31 | End: 2021-03-30

## 2020-12-31 RX ORDER — METOPROLOL TARTRATE 50 MG
1 TABLET ORAL
Qty: 0 | Refills: 0 | DISCHARGE

## 2020-12-31 RX ORDER — ATORVASTATIN CALCIUM 80 MG/1
1 TABLET, FILM COATED ORAL
Qty: 30 | Refills: 2
Start: 2020-12-31 | End: 2021-03-30

## 2020-12-31 RX ORDER — CLOPIDOGREL BISULFATE 75 MG/1
75 TABLET, FILM COATED ORAL DAILY
Refills: 0 | Status: DISCONTINUED | OUTPATIENT
Start: 2020-12-31 | End: 2020-12-31

## 2020-12-31 RX ORDER — ISOSORBIDE MONONITRATE 60 MG/1
3 TABLET, EXTENDED RELEASE ORAL
Qty: 90 | Refills: 2
Start: 2020-12-31 | End: 2021-03-30

## 2020-12-31 RX ADMIN — AMLODIPINE BESYLATE 10 MILLIGRAM(S): 2.5 TABLET ORAL at 08:07

## 2020-12-31 RX ADMIN — Medication 25 MICROGRAM(S): at 05:57

## 2020-12-31 RX ADMIN — CLOPIDOGREL BISULFATE 75 MILLIGRAM(S): 75 TABLET, FILM COATED ORAL at 11:38

## 2020-12-31 RX ADMIN — ISOSORBIDE MONONITRATE 90 MILLIGRAM(S): 60 TABLET, EXTENDED RELEASE ORAL at 11:38

## 2020-12-31 RX ADMIN — Medication 100 MILLIGRAM(S): at 05:57

## 2020-12-31 RX ADMIN — Medication 2: at 11:38

## 2020-12-31 RX ADMIN — Medication 81 MILLIGRAM(S): at 11:38

## 2020-12-31 NOTE — DISCHARGE NOTE NURSING/CASE MANAGEMENT/SOCIAL WORK - PATIENT PORTAL LINK FT
You can access the FollowMyHealth Patient Portal offered by [facility] by registering at the following website: [email or web address]. By joining [facility]’s FollowMyHealth portal, you will also be able to view your health information using other applications (apps) compatible with our system.

## 2020-12-31 NOTE — DISCHARGE NOTE PROVIDER - HOSPITAL COURSE
71 yo Simone and English speaking female with a PMHx of HTN, hyperlipidemia, DMII, hypothyroidism, known CAD with prior 3VCABG 2013 (LIMA-LAD, SVG-OM1, SVG-RCA (all patent by angiogram 2013), multiple PCIs most recent intervention s/p MATTHEW proximal ramus 2/2016 @ [facility], chronic systolic CHF (now w/ preserved EF), history of VT s/p ICD (with most recent interrogation Friday 2/7/2020) who presented to [facility] ED (12/29/20) with unstable angina with admission to cardiac telemetry.  Patient is s/p diagnostic cardiac catheterization revealing dLM 75%, pLAD 100%, pLCX 100%, pRamus 100%, mRCA 99% w/ patient LIMA to LAD, Patent SVG to RPDA, and SVG to OM1 w/ patent prior stent midvessel.  Cath results unchanged from prior and no intervention required.  Patient is recommended for medical management.  Echocardiogram on 12/31 revealing Normal left and right ventricular size and systolic function, Mild symmetric left ventricular hypertrophy, Mild mitral regurgitation, Pulmonary hypertension present, pulmonary artery systolic pressure is 58 mmHg.  Patient found to have elevated blood pressure during hospital stay and home medications are adjusted accordingly.  She is discharged on home Norvasc 10mg daily, Metoprolol XL 100mg daily, ASA 81mg daily, Plavix 75mg daily, Increased Enalapril 20mg daily, increased imdur 90mg daily, increased atorvastatin 80mg daily, and new Chlorthalidone 25mg daily.  Patient remains HD stable.  Labs, Vitals, Meds reviewed with Dr. Duarte and patient deemed stable for discharge home.  All new prescriptions are E prescribed to pharmacy of choice.  Patient will follow up with her outpatient Cardiologist Dr. Moss in 1-2 weeks.  Discharge instructions and medications reviewed with patient at bedside as well as daughter on the phone.

## 2020-12-31 NOTE — DISCHARGE NOTE PROVIDER - NSDCMRMEDTOKEN_GEN_ALL_CORE_FT
amLODIPine 10 mg oral tablet: 1 tab(s) orally once a day   aspirin 81 mg oral tablet: 1 tab(s) orally once a day  atorvastatin 80 mg oral tablet: 1 tab(s) orally once a day (at bedtime)  chlorthalidone 25 mg oral tablet: 1 tab(s) orally once a day   clopidogrel 75 mg oral tablet: 1 tab(s) orally once a day  Co Q-10 100 mg oral capsule: 1 cap(s) orally once a day  enalapril 20 mg oral tablet: 1 tab(s) orally once a day  Farxiga 5 mg oral tablet: 1 tab(s) orally once a day  fluocinolone 0.01% otic solution: 5 drop(s) to each affected ear 2 times a day  isosorbide mononitrate 30 mg oral tablet, extended release: 3 tab(s) orally once a day (in the morning)   levothyroxine 25 mcg (0.025 mg) oral tablet: 1 tab(s) orally once a day  metFORMIN 500 mg oral tablet, extended release: 1 tab(s) orally once a day (restart on 9/18/20)  metoprolol succinate 100 mg oral tablet, extended release: 1 tab(s) orally once a day  Omega-3 1000 mg oral capsule: 1 cap(s) orally once a day  Vitamin B12 1000 mcg oral tablet: 1 tab(s) orally once a day

## 2020-12-31 NOTE — DISCHARGE NOTE PROVIDER - NSDCCPCAREPLAN_GEN_ALL_CORE_FT
PRINCIPAL DISCHARGE DIAGNOSIS  Diagnosis: Coronary artery disease  Assessment and Plan of Treatment: YOu were admitted with chest pain.  You underwent a cardiac catheterization revealing patent grafts and stents.  No new blockages in your heart and no intervention was required during your hospital stay.  YOu are recommended to treat your continued chest pain with medical management with adjustment of your medications as below  - CONTINUE norvasc 10mg daily  - CONTINUE metoprolol XL 100mg daily  - CONTINUE ASA 81mg daily, and Plavix 75mg daily  - INCREASE Atorvastatin to 80mg daily at bedtime  - INCREASE Imdur to 90mg daily.   - INCREASE Enalapril to 20mg daily  - START Chlorthalidone 25mg daily.   - Follow up with Dr. Moss in 1-2 weeks.  Call the office to make an appointment.   You underwent a periphal angiogram and should wait 3 days before returning to ordinary activities. Do not drive for 2 days. Consult your doctor before returning to vigorous activity. You may return to work in 3-5 days. The catheter from your groin was removed and you should remove the dressing in 24 hours. You may shower once the dressing is removed, but avoid baths, hot tubs, or swimming for 5 days to prevent infection. If you notice bleeding from the site, hardening and pain at the site, drainage or redness from the site, coolness/paleness of the extremity, swelling, or fever, please call [phone].sent electronically to your pharmacy.      SECONDARY DISCHARGE DIAGNOSES  Diagnosis: H/O: HTN (hypertension)  Assessment and Plan of Treatment: YOur blood pressure was elevated during your hospital stay which may be the reason why you are still having chest pain.  Your home blood pressure medications were adjusted to help with pressure and chest pain  - CONTINUE norvasc 10mg daily  - CONTINUE toprol XL 100mg daily  - INCREASE enalapril to 20mg daily  - INCREASE imdur to 90mg daily  - START taking Chlorthalidone 25mg daily.   - Follow up with your Cardiologist Dr. Moss regarding the new changes.    Diagnosis: HLD (hyperlipidemia)  Assessment and Plan of Treatment: please INCREASE your Atorvastatin to 80mg daily at bedtime.  This will help prevent further coronary artery disease build up.    Diagnosis: Hypothyroidism  Assessment and Plan of Treatment: Continue thyroid medication as usual.

## 2021-01-05 DIAGNOSIS — I27.20 PULMONARY HYPERTENSION, UNSPECIFIED: ICD-10-CM

## 2021-01-05 DIAGNOSIS — I25.82 CHRONIC TOTAL OCCLUSION OF CORONARY ARTERY: ICD-10-CM

## 2021-01-05 DIAGNOSIS — Z95.5 PRESENCE OF CORONARY ANGIOPLASTY IMPLANT AND GRAFT: ICD-10-CM

## 2021-01-05 DIAGNOSIS — E11.9 TYPE 2 DIABETES MELLITUS WITHOUT COMPLICATIONS: ICD-10-CM

## 2021-01-05 DIAGNOSIS — I11.0 HYPERTENSIVE HEART DISEASE WITH HEART FAILURE: ICD-10-CM

## 2021-01-05 DIAGNOSIS — I07.1 RHEUMATIC TRICUSPID INSUFFICIENCY: ICD-10-CM

## 2021-01-05 DIAGNOSIS — I50.22 CHRONIC SYSTOLIC (CONGESTIVE) HEART FAILURE: ICD-10-CM

## 2021-01-05 DIAGNOSIS — Z95.810 PRESENCE OF AUTOMATIC (IMPLANTABLE) CARDIAC DEFIBRILLATOR: ICD-10-CM

## 2021-01-05 DIAGNOSIS — E78.5 HYPERLIPIDEMIA, UNSPECIFIED: ICD-10-CM

## 2021-01-05 DIAGNOSIS — E03.9 HYPOTHYROIDISM, UNSPECIFIED: ICD-10-CM

## 2021-01-05 DIAGNOSIS — Z86.19 PERSONAL HISTORY OF OTHER INFECTIOUS AND PARASITIC DISEASES: ICD-10-CM

## 2021-01-05 DIAGNOSIS — I25.110 ATHEROSCLEROTIC HEART DISEASE OF NATIVE CORONARY ARTERY WITH UNSTABLE ANGINA PECTORIS: ICD-10-CM

## 2021-01-05 DIAGNOSIS — Z95.1 PRESENCE OF AORTOCORONARY BYPASS GRAFT: ICD-10-CM

## 2021-01-05 DIAGNOSIS — I44.7 LEFT BUNDLE-BRANCH BLOCK, UNSPECIFIED: ICD-10-CM

## 2021-01-05 DIAGNOSIS — R07.89 OTHER CHEST PAIN: ICD-10-CM

## 2021-01-05 DIAGNOSIS — I25.2 OLD MYOCARDIAL INFARCTION: ICD-10-CM

## 2021-01-05 DIAGNOSIS — Z79.02 LONG TERM (CURRENT) USE OF ANTITHROMBOTICS/ANTIPLATELETS: ICD-10-CM

## 2021-01-05 DIAGNOSIS — Z79.82 LONG TERM (CURRENT) USE OF ASPIRIN: ICD-10-CM

## 2021-01-21 PROCEDURE — 84443 ASSAY THYROID STIM HORMONE: CPT

## 2021-01-21 PROCEDURE — 87635 SARS-COV-2 COVID-19 AMP PRB: CPT

## 2021-01-21 PROCEDURE — 80061 LIPID PANEL: CPT

## 2021-01-21 PROCEDURE — C1769: CPT

## 2021-01-21 PROCEDURE — 93306 TTE W/DOPPLER COMPLETE: CPT

## 2021-01-21 PROCEDURE — 85730 THROMBOPLASTIN TIME PARTIAL: CPT

## 2021-01-21 PROCEDURE — C1894: CPT

## 2021-01-21 PROCEDURE — 85025 COMPLETE CBC W/AUTO DIFF WBC: CPT

## 2021-01-21 PROCEDURE — 83036 HEMOGLOBIN GLYCOSYLATED A1C: CPT

## 2021-01-21 PROCEDURE — 82550 ASSAY OF CK (CPK): CPT

## 2021-01-21 PROCEDURE — 83735 ASSAY OF MAGNESIUM: CPT

## 2021-01-21 PROCEDURE — 80048 BASIC METABOLIC PNL TOTAL CA: CPT

## 2021-01-21 PROCEDURE — 85027 COMPLETE CBC AUTOMATED: CPT

## 2021-01-21 PROCEDURE — 85610 PROTHROMBIN TIME: CPT

## 2021-01-21 PROCEDURE — 82962 GLUCOSE BLOOD TEST: CPT

## 2021-01-21 PROCEDURE — C1887: CPT

## 2021-01-21 PROCEDURE — 93005 ELECTROCARDIOGRAM TRACING: CPT

## 2021-01-21 PROCEDURE — 80053 COMPREHEN METABOLIC PANEL: CPT

## 2021-01-21 PROCEDURE — 36415 COLL VENOUS BLD VENIPUNCTURE: CPT

## 2021-01-21 PROCEDURE — 84484 ASSAY OF TROPONIN QUANT: CPT

## 2021-01-21 PROCEDURE — 82553 CREATINE MB FRACTION: CPT

## 2021-01-21 PROCEDURE — 99285 EMERGENCY DEPT VISIT HI MDM: CPT

## 2021-02-19 ENCOUNTER — NON-APPOINTMENT (OUTPATIENT)
Age: 71
End: 2021-02-19

## 2021-02-19 ENCOUNTER — APPOINTMENT (OUTPATIENT)
Dept: ELECTROPHYSIOLOGY | Facility: CLINIC | Age: 71
End: 2021-02-19
Payer: COMMERCIAL

## 2021-02-19 PROCEDURE — 93296 REM INTERROG EVL PM/IDS: CPT

## 2021-02-19 PROCEDURE — 93295 DEV INTERROG REMOTE 1/2/MLT: CPT

## 2021-03-09 PROBLEM — Z95.810 PRESENCE OF AUTOMATIC (IMPLANTABLE) CARDIAC DEFIBRILLATOR: Chronic | Status: ACTIVE | Noted: 2020-12-29

## 2021-05-21 ENCOUNTER — APPOINTMENT (OUTPATIENT)
Dept: ELECTROPHYSIOLOGY | Facility: CLINIC | Age: 71
End: 2021-05-21
Payer: COMMERCIAL

## 2021-05-21 ENCOUNTER — NON-APPOINTMENT (OUTPATIENT)
Age: 71
End: 2021-05-21

## 2021-05-21 PROCEDURE — 93295 DEV INTERROG REMOTE 1/2/MLT: CPT

## 2021-05-21 PROCEDURE — 93296 REM INTERROG EVL PM/IDS: CPT

## 2021-06-29 ENCOUNTER — APPOINTMENT (OUTPATIENT)
Dept: UROGYNECOLOGY | Facility: CLINIC | Age: 71
End: 2021-06-29
Payer: COMMERCIAL

## 2021-06-29 VITALS
DIASTOLIC BLOOD PRESSURE: 69 MMHG | HEIGHT: 62 IN | HEART RATE: 70 BPM | TEMPERATURE: 97 F | BODY MASS INDEX: 39.38 KG/M2 | SYSTOLIC BLOOD PRESSURE: 143 MMHG | WEIGHT: 214 LBS

## 2021-06-29 DIAGNOSIS — Z83.49 FAMILY HISTORY OF OTHER ENDOCRINE, NUTRITIONAL AND METABOLIC DISEASES: ICD-10-CM

## 2021-06-29 DIAGNOSIS — Z86.39 PERSONAL HISTORY OF OTHER ENDOCRINE, NUTRITIONAL AND METABOLIC DISEASE: ICD-10-CM

## 2021-06-29 DIAGNOSIS — Z82.49 FAMILY HISTORY OF ISCHEMIC HEART DISEASE AND OTHER DISEASES OF THE CIRCULATORY SYSTEM: ICD-10-CM

## 2021-06-29 DIAGNOSIS — Z87.19 PERSONAL HISTORY OF OTHER DISEASES OF THE DIGESTIVE SYSTEM: ICD-10-CM

## 2021-06-29 DIAGNOSIS — Z86.79 PERSONAL HISTORY OF OTHER DISEASES OF THE CIRCULATORY SYSTEM: ICD-10-CM

## 2021-06-29 DIAGNOSIS — Z78.9 OTHER SPECIFIED HEALTH STATUS: ICD-10-CM

## 2021-06-29 DIAGNOSIS — Z83.3 FAMILY HISTORY OF DIABETES MELLITUS: ICD-10-CM

## 2021-06-29 DIAGNOSIS — Z56.0 UNEMPLOYMENT, UNSPECIFIED: ICD-10-CM

## 2021-06-29 LAB
BILIRUB UR QL STRIP: NORMAL
CLARITY UR: CLEAR
COLLECTION METHOD: NORMAL
GLUCOSE UR-MCNC: 500
HCG UR QL: 0.2 EU/DL
HGB UR QL STRIP.AUTO: NORMAL
KETONES UR-MCNC: NORMAL
LEUKOCYTE ESTERASE UR QL STRIP: NORMAL
NITRITE UR QL STRIP: NORMAL
PH UR STRIP: 6.5
PROT UR STRIP-MCNC: NORMAL
SP GR UR STRIP: 1.01

## 2021-06-29 PROCEDURE — 51725 SIMPLE CYSTOMETROGRAM: CPT

## 2021-06-29 PROCEDURE — 51736 URINE FLOW MEASUREMENT: CPT

## 2021-06-29 PROCEDURE — 99205 OFFICE O/P NEW HI 60 MIN: CPT | Mod: 25

## 2021-06-29 PROCEDURE — 99072 ADDL SUPL MATRL&STAF TM PHE: CPT

## 2021-06-29 SDOH — ECONOMIC STABILITY - INCOME SECURITY: UNEMPLOYMENT, UNSPECIFIED: Z56.0

## 2021-06-29 NOTE — HISTORY OF PRESENT ILLNESS
[FreeTextEntry1] : this is a 71-year-old woman with significant comorbidities, including high BMI, severe, cardiovascular disease with 17 stents, pacemaker, and bilateral knee replacements..\par \par Chest complaints of urinary incontinence, which are distinctly following her afternoon and nighttime sleep. When she wakes up after taking a nap or sleeping for 2 or 3 hours at night. She leaks just as she wakes up and she dribbles on the way to the bathroom..\par \par She denies urinary loss with cough, sneeze.\par \par \par Exam urine analysis did not reveal an explanation for her symptoms.\par \par Simple cystometry revealed first sensation of urine at 100 cc urgency at 250 cc capacity was 350 cc. She is significant sensory urgency but does think that her contractions were not appreciated. Cough stress testing was negative.\par \par Physical exam was noted for high BMI, no anatomic defect, and significant lower extremity edema\par \par \par s my impression that this patient as having overflow incontinence or overactive bladder triggered by high fluid volume being on a diuretic and this occurs after she supine likely mobilizing the fluid in the lower extremities.\par \par \par I counseled her to discuss with cardiology, possibly moving the diuretic later in the day closer to her afternoon and evening, sleep.\par \par Recommended low salt diet, and avoiding overhydration.\par \par \par Dietary modifications for the overactive bladder counseled including decreasing coffee, alcohol, diet, suite nurse by seafood, and acidic foods.\par \par \par At the level of her comorbidities and fluid retention. Systemically I am reluctant to give her bladder medication. At this time.  I would like to see how she improves with recommendations above, which are a change in timing of medication, and behavioral changes\par \par \par \par \par \par \par

## 2021-06-30 PROBLEM — Z86.39 HISTORY OF DIABETES MELLITUS: Status: RESOLVED | Noted: 2021-06-30 | Resolved: 2021-06-30

## 2021-06-30 PROBLEM — Z82.49 FAMILY HISTORY OF CARDIAC DISORDER: Status: ACTIVE | Noted: 2021-06-30

## 2021-06-30 PROBLEM — Z82.49 FAMILY HISTORY OF HYPERTENSION: Status: ACTIVE | Noted: 2021-06-30

## 2021-06-30 PROBLEM — Z78.9 SOCIAL ALCOHOL USE: Status: ACTIVE | Noted: 2021-06-30

## 2021-06-30 PROBLEM — Z86.39 HISTORY OF THYROID DISORDER: Status: RESOLVED | Noted: 2021-06-30 | Resolved: 2021-06-30

## 2021-06-30 PROBLEM — Z83.3 FAMILY HISTORY OF DIABETES MELLITUS: Status: ACTIVE | Noted: 2021-06-30

## 2021-06-30 PROBLEM — Z87.19 HISTORY OF HEPATIC DISEASE: Status: RESOLVED | Noted: 2021-06-30 | Resolved: 2021-06-30

## 2021-06-30 PROBLEM — Z86.79 HISTORY OF HYPERTENSION: Status: RESOLVED | Noted: 2021-06-30 | Resolved: 2021-06-30

## 2021-06-30 PROBLEM — Z56.0 UNEMPLOYED: Status: ACTIVE | Noted: 2021-06-30

## 2021-06-30 PROBLEM — Z83.49 FAMILY HISTORY OF THYROID DISEASE: Status: ACTIVE | Noted: 2021-06-30

## 2021-06-30 PROBLEM — Z86.39 HISTORY OF HYPERLIPIDEMIA: Status: RESOLVED | Noted: 2021-06-30 | Resolved: 2021-06-30

## 2021-07-02 DIAGNOSIS — N39.0 URINARY TRACT INFECTION, SITE NOT SPECIFIED: ICD-10-CM

## 2021-07-02 RX ORDER — NITROFURANTOIN (MONOHYDRATE/MACROCRYSTALS) 25; 75 MG/1; MG/1
100 CAPSULE ORAL
Qty: 10 | Refills: 0 | Status: ACTIVE | COMMUNITY
Start: 2021-07-02 | End: 1900-01-01

## 2021-07-23 ENCOUNTER — APPOINTMENT (OUTPATIENT)
Dept: UROGYNECOLOGY | Facility: CLINIC | Age: 71
End: 2021-07-23
Payer: COMMERCIAL

## 2021-07-23 VITALS — SYSTOLIC BLOOD PRESSURE: 143 MMHG | TEMPERATURE: 97.5 F | DIASTOLIC BLOOD PRESSURE: 69 MMHG

## 2021-07-23 DIAGNOSIS — R39.11 HESITANCY OF MICTURITION: ICD-10-CM

## 2021-07-23 PROCEDURE — 99072 ADDL SUPL MATRL&STAF TM PHE: CPT

## 2021-07-23 PROCEDURE — 99213 OFFICE O/P EST LOW 20 MIN: CPT

## 2021-07-23 RX ORDER — PHENAZOPYRIDINE HYDROCHLORIDE 100 MG/1
100 TABLET ORAL
Qty: 90 | Refills: 0 | Status: ACTIVE | COMMUNITY
Start: 2021-07-23 | End: 1900-01-01

## 2021-07-23 NOTE — HISTORY OF PRESENT ILLNESS
[FreeTextEntry1] : this is a 71-year-old woman with significant comorbidities, including high BMI, severe, cardiovascular disease with 17 stents, pacemaker, and bilateral knee replacements..\par \par Chief complaints of urinary incontinence, which are distinctly following her afternoon diretic and nighttime sleep. When she wakes up after taking a nap or sleeping for 2 or 3 hours at night. She leaks just as she wakes up and she dribbles on the way to the bathroom..\par \par  my impression was  that this patient as having overflow incontinence or overactive bladder triggered by high fluid volume being on a diuretic and this occurs after she supine likely mobilizing the fluid in the lower extremities.\par \par She has reduced ovverhydration, low salt diet, oab diet, and moved timing of diuretic and had moderate improvement.  She has mild irritative urinary symptoms but denies dysuria.  moving the diuretic later in the day closer to her afternoon and evening, sleep.\par I remain reluctant with severe cardiac history to treat with bladder meds other than pyridium which I will provide for general irritative symptoms. Risks, benefits and adverse reaction have been reviewed.  We flaca otherwise continue treatment as outlined above.\par \par Counseling was greater than 50 percent of encounter which included  19   minute face to face time for direct counseling.\par \par A chaperone was present for the entirety of the physical examination and for the exam room portion of patient interview\par \par \par \par \par \par \par \par

## 2021-08-20 ENCOUNTER — NON-APPOINTMENT (OUTPATIENT)
Age: 71
End: 2021-08-20

## 2021-08-20 ENCOUNTER — APPOINTMENT (OUTPATIENT)
Dept: ELECTROPHYSIOLOGY | Facility: CLINIC | Age: 71
End: 2021-08-20
Payer: COMMERCIAL

## 2021-08-20 PROCEDURE — 93295 DEV INTERROG REMOTE 1/2/MLT: CPT

## 2021-08-20 PROCEDURE — 93296 REM INTERROG EVL PM/IDS: CPT

## 2021-09-14 ENCOUNTER — APPOINTMENT (OUTPATIENT)
Dept: ELECTROPHYSIOLOGY | Facility: CLINIC | Age: 71
End: 2021-09-14

## 2021-09-17 ENCOUNTER — APPOINTMENT (OUTPATIENT)
Dept: ELECTROPHYSIOLOGY | Facility: CLINIC | Age: 71
End: 2021-09-17

## 2021-10-05 ENCOUNTER — APPOINTMENT (OUTPATIENT)
Dept: UROGYNECOLOGY | Facility: CLINIC | Age: 71
End: 2021-10-05
Payer: COMMERCIAL

## 2021-10-05 VITALS — DIASTOLIC BLOOD PRESSURE: 78 MMHG | SYSTOLIC BLOOD PRESSURE: 159 MMHG | TEMPERATURE: 97 F | HEART RATE: 66 BPM

## 2021-10-05 DIAGNOSIS — R35.0 FREQUENCY OF MICTURITION: ICD-10-CM

## 2021-10-05 PROCEDURE — 99213 OFFICE O/P EST LOW 20 MIN: CPT

## 2021-10-05 NOTE — HISTORY OF PRESENT ILLNESS
[FreeTextEntry1] : this is a 71-year-old woman with significant comorbidities, including high BMI, severe, cardiovascular disease with 17 stents, pacemaker, and bilateral knee replacements..\par \par Chief complaints of urinary incontinence, which are distinctly following her afternoon diuretic and nighttime during sleep.\par \par \par she has had excellent improvement modifying her fluid intake, reducing over hydration, low salt die, overactive bladder diet ,  and we have moved the timing of her diuretic to later in the day, which has improved her overnight frequency and leakage. \par \par  my impression was  that this patient as having overflow incontinence or overactive bladder triggered by high fluid volume being on a diuretic and this occurs after she supine likely mobilizing the fluid in the lower extremities.\par \par I remain reluctant with severe cardiac history to treat with bladder meds other than pyridium.  Her improvement is satisfactory enough that she does not wish for further intervention. \par \par Counseling was greater than 50 percent of encounter which included  26   minute face to face time for direct counseling.\par \par A chaperone was present for the entirety of the physical examination and for the exam room portion of patient interview\par \par \par \par \par \par \par \par

## 2021-10-22 ENCOUNTER — NON-APPOINTMENT (OUTPATIENT)
Age: 71
End: 2021-10-22

## 2021-11-19 ENCOUNTER — NON-APPOINTMENT (OUTPATIENT)
Age: 71
End: 2021-11-19

## 2021-11-19 ENCOUNTER — APPOINTMENT (OUTPATIENT)
Dept: ELECTROPHYSIOLOGY | Facility: CLINIC | Age: 71
End: 2021-11-19
Payer: COMMERCIAL

## 2021-11-19 PROCEDURE — 93295 DEV INTERROG REMOTE 1/2/MLT: CPT

## 2021-11-19 PROCEDURE — 93296 REM INTERROG EVL PM/IDS: CPT | Mod: NC

## 2022-02-02 ENCOUNTER — RESULT CHARGE (OUTPATIENT)
Age: 72
End: 2022-02-02

## 2022-02-03 ENCOUNTER — APPOINTMENT (OUTPATIENT)
Dept: ELECTROPHYSIOLOGY | Facility: CLINIC | Age: 72
End: 2022-02-03
Payer: COMMERCIAL

## 2022-02-03 ENCOUNTER — NON-APPOINTMENT (OUTPATIENT)
Age: 72
End: 2022-02-03

## 2022-02-03 VITALS
HEIGHT: 62 IN | SYSTOLIC BLOOD PRESSURE: 121 MMHG | HEART RATE: 61 BPM | DIASTOLIC BLOOD PRESSURE: 79 MMHG | OXYGEN SATURATION: 98 % | WEIGHT: 220 LBS | BODY MASS INDEX: 40.48 KG/M2

## 2022-02-03 PROCEDURE — 93282 PRGRMG EVAL IMPLANTABLE DFB: CPT

## 2022-02-03 PROCEDURE — 93000 ELECTROCARDIOGRAM COMPLETE: CPT | Mod: 59

## 2022-02-08 ENCOUNTER — APPOINTMENT (OUTPATIENT)
Dept: UROGYNECOLOGY | Facility: CLINIC | Age: 72
End: 2022-02-08
Payer: COMMERCIAL

## 2022-02-08 DIAGNOSIS — N39.46 MIXED INCONTINENCE: ICD-10-CM

## 2022-02-08 PROCEDURE — 99214 OFFICE O/P EST MOD 30 MIN: CPT

## 2022-02-08 PROCEDURE — 99072 ADDL SUPL MATRL&STAF TM PHE: CPT

## 2022-02-08 NOTE — HISTORY OF PRESENT ILLNESS
[FreeTextEntry1] : This is a 72-year-old woman with morbid obesity and lower extremity edema and additional comorbidities of significant cardiac history and history of 17 stents.   She has frequency urgency of urine and occasionally urge related urinary loss.  She has had marked improvement with timed voiding low-salt diet and elevation of the legs for 1 hour after dinner in an attempt to mobilize fluids in the early evening.  This appears to have reduced the number of times she has to get up at night and reduce the leakage overnight.  We discussed that with the extent of her cardiac disease I would not be comfortable giving her medications for the overactive bladder.  She is satisfied with the improvement she has had and will continue as advised.\par \par She was counseled regarding the options for medications which we agreed had to high risk, percutaneous nerve stimulation Botox and Medtronics.\par \par \par Counseling was greater than 50% of this encounter in the entire encounter was 41 minutes

## 2022-02-10 ENCOUNTER — NON-APPOINTMENT (OUTPATIENT)
Age: 72
End: 2022-02-10

## 2022-02-10 ENCOUNTER — APPOINTMENT (OUTPATIENT)
Dept: ELECTROPHYSIOLOGY | Facility: CLINIC | Age: 72
End: 2022-02-10
Payer: COMMERCIAL

## 2022-02-10 VITALS
OXYGEN SATURATION: 97 % | SYSTOLIC BLOOD PRESSURE: 149 MMHG | HEIGHT: 62 IN | DIASTOLIC BLOOD PRESSURE: 83 MMHG | HEART RATE: 64 BPM

## 2022-02-10 PROCEDURE — 93282 PRGRMG EVAL IMPLANTABLE DFB: CPT

## 2022-02-10 PROCEDURE — 93000 ELECTROCARDIOGRAM COMPLETE: CPT | Mod: 59

## 2022-05-20 ENCOUNTER — NON-APPOINTMENT (OUTPATIENT)
Age: 72
End: 2022-05-20

## 2022-05-20 ENCOUNTER — APPOINTMENT (OUTPATIENT)
Dept: ELECTROPHYSIOLOGY | Facility: CLINIC | Age: 72
End: 2022-05-20
Payer: COMMERCIAL

## 2022-05-20 PROCEDURE — 93296 REM INTERROG EVL PM/IDS: CPT

## 2022-05-20 PROCEDURE — 93295 DEV INTERROG REMOTE 1/2/MLT: CPT

## 2022-08-11 ENCOUNTER — APPOINTMENT (OUTPATIENT)
Dept: ELECTROPHYSIOLOGY | Facility: CLINIC | Age: 72
End: 2022-08-11

## 2022-08-19 ENCOUNTER — APPOINTMENT (OUTPATIENT)
Dept: ELECTROPHYSIOLOGY | Facility: CLINIC | Age: 72
End: 2022-08-19

## 2022-08-19 ENCOUNTER — NON-APPOINTMENT (OUTPATIENT)
Age: 72
End: 2022-08-19

## 2022-08-19 PROCEDURE — 93295 DEV INTERROG REMOTE 1/2/MLT: CPT

## 2022-08-19 PROCEDURE — 93296 REM INTERROG EVL PM/IDS: CPT

## 2022-11-18 ENCOUNTER — NON-APPOINTMENT (OUTPATIENT)
Age: 72
End: 2022-11-18

## 2022-11-18 ENCOUNTER — APPOINTMENT (OUTPATIENT)
Dept: ELECTROPHYSIOLOGY | Facility: CLINIC | Age: 72
End: 2022-11-18

## 2022-11-18 PROCEDURE — 93296 REM INTERROG EVL PM/IDS: CPT

## 2022-11-18 PROCEDURE — 93295 DEV INTERROG REMOTE 1/2/MLT: CPT

## 2022-12-05 NOTE — PROGRESS NOTE ADULT - ASSESSMENT
71 yo Simone and English speaking female with a PMHx of HTN, hyperlipidemia, DMII, hypothyroidism, known CAD with prior 3VCABG 2013 (LIMA-LAD, SVG-OM1, SVG-RCA (all patent by angiogram 2013), multiple PCIs most recent intervention s/p MATTHEW proximal ramus 2/2016 @ [facility], chronic systolic CHF (no recent EF documented), history of VT s/p ICD (with most recent interrogation Friday 2/7/2020) presented to [facility] ED (2/9/20) with unstable angina with admission to [facility] for cardiac catheterization with possible intervention if clinically indicated. Hpi Title: Evaluation of Skin Lesions 71 yo Simone and English speaking female with a PMHx of HTN, hyperlipidemia, DMII, hypothyroidism, known CAD with prior 3VCABG 2013 (LIMA-LAD, SVG-OM1, SVG-RCA (all patent by angiogram 2013), multiple PCIs most recent intervention s/p MATTHEW proximal ramus 2/2016 @ [facility], chronic systolic CHF (no recent EF documented), history of VT s/p ICD (with most recent interrogation Friday 2/7/2020) presented to [facility] ED (2/9/20) with unstable angina s/p diagnostic cardiac cath 12/30. D/c 13/31 pending echo results.

## 2023-02-17 ENCOUNTER — APPOINTMENT (OUTPATIENT)
Dept: ELECTROPHYSIOLOGY | Facility: CLINIC | Age: 73
End: 2023-02-17

## 2023-02-20 ENCOUNTER — FORM ENCOUNTER (OUTPATIENT)
Age: 73
End: 2023-02-20

## 2023-03-07 NOTE — ED PROVIDER NOTE - PRIOR EKG STATUS
Received  1U PRBC  Follow CBC  Avoid all anti-PLT meds or heparinoids   NS 3L Bolus in the ED    -hgb stable post-procedure     the EKG is unchanged from prior EKG

## 2023-03-20 ENCOUNTER — NON-APPOINTMENT (OUTPATIENT)
Age: 73
End: 2023-03-20

## 2023-03-21 ENCOUNTER — APPOINTMENT (OUTPATIENT)
Dept: ELECTROPHYSIOLOGY | Facility: CLINIC | Age: 73
End: 2023-03-21
Payer: COMMERCIAL

## 2023-03-21 PROCEDURE — 93296 REM INTERROG EVL PM/IDS: CPT

## 2023-03-21 PROCEDURE — 93295 DEV INTERROG REMOTE 1/2/MLT: CPT

## 2023-03-30 NOTE — ED ADULT TRIAGE NOTE - IDEAL BODY WEIGHT(KG)
Subjective:   Chief Complaint   Patient presents with   • Follow-up     Discuss weight loss  Today Pt was 257 8 lbs  Pt said last time was 275 lbs and from 2/8/23 Pt was 282 lbs  Pt could not take weight loss med phentermine  Only took for the first week of 2/16/23 due to trouble w/ urination  Pt stopped that week and was able to pee again  Has not taken since  Currently watching calories, eating healthy, not as active  Still having problem w/ pinched nerve on back  Hard w/ walking  Patient ID: Stephen Dougherty is a 58 y o  male  Here for check up  Still with back and leg pain  The following portions of the patient's history were reviewed and updated as appropriate: allergies, current medications, past family history, past medical history, past social history, past surgical history and problem list     Review of Systems   Constitutional: Negative for activity change, appetite change, chills, diaphoresis, fatigue and unexpected weight change  HENT: Negative for congestion, ear discharge, ear pain, hearing loss, nosebleeds and rhinorrhea  Eyes: Negative for pain, redness, itching and visual disturbance  Respiratory: Negative for cough, choking, chest tightness and shortness of breath  Cardiovascular: Negative for chest pain and leg swelling  Gastrointestinal: Negative for abdominal pain, blood in stool, constipation, diarrhea and nausea  Endocrine: Negative for cold intolerance, polydipsia and polyphagia  Genitourinary: Negative for dysuria, frequency, hematuria and urgency  Musculoskeletal: Positive for back pain and gait problem  Negative for arthralgias, joint swelling, neck pain and neck stiffness  Skin: Negative for color change and rash  Allergic/Immunologic: Negative for environmental allergies and food allergies  Neurological: Negative for dizziness, tremors, seizures, speech difficulty, numbness and headaches  Hematological: Negative for adenopathy   Does not "bruise/bleed easily  Psychiatric/Behavioral: Negative for behavioral problems, dysphoric mood, hallucinations and self-injury  Objective:  Vitals:    03/30/23 1407   BP: 128/84   Pulse: 58   Temp: 97 6 °F (36 4 °C)   SpO2: 96%   Weight: 117 kg (257 lb 12 8 oz)   Height: 6' 2\" (1 88 m)      Physical Exam  Constitutional:       General: He is not in acute distress  Appearance: He is well-developed  He is not diaphoretic  HENT:      Head: Normocephalic and atraumatic  Right Ear: External ear normal       Left Ear: External ear normal       Nose: Nose normal       Mouth/Throat:      Pharynx: No oropharyngeal exudate  Eyes:      General: No scleral icterus  Right eye: No discharge  Left eye: No discharge  Conjunctiva/sclera: Conjunctivae normal       Pupils: Pupils are equal, round, and reactive to light  Neck:      Thyroid: No thyromegaly  Cardiovascular:      Rate and Rhythm: Normal rate and regular rhythm  Heart sounds: Normal heart sounds  No murmur heard  Pulmonary:      Effort: Pulmonary effort is normal       Breath sounds: Normal breath sounds  No wheezing or rales  Abdominal:      General: Bowel sounds are normal       Palpations: Abdomen is soft  There is no mass  Tenderness: There is no abdominal tenderness  There is no guarding  Musculoskeletal:         General: No tenderness  Normal range of motion  Cervical back: Normal range of motion and neck supple  Lymphadenopathy:      Cervical: No cervical adenopathy  Skin:     General: Skin is warm and dry  Neurological:      Mental Status: He is alert and oriented to person, place, and time  Deep Tendon Reflexes: Reflexes are normal and symmetric  Psychiatric:         Thought Content: Thought content normal          Judgment: Judgment normal            Assessment/Plan:    No problem-specific Assessment & Plan notes found for this encounter         Diagnoses and all orders for this " visit:    Testicular hypogonadism    Hypothyroidism, unspecified type    Primary osteoarthritis of both hips    Intervertebral disc disorder with radiculopathy of lumbar region        Cont current Rx    Cont with weight loss 50

## 2023-03-30 NOTE — ED ADULT NURSE NOTE - NURSING NEURO LEVEL OF CONSCIOUSNESS
The ABCs of the Annual Wellness Visit  Subsequent Medicare Wellness Visit    Subjective    Rachell Witt is a 68 y.o. female who presents for a Subsequent Medicare Wellness Visit.    The following portions of the patient's history were reviewed and   updated as appropriate: allergies, current medications, past family history, past medical history, past social history, past surgical history and problem list.    Compared to one year ago, the patient feels her physical   health is the same.    Compared to one year ago, the patient feels her mental   health is worse.    Recent Hospitalizations:  She was not admitted to the hospital during the last year.       Current Medical Providers:  Patient Care Team:  Dilia Burch APRN as PCP - General (Family Medicine)    Outpatient Medications Prior to Visit   Medication Sig Dispense Refill   • aspirin 81 MG tablet Take 1 tablet by mouth Daily.     • atenolol (TENORMIN) 50 MG tablet TAKE ONE TABLET BY MOUTH DAILY 90 tablet 1   • atorvastatin (LIPITOR) 10 MG tablet TAKE ONE TABLET BY MOUTH EVERY NIGHT AT BEDTIME 90 tablet 1   • BIOTIN PO Take  by mouth.     • famotidine (PEPCID) 20 MG tablet Take 1 tablet by mouth Daily. 90 tablet 1   • FLUoxetine (PROzac) 10 MG capsule Take 1 capsule by mouth Daily. 90 capsule 1   • hydroCHLOROthiazide (HYDRODIURIL) 25 MG tablet Take 1 tablet by mouth Daily. 90 tablet 1   • levothyroxine (SYNTHROID, LEVOTHROID) 25 MCG tablet Take 1 tablet by mouth Daily for 90 days. Take alone on empty stomach 30 min before meds/meals 90 tablet 1   • Multiple Vitamins-Minerals (MULTIVITAMIN WOMEN 50+ PO) Take  by mouth daily.     • vitamin B-12 (CYANOCOBALAMIN) 100 MCG tablet Take 50 mcg by mouth daily.     • VITAMIN D PO Take  by mouth.       No facility-administered medications prior to visit.       No opioid medication identified on active medication list. I have reviewed chart for other potential  high risk medication/s and harmful drug interactions in  "the elderly.          Aspirin is on active medication list. Aspirin use is indicated based on review of current medical condition/s. Pros and cons of this therapy have been discussed today. Benefits of this medication outweigh potential harm.  Patient has been encouraged to continue taking this medication.  .      Patient Active Problem List   Diagnosis   • Acute bronchitis with bronchospasm   • Steatosis of liver   • Gastritis   • Headache   • Hyperlipidemia   • Hypertension   • Hypothyroidism   • Obesity, diabetes, and hypertension syndrome (HCC)   • Medial epicondylitis of elbow   • Abnormal electrocardiogram   • Vitamin D deficiency   • Bilateral carpal tunnel syndrome   • Screening for cardiovascular condition     Advance Care Planning  Advance Directive is not on file.  ACP discussion was held with the patient during this visit. Patient does not have an advance directive, information provided.     Objective    Vitals:    03/30/23 0952   BP: 120/70   Pulse: 53   Resp: 20   SpO2: 98%   Weight: 80.3 kg (177 lb)   Height: 157.5 cm (62\")     Estimated body mass index is 32.37 kg/m² as calculated from the following:    Height as of this encounter: 157.5 cm (62\").    Weight as of this encounter: 80.3 kg (177 lb).    BMI is >= 30 and <35. (Class 1 Obesity). The following options were offered after discussion;: weight loss educational material (shared in after visit summary), exercise counseling/recommendations and nutrition counseling/recommendations      Does the patient have evidence of cognitive impairment? No          HEALTH RISK ASSESSMENT    Smoking Status:  Social History     Tobacco Use   Smoking Status Never   Smokeless Tobacco Never     Alcohol Consumption:  Social History     Substance and Sexual Activity   Alcohol Use Not Currently    Comment: Occasionally     Fall Risk Screen:    NELA Fall Risk Assessment was completed, and patient is at LOW risk for falls.Assessment completed on:3/30/2023    Depression " Screening:  PHQ-2/PHQ-9 Depression Screening 3/30/2023   Little Interest or Pleasure in Doing Things 0-->not at all   Feeling Down, Depressed or Hopeless 0-->not at all   PHQ-9: Brief Depression Severity Measure Score 0       Health Habits and Functional and Cognitive Screening:  Functional & Cognitive Status 3/30/2023   Do you have difficulty preparing food and eating? No   Do you have difficulty bathing yourself, getting dressed or grooming yourself? No   Do you have difficulty using the toilet? No   Do you have difficulty moving around from place to place? No   Do you have trouble with steps or getting out of a bed or a chair? No   Current Diet Well Balanced Diet   Dental Exam Up to date   Eye Exam Up to date   Exercise (times per week) 2 times per week   Current Exercises Include Aerobics   Do you need help using the phone?  No   Are you deaf or do you have serious difficulty hearing?  No   Do you need help with transportation? No   Do you need help shopping? No   Do you need help preparing meals?  No   Do you need help with housework?  No   Do you need help with laundry? No   Do you need help taking your medications? No   Do you need help managing money? No   Do you ever drive or ride in a car without wearing a seat belt? No   Have you felt unusual stress, anger or loneliness in the last month? No   Who do you live with? Child   If you need help, do you have trouble finding someone available to you? No   Have you been bothered in the last four weeks by sexual problems? No   Do you have difficulty concentrating, remembering or making decisions? No       Age-appropriate Screening Schedule:  Refer to the list below for future screening recommendations based on patient's age, sex and/or medical conditions. Orders for these recommended tests are listed in the plan section. The patient has been provided with a written plan.    Health Maintenance   Topic Date Due   • URINE MICROALBUMIN  01/31/2018   • COLORECTAL  CANCER SCREENING  02/08/2023   • DXA SCAN  03/31/2023 (Originally 1954)   • HEMOGLOBIN A1C  06/29/2023   • DIABETIC EYE EXAM  06/30/2023   • LIPID PANEL  12/29/2023   • ANNUAL WELLNESS VISIT  03/30/2024   • DIABETIC FOOT EXAM  03/30/2024   • TDAP/TD VACCINES (2 - Td or Tdap) 01/01/2025   • MAMMOGRAM  02/21/2025   • HEPATITIS C SCREENING  Completed   • COVID-19 Vaccine  Completed   • INFLUENZA VACCINE  Completed   • Pneumococcal Vaccine 65+  Completed   • ZOSTER VACCINE  Completed   • PAP SMEAR  Discontinued                CMS Preventative Services Quick Reference  Risk Factors Identified During Encounter  Depression/Dysphoria: Current medication is effective, no change recommended  Dental Screening Recommended  Vision Screening Recommended  The above risks/problems have been discussed with the patient.  Pertinent information has been shared with the patient in the After Visit Summary.  An After Visit Summary and PPPS were made available to the patient.    Follow Up:   Next Medicare Wellness visit to be scheduled in 1 year.       Additional E&M Note during same encounter follows:  Patient has multiple medical problems which are significant and separately identifiable that require additional work above and beyond the Medicare Wellness Visit.      Chief Complaint  Annual Exam    Subjective        HPI  Rachell Witt is also being seen today for acute grief and anxiety/depression. Recent death of  last week from CHF. She is on fluoxetine 10 mg. She has been in higher doses in the past and felt too numbed out. She is going to bed early and has freq waking. She is going to try joining grief group.  She is not sure if she wants to have referral to therapist she would like to look for her own.  She does not want medication for anxiety or insomnia at this time    Chronic hypothyroidsm x years. Check TSh., cw levothyroxine 25mcg alone on empty stomach 30 min before meds/meals   Chronic DM2. Last today. Last a1c  "12/29/22 6. Due foot exam today.  Encourage ADA diet.  Walk daily, limit highly refined carbohydrates and sweets    Recent positive Cologuard.  Patient does take ASA 81 mg daily per cardiology.  She is aware that she could have positive Cologuard due to aspirin use.  She has orders to have colonoscopy     Review of Systems   Constitutional: Positive for appetite change.   HENT: Negative for congestion.         Dental exam is utd     Eyes: Negative for visual disturbance.        Eyeglasses, eye exam is utd      Respiratory: Negative for cough, shortness of breath and wheezing.    Cardiovascular: Negative for chest pain, palpitations and leg swelling.   Gastrointestinal: Negative for constipation and diarrhea.   Endocrine: Negative for cold intolerance and heat intolerance.   Genitourinary: Negative for dysuria.   Musculoskeletal: Negative for arthralgias and back pain.   Skin: Negative for wound.   Allergic/Immunologic: Negative for environmental allergies.   Hematological: Does not bruise/bleed easily.   Psychiatric/Behavioral: Positive for dysphoric mood and sleep disturbance. Negative for suicidal ideas. The patient is nervous/anxious.        Objective   Vital Signs:  /70   Pulse 53   Resp 20   Ht 157.5 cm (62\")   Wt 80.3 kg (177 lb)   SpO2 98%   BMI 32.37 kg/m²     Physical Exam  Vitals reviewed.   Constitutional:       Appearance: Normal appearance. She is obese.   HENT:      Head: Normocephalic.      Right Ear: Tympanic membrane normal.      Left Ear: Tympanic membrane normal.      Nose: Nose normal.      Mouth/Throat:      Mouth: Mucous membranes are moist.   Eyes:      Pupils: Pupils are equal, round, and reactive to light.   Cardiovascular:      Rate and Rhythm: Normal rate and regular rhythm.      Pulses: Normal pulses.           Dorsalis pedis pulses are 2+ on the right side and 2+ on the left side.        Posterior tibial pulses are 2+ on the right side and 2+ on the left side.      Heart " sounds: Normal heart sounds.   Pulmonary:      Effort: Pulmonary effort is normal.      Breath sounds: Normal breath sounds.   Abdominal:      General: Bowel sounds are normal.      Palpations: Abdomen is soft.   Musculoskeletal:         General: Normal range of motion.      Cervical back: Normal range of motion.   Feet:      Right foot:      Protective Sensation: 4 sites tested. 4 sites sensed.      Skin integrity: No callus.      Left foot:      Protective Sensation: 4 sites tested. 4 sites sensed.      Skin integrity: No callus.   Skin:     General: Skin is warm.   Neurological:      General: No focal deficit present.      Mental Status: She is alert.   Psychiatric:         Mood and Affect: Mood is depressed. Affect is tearful.                         Assessment and Plan   Diagnoses and all orders for this visit:    1. Encounter for subsequent annual wellness visit (AWV) in Medicare patient (Primary)    2. Grief    3. Mixed hyperlipidemia  -     Lipid Panel With / Chol / HDL Ratio    4. Primary hypertension  -     CBC & Differential    5. Hypothyroidism, unspecified type    6. Obesity, diabetes, and hypertension syndrome (HCC)  -     Comprehensive Metabolic Panel  -     Hemoglobin A1c  -     Microalbumin / Creatinine Urine Ratio - Urine, Clean Catch    Grief- walk, healthy diet, look into grief/ group, call for worsening, cw fluoxetine 10 mg qd    DM2- check labs, follow ADA diet       Follow Up   Return in about 1 year (around 3/30/2024) for Annual physical, Medicare Wellness.  Patient was given instructions and counseling regarding her condition or for health maintenance advice. Please see specific information pulled into the AVS if appropriate.          alert and awake/follows commands

## 2023-06-20 ENCOUNTER — APPOINTMENT (OUTPATIENT)
Dept: ELECTROPHYSIOLOGY | Facility: CLINIC | Age: 73
End: 2023-06-20

## 2023-06-20 ENCOUNTER — FORM ENCOUNTER (OUTPATIENT)
Age: 73
End: 2023-06-20

## 2023-06-29 ENCOUNTER — APPOINTMENT (OUTPATIENT)
Dept: ELECTROPHYSIOLOGY | Facility: CLINIC | Age: 73
End: 2023-06-29
Payer: COMMERCIAL

## 2023-06-29 ENCOUNTER — NON-APPOINTMENT (OUTPATIENT)
Age: 73
End: 2023-06-29

## 2023-06-29 PROCEDURE — 93295 DEV INTERROG REMOTE 1/2/MLT: CPT

## 2023-06-29 PROCEDURE — 93296 REM INTERROG EVL PM/IDS: CPT

## 2023-08-09 ENCOUNTER — RESULT CHARGE (OUTPATIENT)
Age: 73
End: 2023-08-09

## 2023-08-10 ENCOUNTER — NON-APPOINTMENT (OUTPATIENT)
Age: 73
End: 2023-08-10

## 2023-08-10 ENCOUNTER — APPOINTMENT (OUTPATIENT)
Dept: ELECTROPHYSIOLOGY | Facility: CLINIC | Age: 73
End: 2023-08-10
Payer: COMMERCIAL

## 2023-08-10 VITALS
BODY MASS INDEX: 40.48 KG/M2 | OXYGEN SATURATION: 98 % | SYSTOLIC BLOOD PRESSURE: 124 MMHG | DIASTOLIC BLOOD PRESSURE: 77 MMHG | HEART RATE: 78 BPM | HEIGHT: 62 IN | WEIGHT: 220 LBS

## 2023-08-10 PROCEDURE — 93282 PRGRMG EVAL IMPLANTABLE DFB: CPT

## 2023-08-10 PROCEDURE — 93000 ELECTROCARDIOGRAM COMPLETE: CPT | Mod: 59

## 2023-11-09 ENCOUNTER — NON-APPOINTMENT (OUTPATIENT)
Age: 73
End: 2023-11-09

## 2023-11-09 ENCOUNTER — APPOINTMENT (OUTPATIENT)
Dept: ELECTROPHYSIOLOGY | Facility: CLINIC | Age: 73
End: 2023-11-09
Payer: COMMERCIAL

## 2023-11-09 PROCEDURE — 93296 REM INTERROG EVL PM/IDS: CPT

## 2023-11-09 PROCEDURE — 93295 DEV INTERROG REMOTE 1/2/MLT: CPT

## 2024-02-07 ENCOUNTER — NON-APPOINTMENT (OUTPATIENT)
Age: 74
End: 2024-02-07

## 2024-02-08 ENCOUNTER — APPOINTMENT (OUTPATIENT)
Dept: ELECTROPHYSIOLOGY | Facility: CLINIC | Age: 74
End: 2024-02-08
Payer: MEDICARE

## 2024-02-08 PROCEDURE — 93295 DEV INTERROG REMOTE 1/2/MLT: CPT

## 2024-02-08 PROCEDURE — 93296 REM INTERROG EVL PM/IDS: CPT

## 2024-03-21 NOTE — PATIENT PROFILE ADULT - NSPROMEDSBROUGHTTOHOSP_GEN_A_NUR
Assumed care of pt here under PEC order for depression and anxiety. Pt is not speaking. Pt's father at bedside and thinks pt may be self-medicating. Pt will not answer suicide risk assessment questions but is cooperative and calm. Pt now medically cleared. ABCs intact, NAD. VSS.     Review of patient's allergies indicates:  No Known Allergies     Patient father has verified the spelling of pt's name and  on armband. Pt also nodding head yes to these questions.   APPEARANCE: Patient is alert, calm,and does not appear distressed.  SKIN: Skin is normal for race, warm, and dry. Normal skin turgor and mucous membranes moist.  CARDIAC: Normal rate and rhythm, no murmur heard.   RESPIRATORY:Normal rate and effort. Breath sounds clear bilaterally throughout chest. Respirations are equal and unlabored.    GASTRO: Bowel sounds normal, abdomen is soft, no tenderness, and no abdominal distention.  MUSCLE: Full ROM. No bony tenderness or soft tissue tenderness. No obvious deformity.  PERIPHERAL VASCULAR: peripheral pulses present. Normal cap refill. No edema. Warm to touch.  NEURO: 5/5 strength major flexors/extensors bilaterally. Sensory intact to light touch bilaterally. Stratford coma scale: eyes open spontaneously-4, oriented & converses-5, obeys commands-6. No neurological abnormalities.   MENTAL STATUS: awake, alert and aware of environment..  : Voids without complication     yes

## 2024-03-29 NOTE — ED ADULT TRIAGE NOTE - ESI TRIAGE ACUITY LEVEL, MLM
Patient ID: [id number]     

## 2024-04-05 ENCOUNTER — APPOINTMENT (OUTPATIENT)
Dept: ELECTROPHYSIOLOGY | Facility: CLINIC | Age: 74
End: 2024-04-05

## 2024-04-19 NOTE — PATIENT PROFILE ADULT - FALL HARM RISK TYPE OF ASSESSMENT
admission
# Aortic stenosis and history of valve replacement  -aspirin     #HTN  -Norvasc    #DM  -Januvia    #HLD  -Atorvastatin    #Thyroid  -Synthroid

## 2024-05-14 ENCOUNTER — TRANSCRIPTION ENCOUNTER (OUTPATIENT)
Age: 74
End: 2024-05-14

## 2024-05-14 ENCOUNTER — APPOINTMENT (OUTPATIENT)
Dept: ORTHOPEDIC SURGERY | Facility: CLINIC | Age: 74
End: 2024-05-14
Payer: MEDICARE

## 2024-05-14 VITALS — BODY MASS INDEX: 36.37 KG/M2 | HEIGHT: 64 IN | WEIGHT: 213 LBS

## 2024-05-14 DIAGNOSIS — M17.12 UNILATERAL PRIMARY OSTEOARTHRITIS, LEFT KNEE: ICD-10-CM

## 2024-05-14 PROCEDURE — 99203 OFFICE O/P NEW LOW 30 MIN: CPT | Mod: 25

## 2024-05-14 PROCEDURE — 73562 X-RAY EXAM OF KNEE 3: CPT | Mod: LT

## 2024-05-29 RX ORDER — ISOSORBIDE DINITRATE 5 MG/1
1 TABLET ORAL
Refills: 0 | DISCHARGE
Start: 2024-05-29

## 2024-06-08 ENCOUNTER — INPATIENT (INPATIENT)
Facility: HOSPITAL | Age: 74
LOS: 3 days | Discharge: HOME HEALTH | End: 2024-06-12
Attending: STUDENT IN AN ORGANIZED HEALTH CARE EDUCATION/TRAINING PROGRAM | Admitting: STUDENT IN AN ORGANIZED HEALTH CARE EDUCATION/TRAINING PROGRAM
Payer: MEDICARE

## 2024-06-08 VITALS
DIASTOLIC BLOOD PRESSURE: 103 MMHG | RESPIRATION RATE: 18 BRPM | WEIGHT: 199.96 LBS | SYSTOLIC BLOOD PRESSURE: 173 MMHG | TEMPERATURE: 97 F | HEIGHT: 62 IN | HEART RATE: 149 BPM | OXYGEN SATURATION: 98 %

## 2024-06-08 DIAGNOSIS — Z95.5 PRESENCE OF CORONARY ANGIOPLASTY IMPLANT AND GRAFT: Chronic | ICD-10-CM

## 2024-06-08 DIAGNOSIS — Z95.1 PRESENCE OF AORTOCORONARY BYPASS GRAFT: Chronic | ICD-10-CM

## 2024-06-08 LAB
ADD ON TEST-SPECIMEN IN LAB: SIGNIFICANT CHANGE UP
ALBUMIN SERPL ELPH-MCNC: 4.2 G/DL — SIGNIFICANT CHANGE UP (ref 3.3–5)
ALP SERPL-CCNC: 89 U/L — SIGNIFICANT CHANGE UP (ref 40–120)
ALT FLD-CCNC: 22 U/L — SIGNIFICANT CHANGE UP (ref 4–33)
ANION GAP SERPL CALC-SCNC: 14 MMOL/L — SIGNIFICANT CHANGE UP (ref 7–14)
APTT BLD: 29.8 SEC — SIGNIFICANT CHANGE UP (ref 24.5–35.6)
AST SERPL-CCNC: 25 U/L — SIGNIFICANT CHANGE UP (ref 4–32)
BASOPHILS # BLD AUTO: 0.04 K/UL — SIGNIFICANT CHANGE UP (ref 0–0.2)
BASOPHILS NFR BLD AUTO: 0.4 % — SIGNIFICANT CHANGE UP (ref 0–2)
BILIRUB SERPL-MCNC: 0.4 MG/DL — SIGNIFICANT CHANGE UP (ref 0.2–1.2)
BUN SERPL-MCNC: 21 MG/DL — SIGNIFICANT CHANGE UP (ref 7–23)
CALCIUM SERPL-MCNC: 10.1 MG/DL — SIGNIFICANT CHANGE UP (ref 8.4–10.5)
CHLORIDE SERPL-SCNC: 102 MMOL/L — SIGNIFICANT CHANGE UP (ref 98–107)
CO2 SERPL-SCNC: 22 MMOL/L — SIGNIFICANT CHANGE UP (ref 22–31)
CREAT SERPL-MCNC: 1.13 MG/DL — SIGNIFICANT CHANGE UP (ref 0.5–1.3)
EGFR: 51 ML/MIN/1.73M2 — LOW
EOSINOPHIL # BLD AUTO: 0.46 K/UL — SIGNIFICANT CHANGE UP (ref 0–0.5)
EOSINOPHIL NFR BLD AUTO: 4.9 % — SIGNIFICANT CHANGE UP (ref 0–6)
GLUCOSE SERPL-MCNC: 153 MG/DL — HIGH (ref 70–99)
HCT VFR BLD CALC: 40.7 % — SIGNIFICANT CHANGE UP (ref 34.5–45)
HGB BLD-MCNC: 14 G/DL — SIGNIFICANT CHANGE UP (ref 11.5–15.5)
IANC: 5.58 K/UL — SIGNIFICANT CHANGE UP (ref 1.8–7.4)
IMM GRANULOCYTES NFR BLD AUTO: 0.3 % — SIGNIFICANT CHANGE UP (ref 0–0.9)
INR BLD: <0.9 RATIO — SIGNIFICANT CHANGE UP (ref 0.85–1.18)
LYMPHOCYTES # BLD AUTO: 2.54 K/UL — SIGNIFICANT CHANGE UP (ref 1–3.3)
LYMPHOCYTES # BLD AUTO: 27 % — SIGNIFICANT CHANGE UP (ref 13–44)
MAGNESIUM SERPL-MCNC: 2.3 MG/DL — SIGNIFICANT CHANGE UP (ref 1.6–2.6)
MCHC RBC-ENTMCNC: 29.5 PG — SIGNIFICANT CHANGE UP (ref 27–34)
MCHC RBC-ENTMCNC: 34.4 GM/DL — SIGNIFICANT CHANGE UP (ref 32–36)
MCV RBC AUTO: 85.7 FL — SIGNIFICANT CHANGE UP (ref 80–100)
MONOCYTES # BLD AUTO: 0.77 K/UL — SIGNIFICANT CHANGE UP (ref 0–0.9)
MONOCYTES NFR BLD AUTO: 8.2 % — SIGNIFICANT CHANGE UP (ref 2–14)
NEUTROPHILS # BLD AUTO: 5.58 K/UL — SIGNIFICANT CHANGE UP (ref 1.8–7.4)
NEUTROPHILS NFR BLD AUTO: 59.2 % — SIGNIFICANT CHANGE UP (ref 43–77)
NRBC # BLD: 0 /100 WBCS — SIGNIFICANT CHANGE UP (ref 0–0)
NRBC # FLD: 0 K/UL — SIGNIFICANT CHANGE UP (ref 0–0)
PLATELET # BLD AUTO: 245 K/UL — SIGNIFICANT CHANGE UP (ref 150–400)
POTASSIUM SERPL-MCNC: 4.3 MMOL/L — SIGNIFICANT CHANGE UP (ref 3.5–5.3)
POTASSIUM SERPL-SCNC: 4.3 MMOL/L — SIGNIFICANT CHANGE UP (ref 3.5–5.3)
PROT SERPL-MCNC: 7.8 G/DL — SIGNIFICANT CHANGE UP (ref 6–8.3)
PROTHROM AB SERPL-ACNC: 10 SEC — SIGNIFICANT CHANGE UP (ref 9.5–13)
RBC # BLD: 4.75 M/UL — SIGNIFICANT CHANGE UP (ref 3.8–5.2)
RBC # FLD: 14.2 % — SIGNIFICANT CHANGE UP (ref 10.3–14.5)
SODIUM SERPL-SCNC: 138 MMOL/L — SIGNIFICANT CHANGE UP (ref 135–145)
TROPONIN T, HIGH SENSITIVITY RESULT: 100 NG/L — CRITICAL HIGH
TROPONIN T, HIGH SENSITIVITY RESULT: 83 NG/L — CRITICAL HIGH
WBC # BLD: 9.42 K/UL — SIGNIFICANT CHANGE UP (ref 3.8–10.5)
WBC # FLD AUTO: 9.42 K/UL — SIGNIFICANT CHANGE UP (ref 3.8–10.5)

## 2024-06-08 PROCEDURE — 71275 CT ANGIOGRAPHY CHEST: CPT | Mod: 26,MC

## 2024-06-08 PROCEDURE — 71045 X-RAY EXAM CHEST 1 VIEW: CPT | Mod: 26

## 2024-06-08 PROCEDURE — 99285 EMERGENCY DEPT VISIT HI MDM: CPT

## 2024-06-08 RX ORDER — MORPHINE SULFATE 50 MG/1
4 CAPSULE, EXTENDED RELEASE ORAL ONCE
Refills: 0 | Status: DISCONTINUED | OUTPATIENT
Start: 2024-06-08 | End: 2024-06-08

## 2024-06-08 RX ORDER — ASPIRIN/CALCIUM CARB/MAGNESIUM 324 MG
243 TABLET ORAL ONCE
Refills: 0 | Status: COMPLETED | OUTPATIENT
Start: 2024-06-08 | End: 2024-06-08

## 2024-06-08 RX ORDER — HEPARIN SODIUM 5000 [USP'U]/ML
4000 INJECTION INTRAVENOUS; SUBCUTANEOUS EVERY 6 HOURS
Refills: 0 | Status: DISCONTINUED | OUTPATIENT
Start: 2024-06-08 | End: 2024-06-10

## 2024-06-08 RX ORDER — HEPARIN SODIUM 5000 [USP'U]/ML
4000 INJECTION INTRAVENOUS; SUBCUTANEOUS ONCE
Refills: 0 | Status: COMPLETED | OUTPATIENT
Start: 2024-06-08 | End: 2024-06-08

## 2024-06-08 RX ORDER — HEPARIN SODIUM 5000 [USP'U]/ML
INJECTION INTRAVENOUS; SUBCUTANEOUS
Qty: 25000 | Refills: 0 | Status: DISCONTINUED | OUTPATIENT
Start: 2024-06-08 | End: 2024-06-09

## 2024-06-08 RX ADMIN — MORPHINE SULFATE 4 MILLIGRAM(S): 50 CAPSULE, EXTENDED RELEASE ORAL at 20:21

## 2024-06-08 RX ADMIN — Medication 243 MILLIGRAM(S): at 18:36

## 2024-06-08 RX ADMIN — HEPARIN SODIUM 4000 UNIT(S): 5000 INJECTION INTRAVENOUS; SUBCUTANEOUS at 20:04

## 2024-06-08 RX ADMIN — MORPHINE SULFATE 4 MILLIGRAM(S): 50 CAPSULE, EXTENDED RELEASE ORAL at 18:44

## 2024-06-08 RX ADMIN — HEPARIN SODIUM 1000 UNIT(S)/HR: 5000 INJECTION INTRAVENOUS; SUBCUTANEOUS at 20:03

## 2024-06-08 NOTE — ED ADULT NURSE NOTE - PAIN RATING/NUMBER SCALE (0-10): ACTIVITY
[FreeTextEntry1] : 64-year-old with history of recent emergency room visit for right renal colic. CT scan showed a 5 mm right UVJ stone along with a nonobstructing right 4 mm stone. He also has multiple nonobstructing stones of the 0.6 mm on the left with evidence of lower pole partial nephrectomy. Urine culture was negative.He was discharged on Flomax and has had only mild discomfort yesterday.\par \par He has a history of BPH and currently has nocturia x2, occasional weak stream and urgency with daytime frequency and intermittency that he Attributes to large water intake for stone passage
2 (mild pain)

## 2024-06-08 NOTE — ED ADULT NURSE NOTE - NSFALLHARMRISKINTERV_ED_ALL_ED
Assistance OOB with selected safe patient handling equipment if applicable/Assistance with ambulation/Communicate risk of Fall with Harm to all staff, patient, and family/Monitor gait and stability/Provide visual cue: red socks, yellow wristband, yellow gown, etc/Reinforce activity limits and safety measures with patient and family/Bed in lowest position, wheels locked, appropriate side rails in place/Call bell, personal items and telephone in reach/Instruct patient to call for assistance before getting out of bed/chair/stretcher/Non-slip footwear applied when patient is off stretcher/[place] to call system/Physically safe environment - no spills, clutter or unnecessary equipment/Purposeful Proactive Rounding/Room/bathroom lighting operational, light cord in reach

## 2024-06-08 NOTE — CONSULT NOTE ADULT - ASSESSMENT
Patient is a 74 year old female with PMHx known CAD with prior 3VCABG 2013 (LIMA-LAD, SVG-OM1, SVG-RCA (all patent by angiogram 2013), multiple PCIs most recent intervention s/p MATTHEW proximal ramus 2/2016 @ [facility], chronic systolic CHF, history of VT s/p ICD, recent heart attack in April (managed at [facility]), HTN, HLD, DM2 who presents to the [facility] emergency department with chief complaint of 2 episodes of 10/10 crushing substernal chest pain with radiation to the left upper extremity starting at 0400 on day of arrival 6/8, second episode around 1400 on day of arrival 6/8/24. Cardiology consulted for concern for STEMI and ?WCT to HR 140s on ED arrival, of which interventionalist on call Dr. Mujica was informed of EKG and deemed patient EKG does not meet STEMI criteria and does not require urgent LHC overnight. Patient with nondynamic EKGs in ED, endorsing improvement in chest pain, BP stable and mentating well, remains tachycardic to HR 120s-low 140s and has not yet received medications in ED for rate control. Troponin elevation likely demand in setting of known CAD, pending CK/CKMB/CPK and pending proBNP. Given overall HD stability, patient does not require urgent LHC overnight, pending further discussion with cardiology attending for role of ischemic evaluation this admission. No role at this time for reloading with Plavix.     PLAN:  #C/F STEMI  - Recommend admission to telemetry for continuous monitoring of arrhythmia  - s/p  mg x1 in ED and has yet to receive medication for rate control  - No role for reloading patient with Plavix   - Initiated on heparin gtt in ED, can continue Heparin for medical management of CAD  - Continue home ASA 81 mg, Plavix 75 mg, Lipitor 80 mg instead of 40 mg  - Please obtain risk stratification labs including A1C, TSH, lipid profile, proBNP  - Recommend obtainig TTE in AM for further assessment of LV function and rule out WMA  - Please hold home CCB for now iso assessment of LV function and with history of CHF   - Recommend starting patient on Lopressor 25 mg TID for rate control with hold SBP<100 and HR<60, if with inadequate response can consider uptitrating to Lopressor 25 mg q6h  - Pending further discussion with attending regarding role for ischemic evaluation this admission, however does not require LHC overnight given current HD stability  - Can consider EP consult in AM for full interrogation of [facility] [employer] ICD  - Continue ongoing symptomatic management and workup per primary medicine team        Case discussed with cardiology fellow Dr. Ariel Erwin and on call interventionalist Dr. Felix Mujica   Should patient HD status change, please call cardiology at [phone] once again.  Note Incomplete, Attending attestation to follow for final recommendations         Patient is a 74 year old female with PMHx known CAD with prior 3VCABG 2013 (LIMA-LAD, SVG-OM1, SVG-RCA (all patent by angiogram 2013), multiple PCIs most recent intervention s/p MATTHEW proximal ramus 2/2016 @ [facility], chronic systolic CHF, history of VT s/p ICD, recent heart attack in April (managed at [facility]), HTN, HLD, DM2 who presents to the [facility] emergency department with chief complaint of 2 episodes of 10/10 crushing substernal chest pain with radiation to the left upper extremity starting at 0400 on day of arrival 6/8, second episode around 1400 on day of arrival 6/8/24. Cardiology consulted for concern for STEMI and ?WCT to HR 140s on ED arrival, of which interventionalist on call Dr. Mujica was informed of EKG and deemed patient EKG does not meet STEMI criteria and does not require urgent LHC overnight. Patient with nondynamic EKGs in ED, endorsing improvement in chest pain, BP stable and mentating well, remains tachycardic to HR 120s-low 140s and has not yet received medications in ED for rate control. Troponin elevation likely demand in setting of known CAD, pending CK/CKMB/CPK and pending proBNP. Given overall HD stability, patient does not require urgent LHC overnight, pending further discussion with cardiology attending for role of ischemic evaluation this admission. No role at this time for reloading with Plavix.     PLAN:  #C/F STEMI  - Recommend admission to telemetry for continuous monitoring of arrhythmia  - s/p  mg x1 in ED and has yet to receive medication for rate control  - No role for reloading patient with Plavix   - Initiated on heparin gtt in ED, can continue Heparin for medical management of CAD  - Continue home ASA 81 mg, Plavix 75 mg, Lipitor 80 mg instead of 40 mg  - Please obtain risk stratification labs including A1C, TSH, lipid profile, proBNP  - Recommend obtainig TTE in AM for further assessment of LV function and rule out WMA  - Please hold home CCB for now iso assessment of LV function and with history of CHF   - Recommend starting patient on Lopressor 25 mg TID for rate control with hold SBP<100 and HR<60, if with inadequate response can consider uptitrating to Lopressor 25 mg q6h  - Hold home Coreg for now, hold ACEi/ARB for now pending ?ischemic eval this admission  - Pending further discussion with attending regarding role for ischemic evaluation this admission, however does not require LHC overnight given current HD stability  - Can consider EP consult in AM for full interrogation of [employer] ICD  - Continue ongoing symptomatic management and workup per primary medicine team        Case discussed with cardiology fellow Dr. Ariel Erwin and on call interventionalist Dr. Felix Mujica   Should patient HD status change, please call cardiology at #[phone] once again.  Note Incomplete, Attending attestation to follow for final recommendations         Patient is a 74 year old female with PMHx known CAD with prior 3VCABG 2013 (LIMA-LAD, SVG-OM1, SVG-RCA (all patent by angiogram 2013), multiple PCIs most recent intervention s/p MATTHEW proximal ramus 2/2016 @ [facility], chronic systolic CHF, history of VT s/p ICD, recent heart attack in April (managed at [facility]), HTN, HLD, DM2 who presents to the [facility] emergency department with chief complaint of 2 episodes of 10/10 crushing substernal chest pain with radiation to the left upper extremity starting at 0400 on day of arrival 6/8, second episode around 1400 on day of arrival 6/8/24. Cardiology consulted for concern for STEMI and ?WCT to HR 140s on ED arrival, of which interventionalist on call Dr. Mujica was informed of EKG and deemed patient EKG does not meet STEMI criteria and does not require urgent LHC overnight. Patient with nondynamic EKGs in ED, endorsing improvement in chest pain, BP stable and mentating well, remains tachycardic to HR 120s-low 140s and has not yet received medications in ED for rate control. Troponin elevation likely demand in setting of known CAD, pending CK/CKMB/CPK and pending proBNP. Given overall HD stability, patient does not require urgent LHC overnight, pending further discussion with cardiology attending for role of ischemic evaluation this admission. No role at this time for reloading with Plavix.     PLAN:  #C/F STEMI  - Recommend admission to telemetry for continuous monitoring of arrhythmia  - s/p  mg x1 in ED and has yet to receive medication for rate control  - No role for reloading patient with Plavix   - Initiated on heparin gtt in ED, can continue Heparin for medical management of CAD  - Continue home ASA 81 mg, Plavix 75 mg, Lipitor 80 mg instead of 40 mg  - Please obtain risk stratification labs including A1C, TSH, lipid profile, proBNP  - Recommend obtainig TTE in AM for further assessment of LV function and rule out WMA  - Please hold home CCB for now iso assessment of LV function and with history of CHF   - Recommend starting patient on Lopressor 25 mg TID for rate control with hold SBP<100 and HR<60, if with inadequate response can consider uptitrating to Lopressor 25 mg q6h  - Hold home Coreg for now, hold ACEi/ARB for now pending ?ischemic eval this admission  - Pending further discussion with attending regarding role for ischemic evaluation this admission, however does not require LHC overnight given current HD stability  - Can consider EP consult in AM for full interrogation of [facility] [employer] ICD                                                       ?questionable Atach or AFL on tele no reported prior hx AFib, CHADsVASc 4-5 based on age, sex, CAD history and h/o DM2 continue heparin gtt  - Continue ongoing symptomatic management and workup per primary medicine team        Case discussed with cardiology fellow Dr. Ariel Erwin and on call interventionalist Dr. Felix Mujica   Should patient HD status change, please call cardiology at [phone] once again.  Note Incomplete, Attending attestation to follow for final recommendations

## 2024-06-08 NOTE — CONSULT NOTE ADULT - CRITICAL CARE ATTENDING COMMENT
Tiburcio Caro is a 74-year-old woman with history of CAD s/p 3VCABG 2013 (LIMA-LAD, SVG-OM1, SVG-RCA, multiple PCIs most recent MATTHEW proximal ramus 2/2016 @ [facility], chronic systolic CHF, VT s/p ICD, HTN, HLD and DM2. She presented to ED with 2 episodes crushing substernal CP, radiation to the left upper extremity starting at 04:00 on 6/8/24, second episode around 14:00. ECG noted WCT to HR 140s on ED arrival.  ECG did not meet STEMI criteria. She received   mg x1 in ED and heparin gtt . Current management to maintain ASA 81 mg, Plavix 75 mg, Lipitor 80 mg daily. TTE will be done for further assessment of segmental LV wall motion and overall LV function. Start metoprolol tartrate (Lopressor) 25 mg oral TID for rate control with hold SBP<100 and HR<60, if with inadequate response may up titrate Lopressor to 25 mg q6h. Hold home Coreg for now, hold ACEi/ARB for now. Plan for interrogation of [employer] ICD with review of rhytym for questionable Atach / AFlut suspected one tele. No known prior hx AFib, CHADsVASc 4-5 based on age, sex, CAD history and h/o DM2 continue heparin gtt.

## 2024-06-08 NOTE — ED ADULT NURSE NOTE - OBJECTIVE STATEMENT
Received pt. in ER room 13 alert and oriented x 4, presenting to the ER with complaints of left chest pain. Pt. has a history of CABG and Aicd. Pt. is accompanied by her grandson Eliseo, pt. is sobbing and holding her chest. Placed on cardiac monitor, labs sent, medicated as ordered with morphine 4mg ivp, and chewable aspirins 81mg x 3. No c/o shortness of breath, no diaphoresis , call bell place within reach.

## 2024-06-08 NOTE — ED PROVIDER NOTE - PHYSICAL EXAMINATION
General: very uncomfortable  Psych: mood appropriate  Head: normocephalic; atraumatic  Eyes: conjunctivae clear bilaterally, sclerae anicteric  ENT: no nasal flaring, patent nares  Cardio: tachycardic; no m/r/g, pulses 2+ b/l  Resp: CATB, no w/r/r  GI: soft/nondistended/nontender  Neuro: normal sensation, moving all four extremities equally  Skin: No evidence of rash or bruising  MSK: normal movement of all extremities  Lymph/Vasc: no LE edema

## 2024-06-08 NOTE — ED PROVIDER NOTE - CLINICAL SUMMARY MEDICAL DECISION MAKING FREE TEXT BOX
The patient's risk factors for ACS were reviewed as well as the initial EKG which is concerning for an ST-elevated myocardial infarction due to ST elevation in V1-V2 with a new QRS widening.    A code STEMI was activated, case was discussed with Dr. Mujica.  He does not recommend that patient go immediately to Cath Lab, would like the cardiology team to assess patient as well as wait for the result of the troponin.  EKG was sent to the [email or web address] email address and directly to attending. Pending additional recommendations at this time.    A chest x-ray has been ordered to exclude pneumonia, pneumothorax, or esophageal tears.  The patient does not require CT angiogram to rule out a pulmonary embolism based on the Wells Score and/or PERC rule. There are no concerning features of history of exam that are strongly suggestive of pericarditis, endocarditis, or myocarditis. There is no fever.  There does not appear to be an aortic dissection either based on history or physical exam. Will obtain serial EKG's as indicated for evolving symptoms, an initial troponin, maintain on cardiac monitor, reassess.

## 2024-06-08 NOTE — ED ADULT NURSE REASSESSMENT NOTE - NS ED NURSE REASSESS COMMENT FT1
Report received from MARSHALL Rios. Pt A&Ox3 sitting up in stretcher endorsing relief of chest pain. Respirations even and unlabored on room air. No acute distress noted. Pt remains on continuous cardiac monitor, sinus tachycardia. MD Oliva aware. Denies headache, dizziness, chest pain and SOB at this time. Pt remains on heparin drip at this time. Plan of care ongoing, comfort measures provided and safety measures maintained. Cardiology at bedside assessing pt, awaiting recommendations.

## 2024-06-08 NOTE — ED PROVIDER NOTE - OBJECTIVE STATEMENT
69 yo female with a past medical history of HTN, hyperlipidemia, diabetes mellitus, hypothyroidism, known CAD with prior 3VCABG 2013 (LIMA-LAD, SVG-OM1, SVG-RCA (all patent by angiogram 2013), multiple PCIs most recent intervention s/p MATTHEW proximal ramus 2/2016 @ [facility], chronic systolic CHF, history of VT s/p ICD, recent heart attack in April (managed at [facility]), presents to the emergency department with 10/10 crushing substernal chest pain that radiates to the left upper extremity starting at 0400 on day of arrival. She took 1 81mg aspirin tablet as well as three nitro sprays + applied a nitro patch prior to arrival.    Secondary MRN [id number]

## 2024-06-08 NOTE — ED ADULT TRIAGE NOTE - CHIEF COMPLAINT QUOTE
brought in by grandson for c/o episode of left upper chest pain then again started at 1400 today, Pt moaning and grunting in triage grabbing left chest. PHx CABGx 3, AICD, cardiac stents x 18. brought in by grandson for c/o episode of left upper chest pain at 4am then again started at 1400 today, Pt moaning and grunting in triage grabbing left chest. PHx CABGx 3, AICD, cardiac stents x 18.

## 2024-06-08 NOTE — ED PROVIDER NOTE - PROGRESS NOTE DETAILS
Pj DEXTER PGY3: [phone] Dr. Sil alvaregna Patient was evaluated by cardiology NP who spoke with cardiology fellow on-call.  Agree with plan of heparin, Plavix, agree with CT angio rule out PE.  If negative, will give beta-blocker. Patient states pain has improved.  Feels much better.  No shortness of breath.  Heart rate down to 137.  Unchanged EKG.  Continue cardiology eval.  Will start heparin. pt stable, no cp or sob. pending cta. called ct tech for ETA Call CAT scan patient should be next.  Patient hemodynamically stable heparin infusing.  CT to be done and then admission to telemetry.  Signout to night team at end of shift endorsed to  [facility] D[facility]TER PGY3: CTA negative for PE.  Concern for groundglass opacities bilaterally.  Given ABX.  Heart rate persisted in 130s.  Now status post p.o. and IV Lopressor heart rate in the 80s.  Patient remains hemodynamically stable.  Will admit to telemetry.

## 2024-06-08 NOTE — ED PROVIDER NOTE - ATTENDING CONTRIBUTION TO CARE
Attending Statement: I have personally seen and examined this patient. I have fully participated in the care of this patient. I have reviewed all pertinent clinical information, including history physical exam, plan and the Resident's note and agree except as noted  "69 yo female with a past medical history of HTN, hyperlipidemia, diabetes mellitus, hypothyroidism, known CAD with prior 3VCABG 2013 (LIMA-LAD, SVG-OM1, SVG-RCA (all patent by angiogram 2013), multiple PCIs most recent intervention s/p MATTHEW proximal ramus 2/2016 @ [facility], chronic systolic CHF, history of VT s/p ICD"  Presents from home with grandson chief complaint of chest pain.  States the first episode occurred around 4amin the morning while in bed.  Had midsternal chest tightness that lasted for few minutes.  Self resolved.  SHe was doing well throughout the day until this afternoon when she had a second episode of chest discomfort."  Also at breath midsternal squeezing sensation with radiation to the left arm.  Associated with shortness of breath.  Nonradiating and nonpleuritic.  Prompted ER visit.  Still endorsing significant chest discomfort.  At this time does not feel short of breath does not have palpitations not diaphoretic not nauseous.  Patient states she feels similar to when she had a prior MI.  No recent travel no recent sick contacts no fever no chills no cough.  Been adherent to her medications.  No smoking history no alcohol no drug use.  Vital signs noted patient tachycardic with heart rate 149 with a blood pressure 170/103 pulse ox 98-100RA   Patient sitting up on the appears uncomfortable ANO x 3.  Able to answer questions in full sentences interacting with brian.  Abdomen is nontender.  No pedal edema bilaterally no calf tenderness bilaterally.  Plan EKG, cardiac monitor, labs, chest x-ray.  EKG was emailed to Dr. Rosales interventional cardiology, does not feel it needs "STEMI criteria.  No emergent cath at this time however will follow.

## 2024-06-08 NOTE — ED ADULT NURSE NOTE - CHIEF COMPLAINT QUOTE
brought in by grandson for c/o episode of left upper chest pain at 4am then again started at 1400 today, Pt moaning and grunting in triage grabbing left chest. PHx CABGx 3, AICD, cardiac stents x 18.

## 2024-06-09 DIAGNOSIS — Z29.9 ENCOUNTER FOR PROPHYLACTIC MEASURES, UNSPECIFIED: ICD-10-CM

## 2024-06-09 DIAGNOSIS — I50.21 ACUTE SYSTOLIC (CONGESTIVE) HEART FAILURE: ICD-10-CM

## 2024-06-09 DIAGNOSIS — I48.91 UNSPECIFIED ATRIAL FIBRILLATION: ICD-10-CM

## 2024-06-09 DIAGNOSIS — E78.5 HYPERLIPIDEMIA, UNSPECIFIED: ICD-10-CM

## 2024-06-09 DIAGNOSIS — I20.0 UNSTABLE ANGINA: ICD-10-CM

## 2024-06-09 DIAGNOSIS — R07.9 CHEST PAIN, UNSPECIFIED: ICD-10-CM

## 2024-06-09 DIAGNOSIS — Z79.899 OTHER LONG TERM (CURRENT) DRUG THERAPY: ICD-10-CM

## 2024-06-09 DIAGNOSIS — E11.9 TYPE 2 DIABETES MELLITUS WITHOUT COMPLICATIONS: ICD-10-CM

## 2024-06-09 DIAGNOSIS — I25.10 ATHEROSCLEROTIC HEART DISEASE OF NATIVE CORONARY ARTERY WITHOUT ANGINA PECTORIS: ICD-10-CM

## 2024-06-09 LAB
ADD ON TEST-SPECIMEN IN LAB: SIGNIFICANT CHANGE UP
ALBUMIN SERPL ELPH-MCNC: 3.4 G/DL — SIGNIFICANT CHANGE UP (ref 3.3–5)
ALP SERPL-CCNC: 79 U/L — SIGNIFICANT CHANGE UP (ref 40–120)
ALT FLD-CCNC: 25 U/L — SIGNIFICANT CHANGE UP (ref 4–33)
ANION GAP SERPL CALC-SCNC: 10 MMOL/L — SIGNIFICANT CHANGE UP (ref 7–14)
APTT BLD: 134.6 SEC — CRITICAL HIGH (ref 24.5–35.6)
APTT BLD: 32.1 SEC — SIGNIFICANT CHANGE UP (ref 24.5–35.6)
APTT BLD: 52.1 SEC — HIGH (ref 24.5–35.6)
AST SERPL-CCNC: 42 U/L — HIGH (ref 4–32)
BASOPHILS # BLD AUTO: 0.04 K/UL — SIGNIFICANT CHANGE UP (ref 0–0.2)
BASOPHILS NFR BLD AUTO: 0.5 % — SIGNIFICANT CHANGE UP (ref 0–2)
BILIRUB SERPL-MCNC: 0.5 MG/DL — SIGNIFICANT CHANGE UP (ref 0.2–1.2)
BUN SERPL-MCNC: 20 MG/DL — SIGNIFICANT CHANGE UP (ref 7–23)
CALCIUM SERPL-MCNC: 10.1 MG/DL — SIGNIFICANT CHANGE UP (ref 8.4–10.5)
CHLORIDE SERPL-SCNC: 103 MMOL/L — SIGNIFICANT CHANGE UP (ref 98–107)
CK MB BLD-MCNC: 11.5 % — HIGH (ref 0–2.5)
CK MB BLD-MCNC: 12.1 % — HIGH (ref 0–2.5)
CK MB BLD-MCNC: 12.9 % — HIGH (ref 0–2.5)
CK MB CFR SERPL CALC: 24.4 NG/ML — HIGH
CK MB CFR SERPL CALC: 25.8 NG/ML — HIGH
CK MB CFR SERPL CALC: 27.8 NG/ML — HIGH
CK SERPL-CCNC: 200 U/L — HIGH (ref 25–170)
CK SERPL-CCNC: 212 U/L — HIGH (ref 25–170)
CK SERPL-CCNC: 229 U/L — HIGH (ref 25–170)
CO2 SERPL-SCNC: 25 MMOL/L — SIGNIFICANT CHANGE UP (ref 22–31)
CREAT SERPL-MCNC: 1 MG/DL — SIGNIFICANT CHANGE UP (ref 0.5–1.3)
EGFR: 59 ML/MIN/1.73M2 — LOW
EOSINOPHIL # BLD AUTO: 0.53 K/UL — HIGH (ref 0–0.5)
EOSINOPHIL NFR BLD AUTO: 6.8 % — HIGH (ref 0–6)
GLUCOSE BLDC GLUCOMTR-MCNC: 115 MG/DL — HIGH (ref 70–99)
GLUCOSE BLDC GLUCOMTR-MCNC: 124 MG/DL — HIGH (ref 70–99)
GLUCOSE BLDC GLUCOMTR-MCNC: 127 MG/DL — HIGH (ref 70–99)
GLUCOSE BLDC GLUCOMTR-MCNC: 152 MG/DL — HIGH (ref 70–99)
GLUCOSE BLDC GLUCOMTR-MCNC: 173 MG/DL — HIGH (ref 70–99)
GLUCOSE SERPL-MCNC: 116 MG/DL — HIGH (ref 70–99)
HCT VFR BLD CALC: 38.1 % — SIGNIFICANT CHANGE UP (ref 34.5–45)
HCT VFR BLD CALC: 40.5 % — SIGNIFICANT CHANGE UP (ref 34.5–45)
HGB BLD-MCNC: 12.8 G/DL — SIGNIFICANT CHANGE UP (ref 11.5–15.5)
HGB BLD-MCNC: 13.2 G/DL — SIGNIFICANT CHANGE UP (ref 11.5–15.5)
IANC: 3.77 K/UL — SIGNIFICANT CHANGE UP (ref 1.8–7.4)
IMM GRANULOCYTES NFR BLD AUTO: 0.3 % — SIGNIFICANT CHANGE UP (ref 0–0.9)
INR BLD: 0.9 RATIO — SIGNIFICANT CHANGE UP (ref 0.85–1.18)
INR BLD: 0.9 RATIO — SIGNIFICANT CHANGE UP (ref 0.85–1.18)
LYMPHOCYTES # BLD AUTO: 2.83 K/UL — SIGNIFICANT CHANGE UP (ref 1–3.3)
LYMPHOCYTES # BLD AUTO: 36.3 % — SIGNIFICANT CHANGE UP (ref 13–44)
MAGNESIUM SERPL-MCNC: 2.3 MG/DL — SIGNIFICANT CHANGE UP (ref 1.6–2.6)
MCHC RBC-ENTMCNC: 28.8 PG — SIGNIFICANT CHANGE UP (ref 27–34)
MCHC RBC-ENTMCNC: 29.4 PG — SIGNIFICANT CHANGE UP (ref 27–34)
MCHC RBC-ENTMCNC: 32.6 GM/DL — SIGNIFICANT CHANGE UP (ref 32–36)
MCHC RBC-ENTMCNC: 33.6 GM/DL — SIGNIFICANT CHANGE UP (ref 32–36)
MCV RBC AUTO: 87.4 FL — SIGNIFICANT CHANGE UP (ref 80–100)
MCV RBC AUTO: 88.2 FL — SIGNIFICANT CHANGE UP (ref 80–100)
MONOCYTES # BLD AUTO: 0.61 K/UL — SIGNIFICANT CHANGE UP (ref 0–0.9)
MONOCYTES NFR BLD AUTO: 7.8 % — SIGNIFICANT CHANGE UP (ref 2–14)
NEUTROPHILS # BLD AUTO: 3.77 K/UL — SIGNIFICANT CHANGE UP (ref 1.8–7.4)
NEUTROPHILS NFR BLD AUTO: 48.3 % — SIGNIFICANT CHANGE UP (ref 43–77)
NRBC # BLD: 0 /100 WBCS — SIGNIFICANT CHANGE UP (ref 0–0)
NRBC # BLD: 0 /100 WBCS — SIGNIFICANT CHANGE UP (ref 0–0)
NRBC # FLD: 0 K/UL — SIGNIFICANT CHANGE UP (ref 0–0)
NRBC # FLD: 0 K/UL — SIGNIFICANT CHANGE UP (ref 0–0)
PHOSPHATE SERPL-MCNC: 5.7 MG/DL — HIGH (ref 2.5–4.5)
PLATELET # BLD AUTO: 204 K/UL — SIGNIFICANT CHANGE UP (ref 150–400)
PLATELET # BLD AUTO: 210 K/UL — SIGNIFICANT CHANGE UP (ref 150–400)
POTASSIUM SERPL-MCNC: 4.4 MMOL/L — SIGNIFICANT CHANGE UP (ref 3.5–5.3)
POTASSIUM SERPL-SCNC: 4.4 MMOL/L — SIGNIFICANT CHANGE UP (ref 3.5–5.3)
PROT SERPL-MCNC: 6.6 G/DL — SIGNIFICANT CHANGE UP (ref 6–8.3)
PROTHROM AB SERPL-ACNC: 10.1 SEC — SIGNIFICANT CHANGE UP (ref 9.5–13)
PROTHROM AB SERPL-ACNC: 10.1 SEC — SIGNIFICANT CHANGE UP (ref 9.5–13)
RBC # BLD: 4.36 M/UL — SIGNIFICANT CHANGE UP (ref 3.8–5.2)
RBC # BLD: 4.59 M/UL — SIGNIFICANT CHANGE UP (ref 3.8–5.2)
RBC # FLD: 14.4 % — SIGNIFICANT CHANGE UP (ref 10.3–14.5)
RBC # FLD: 14.6 % — HIGH (ref 10.3–14.5)
SODIUM SERPL-SCNC: 138 MMOL/L — SIGNIFICANT CHANGE UP (ref 135–145)
TROPONIN T, HIGH SENSITIVITY RESULT: 386 NG/L — CRITICAL HIGH
TROPONIN T, HIGH SENSITIVITY RESULT: 461 NG/L — CRITICAL HIGH
TROPONIN T, HIGH SENSITIVITY RESULT: 476 NG/L — CRITICAL HIGH
TROPONIN T, HIGH SENSITIVITY RESULT: 657 NG/L — CRITICAL HIGH
WBC # BLD: 7.21 K/UL — SIGNIFICANT CHANGE UP (ref 3.8–10.5)
WBC # BLD: 7.8 K/UL — SIGNIFICANT CHANGE UP (ref 3.8–10.5)
WBC # FLD AUTO: 7.21 K/UL — SIGNIFICANT CHANGE UP (ref 3.8–10.5)
WBC # FLD AUTO: 7.8 K/UL — SIGNIFICANT CHANGE UP (ref 3.8–10.5)

## 2024-06-09 PROCEDURE — 12345: CPT | Mod: NC

## 2024-06-09 PROCEDURE — 99223 1ST HOSP IP/OBS HIGH 75: CPT

## 2024-06-09 PROCEDURE — 93010 ELECTROCARDIOGRAM REPORT: CPT | Mod: 76

## 2024-06-09 PROCEDURE — G0316 PROLONG INPT EVAL ADD15 M: CPT

## 2024-06-09 RX ORDER — SODIUM CHLORIDE 9 MG/ML
1000 INJECTION, SOLUTION INTRAVENOUS
Refills: 0 | Status: DISCONTINUED | OUTPATIENT
Start: 2024-06-09 | End: 2024-06-12

## 2024-06-09 RX ORDER — METOPROLOL TARTRATE 50 MG
25 TABLET ORAL ONCE
Refills: 0 | Status: COMPLETED | OUTPATIENT
Start: 2024-06-09 | End: 2024-06-09

## 2024-06-09 RX ORDER — HEPARIN SODIUM 5000 [USP'U]/ML
INJECTION INTRAVENOUS; SUBCUTANEOUS
Qty: 25000 | Refills: 0 | Status: DISCONTINUED | OUTPATIENT
Start: 2024-06-09 | End: 2024-06-09

## 2024-06-09 RX ORDER — ONDANSETRON 8 MG/1
4 TABLET, FILM COATED ORAL EVERY 8 HOURS
Refills: 0 | Status: DISCONTINUED | OUTPATIENT
Start: 2024-06-09 | End: 2024-06-12

## 2024-06-09 RX ORDER — ACETAMINOPHEN 500 MG
650 TABLET ORAL EVERY 6 HOURS
Refills: 0 | Status: DISCONTINUED | OUTPATIENT
Start: 2024-06-09 | End: 2024-06-12

## 2024-06-09 RX ORDER — METOPROLOL TARTRATE 50 MG
25 TABLET ORAL THREE TIMES A DAY
Refills: 0 | Status: DISCONTINUED | OUTPATIENT
Start: 2024-06-09 | End: 2024-06-10

## 2024-06-09 RX ORDER — ATORVASTATIN CALCIUM 80 MG/1
80 TABLET, FILM COATED ORAL AT BEDTIME
Refills: 0 | Status: DISCONTINUED | OUTPATIENT
Start: 2024-06-09 | End: 2024-06-12

## 2024-06-09 RX ORDER — DEXTROSE 50 % IN WATER 50 %
25 SYRINGE (ML) INTRAVENOUS ONCE
Refills: 0 | Status: DISCONTINUED | OUTPATIENT
Start: 2024-06-09 | End: 2024-06-12

## 2024-06-09 RX ORDER — GLUCAGON INJECTION, SOLUTION 0.5 MG/.1ML
1 INJECTION, SOLUTION SUBCUTANEOUS ONCE
Refills: 0 | Status: DISCONTINUED | OUTPATIENT
Start: 2024-06-09 | End: 2024-06-12

## 2024-06-09 RX ORDER — DEXTROSE 50 % IN WATER 50 %
15 SYRINGE (ML) INTRAVENOUS ONCE
Refills: 0 | Status: DISCONTINUED | OUTPATIENT
Start: 2024-06-09 | End: 2024-06-12

## 2024-06-09 RX ORDER — METOPROLOL TARTRATE 50 MG
5 TABLET ORAL ONCE
Refills: 0 | Status: COMPLETED | OUTPATIENT
Start: 2024-06-09 | End: 2024-06-09

## 2024-06-09 RX ORDER — CLOPIDOGREL BISULFATE 75 MG/1
75 TABLET, FILM COATED ORAL DAILY
Refills: 0 | Status: DISCONTINUED | OUTPATIENT
Start: 2024-06-09 | End: 2024-06-12

## 2024-06-09 RX ORDER — PIPERACILLIN AND TAZOBACTAM 4; .5 G/20ML; G/20ML
3.38 INJECTION, POWDER, LYOPHILIZED, FOR SOLUTION INTRAVENOUS ONCE
Refills: 0 | Status: COMPLETED | OUTPATIENT
Start: 2024-06-09 | End: 2024-06-09

## 2024-06-09 RX ORDER — HEPARIN SODIUM 5000 [USP'U]/ML
700 INJECTION INTRAVENOUS; SUBCUTANEOUS
Qty: 25000 | Refills: 0 | Status: DISCONTINUED | OUTPATIENT
Start: 2024-06-09 | End: 2024-06-10

## 2024-06-09 RX ORDER — DEXTROSE 50 % IN WATER 50 %
12.5 SYRINGE (ML) INTRAVENOUS ONCE
Refills: 0 | Status: DISCONTINUED | OUTPATIENT
Start: 2024-06-09 | End: 2024-06-12

## 2024-06-09 RX ORDER — FUROSEMIDE 40 MG
40 TABLET ORAL DAILY
Refills: 0 | Status: DISCONTINUED | OUTPATIENT
Start: 2024-06-09 | End: 2024-06-11

## 2024-06-09 RX ORDER — LANOLIN ALCOHOL/MO/W.PET/CERES
3 CREAM (GRAM) TOPICAL AT BEDTIME
Refills: 0 | Status: DISCONTINUED | OUTPATIENT
Start: 2024-06-09 | End: 2024-06-12

## 2024-06-09 RX ORDER — INSULIN LISPRO 100/ML
VIAL (ML) SUBCUTANEOUS
Refills: 0 | Status: DISCONTINUED | OUTPATIENT
Start: 2024-06-09 | End: 2024-06-12

## 2024-06-09 RX ORDER — INSULIN LISPRO 100/ML
VIAL (ML) SUBCUTANEOUS AT BEDTIME
Refills: 0 | Status: DISCONTINUED | OUTPATIENT
Start: 2024-06-09 | End: 2024-06-12

## 2024-06-09 RX ORDER — ASPIRIN/CALCIUM CARB/MAGNESIUM 324 MG
81 TABLET ORAL DAILY
Refills: 0 | Status: DISCONTINUED | OUTPATIENT
Start: 2024-06-09 | End: 2024-06-12

## 2024-06-09 RX ORDER — DEXTROSE 10 % IN WATER 10 %
125 INTRAVENOUS SOLUTION INTRAVENOUS ONCE
Refills: 0 | Status: DISCONTINUED | OUTPATIENT
Start: 2024-06-09 | End: 2024-06-12

## 2024-06-09 RX ADMIN — HEPARIN SODIUM 700 UNIT(S)/HR: 5000 INJECTION INTRAVENOUS; SUBCUTANEOUS at 20:47

## 2024-06-09 RX ADMIN — Medication 81 MILLIGRAM(S): at 11:30

## 2024-06-09 RX ADMIN — HEPARIN SODIUM 700 UNIT(S)/HR: 5000 INJECTION INTRAVENOUS; SUBCUTANEOUS at 19:35

## 2024-06-09 RX ADMIN — ATORVASTATIN CALCIUM 80 MILLIGRAM(S): 80 TABLET, FILM COATED ORAL at 21:00

## 2024-06-09 RX ADMIN — HEPARIN SODIUM 0 UNIT(S)/HR: 5000 INJECTION INTRAVENOUS; SUBCUTANEOUS at 03:43

## 2024-06-09 RX ADMIN — PIPERACILLIN AND TAZOBACTAM 200 GRAM(S): 4; .5 INJECTION, POWDER, LYOPHILIZED, FOR SOLUTION INTRAVENOUS at 01:38

## 2024-06-09 RX ADMIN — CLOPIDOGREL BISULFATE 75 MILLIGRAM(S): 75 TABLET, FILM COATED ORAL at 11:30

## 2024-06-09 RX ADMIN — Medication 25 MILLIGRAM(S): at 01:38

## 2024-06-09 RX ADMIN — Medication 40 MILLIGRAM(S): at 05:31

## 2024-06-09 RX ADMIN — Medication 25 MILLIGRAM(S): at 21:00

## 2024-06-09 RX ADMIN — HEPARIN SODIUM 700 UNIT(S)/HR: 5000 INJECTION INTRAVENOUS; SUBCUTANEOUS at 04:46

## 2024-06-09 RX ADMIN — Medication 1: at 17:15

## 2024-06-09 RX ADMIN — Medication 5 MILLIGRAM(S): at 01:38

## 2024-06-09 RX ADMIN — HEPARIN SODIUM 700 UNIT(S)/HR: 5000 INJECTION INTRAVENOUS; SUBCUTANEOUS at 14:53

## 2024-06-09 RX ADMIN — Medication 25 MILLIGRAM(S): at 05:32

## 2024-06-09 RX ADMIN — Medication 25 MILLIGRAM(S): at 14:16

## 2024-06-09 RX ADMIN — HEPARIN SODIUM 700 UNIT(S)/HR: 5000 INJECTION INTRAVENOUS; SUBCUTANEOUS at 08:35

## 2024-06-09 NOTE — PATIENT PROFILE ADULT - FALL HARM RISK - HARM RISK INTERVENTIONS

## 2024-06-09 NOTE — H&P ADULT - HISTORY OF PRESENT ILLNESS
Secondary MRN [id number]    This is a 75 y/o F with pmhx of CAD with prior 3VCABG 2013 (LIMA-LAD, SVG-OM1, SVG-RCA (all patent by angiogram 2013), multiple PCIs most recent intervention s/p MATTHEW proximal ramus 2/2016 @ [facility], chronic systolic CHF, history of VT s/p ICD, recent heart attack in April (managed at [facility]), HTN, HLD, DM2, presented to the ED for chest pain. History obtained with lou  form patient, provided confusing and conflicting hx compared to what the cardiology team obtained. The patient stated that she had L shoulder pain where the ICD was placed for which it radiates from the L shoulder and to the back. The upper L chest pain occurs with exertion, improves with rest. She reported being at [facility] for a heart attack. The patient currently denies substernal chest pain, and mainly complains of pain where the ICD was placed Denies ICD firing or any shocking sensation. The patient denies shortness of breath. ROS otherwise negative. Reported that the pain started since march, and randomly occurs.

## 2024-06-09 NOTE — ED ADULT NURSE REASSESSMENT NOTE - NS ED NURSE REASSESS COMMENT FT1
MIGUELINA RN: pt medicated at this time. pt tolerated well. respirations even and unlabored. pt converted to NSR at this time. MD Oliva made aware. denies chest pain, SOB, weakness, n/v/d, HA, chills. no acute distress noted. awaiting dispo. safety maintained, side rails up

## 2024-06-09 NOTE — H&P ADULT - PROBLEM SELECTOR PLAN 1
Assessment:  - the patient presented for new onset chest pain that is atypical in nature  - troponins elevated, EKG shows aflutter, no ST changes noted   - chest pain free at bedside    Plan:  - tele monitoring  - echo pending  - cardiology consult - no urgent LHC needed overnight, monitor for now  - will obtain another set of troponins in the AM  - start DAPT, heparin drip  - patient instructed to notify RN and primary team if there are new onset chest pain

## 2024-06-09 NOTE — ED ADULT NURSE REASSESSMENT NOTE - NS ED NURSE REASSESS COMMENT FT1
Break RN: Pt is A&Ox4, resting in stretcher with no complaints at this time. Respirations even and unlabored, chest rise equal b/l. Sinus rhythm noted on monitor. Pt denies chest pain, SOB, abdominal pain, N/V/D, h/a, dizziness, numbness/tingling or any urinary symptoms at this time. Labs collected and sent. No acute distress noted. Safety maintained throughout.

## 2024-06-09 NOTE — H&P ADULT - NSHPLABSRESULTS_GEN_ALL_CORE
12.8   7.21  )-----------( 204      ( 09 Jun 2024 02:00 )             38.1       06-08    138  |  102  |  21  ----------------------------<  153<H>  4.3   |  22  |  1.13    Ca    10.1      08 Jun 2024 18:35  Mg     2.30     06-08    TPro  7.8  /  Alb  4.2  /  TBili  0.4  /  DBili  x   /  AST  25  /  ALT  22  /  AlkPhos  89  06-08      CARDIAC MARKERS ( 08 Jun 2024 19:37 )  x     / x     / 98 U/L / x     / 6.3 ng/mL  CARDIAC MARKERS ( 08 Jun 2024 18:35 )  x     / x     / 93 U/L / x     / 4.3 ng/mL        PT/INR - ( 08 Jun 2024 19:37 )   PT: 10.0 sec;   INR: <0.90 ratio         PTT - ( 09 Jun 2024 02:00 )  PTT:134.6 sec        Urinalysis Basic - ( 08 Jun 2024 18:35 )    Color: x / Appearance: x / SG: x / pH: x  Gluc: 153 mg/dL / Ketone: x  / Bili: x / Urobili: x   Blood: x / Protein: x / Nitrite: x   Leuk Esterase: x / RBC: x / WBC x   Sq Epi: x / Non Sq Epi: x / Bacteria: x            CXR: As per my read - no opacities noted, Will wait for prelim/official read    EKG: As per my read - atrial flutter QTc 525 ms    CT:  IMPRESSION:    Suboptimal evaluation of the subsegmental pulmonary arteries. No obvious embolus to the level of the segmental pulmonary arteries.    Pulmonary findings as described, which can be seen in noninfectious (pulmonary edema) and infectious processes.

## 2024-06-09 NOTE — PROVIDER CONTACT NOTE (CRITICAL VALUE NOTIFICATION) - BACKGROUND
69yo F admitted for chest pain radiating to L shoulder/ back. PMH HTN, HLD, DM, CAD, multiple PCIs, CHF

## 2024-06-09 NOTE — H&P ADULT - PROBLEM SELECTOR PLAN 7
- patient does not know her meds, I called daughter who also does not know the meds  - I used surescripts to find her meds from her alternate MRN as listed above  - will hold all meds until confirmed to be correct by [facility] pharmacy

## 2024-06-09 NOTE — PROGRESS NOTE ADULT - TIME BILLING
I have spent a total of greater than 50 minutes time spent to prepare to see the patient, including the Emergency room charts and ED progress notes, obtaining and reviewing history, physical examination, explaining the diagnosis, prognosis and treatment plan with the patient/family/caregiver. I also have spent the time ordering studies and testing, interpreting results, medicine reconciliation, subspecialty consultations as indicated and documentation as above.

## 2024-06-09 NOTE — H&P ADULT - ASSESSMENT
75 y/o F with pmhx of CAD with prior 3VCABG 2013 (LIMA-LAD, SVG-OM1, SVG-RCA (all patent by angiogram 2013), multiple PCIs most recent intervention s/p MATTHEW proximal ramus 2/2016 @ [facility], chronic systolic CHF, history of VT s/p ICD, recent heart attack in April (managed at [facility]), HTN, HLD, DM2, presented to the ED for chest pain. Admit for ACS work up.

## 2024-06-09 NOTE — H&P ADULT - PROBLEM SELECTOR PLAN 6
- patient with hx of CHF  - CT shows ground glass opacities and BNP is significant ly elevated, there is LE edema but patient does not have SOB  - will start lasix 40mg IV push QD  - monitor I/O, daily weights  - fluid restrict once patient gets [facility]

## 2024-06-09 NOTE — PHARMACOTHERAPY INTERVENTION NOTE - COMMENTS
Medication history is incomplete. Patient unable to verify current medication list. Outpatient pharmacy current closed, will follow up once reopened. Patient requested writer call daughter at [phone], daughter unable to speak, will follow up. Please call spectra [phone] if you have any questions.

## 2024-06-09 NOTE — H&P ADULT - NSHPADDITIONALINFOADULT_GEN_ALL_CORE
- Spent and extra 15 minutes to clarify the history with , also to obtain a list of meds from surescripts on a alternative MRN chart

## 2024-06-09 NOTE — H&P ADULT - NSHPPHYSICALEXAM_GEN_ALL_CORE
PHYSICAL EXAM:  VITALS: Vital Signs Last 24 Hrs  T(C): 36.4 (09 Jun 2024 00:12), Max: 36.5 (08 Jun 2024 20:31)  T(F): 97.6 (09 Jun 2024 00:12), Max: 97.7 (08 Jun 2024 20:31)  HR: 83 (09 Jun 2024 01:49) (83 - 149)  BP: 114/82 (09 Jun 2024 01:49) (104/80 - 173/103)  BP(mean): 93 (09 Jun 2024 00:12) (93 - 93)  RR: 15 (09 Jun 2024 00:12) (15 - 19)  SpO2: 95% (09 Jun 2024 00:12) (95% - 100%)    Parameters below as of 09 Jun 2024 00:12  Patient On (Oxygen Delivery Method): room air      GENERAL: NAD, comfortable at bedside, obese  HEAD:  Atraumatic, Normocephalic  EYES: EOMI, PERRL, conjunctiva and sclera clear  ENT: Moist Mucus Membranes present, no ulcers appreciated  NECK: Supple, No JVD  CHEST/LUNG: Clear to auscultation bilaterally; No wheezes, rales or rhonchi, no accessory muscle use, there is no erythema or tenderness on the ICD site on the L chest area  HEART: Regular rate and rhythm; No murmurs, rubs, or gallops, (+)S1, S2  ABDOMEN: Soft, Nontender, Nondistended; Normal Bowel sounds   EXTREMITIES: + LE edema noted b/l  PSYCH: normal mood and affect  NEUROLOGY: AAOx3, non-focal  SKIN: No rashes or lesions

## 2024-06-10 ENCOUNTER — RESULT REVIEW (OUTPATIENT)
Age: 74
End: 2024-06-10

## 2024-06-10 LAB
A1C WITH ESTIMATED AVERAGE GLUCOSE RESULT: 7.2 % — HIGH (ref 4–5.6)
ANION GAP SERPL CALC-SCNC: 17 MMOL/L — HIGH (ref 7–14)
APTT BLD: 44.8 SEC — HIGH (ref 24.5–35.6)
APTT BLD: 87.5 SEC — HIGH (ref 24.5–35.6)
BUN SERPL-MCNC: 20 MG/DL — SIGNIFICANT CHANGE UP (ref 7–23)
CALCIUM SERPL-MCNC: 9.4 MG/DL — SIGNIFICANT CHANGE UP (ref 8.4–10.5)
CHLORIDE SERPL-SCNC: 102 MMOL/L — SIGNIFICANT CHANGE UP (ref 98–107)
CK MB BLD-MCNC: 6.2 % — HIGH (ref 0–2.5)
CK MB CFR SERPL CALC: 9.6 NG/ML — HIGH
CK SERPL-CCNC: 155 U/L — SIGNIFICANT CHANGE UP (ref 25–170)
CO2 SERPL-SCNC: 20 MMOL/L — LOW (ref 22–31)
CREAT SERPL-MCNC: 0.98 MG/DL — SIGNIFICANT CHANGE UP (ref 0.5–1.3)
EGFR: 61 ML/MIN/1.73M2 — SIGNIFICANT CHANGE UP
ESTIMATED AVERAGE GLUCOSE: 160 — SIGNIFICANT CHANGE UP
GLUCOSE BLDC GLUCOMTR-MCNC: 122 MG/DL — HIGH (ref 70–99)
GLUCOSE BLDC GLUCOMTR-MCNC: 148 MG/DL — HIGH (ref 70–99)
GLUCOSE BLDC GLUCOMTR-MCNC: 149 MG/DL — HIGH (ref 70–99)
GLUCOSE BLDC GLUCOMTR-MCNC: 174 MG/DL — HIGH (ref 70–99)
GLUCOSE SERPL-MCNC: 124 MG/DL — HIGH (ref 70–99)
HCT VFR BLD CALC: 41 % — SIGNIFICANT CHANGE UP (ref 34.5–45)
HGB BLD-MCNC: 13.8 G/DL — SIGNIFICANT CHANGE UP (ref 11.5–15.5)
MAGNESIUM SERPL-MCNC: 2 MG/DL — SIGNIFICANT CHANGE UP (ref 1.6–2.6)
MCHC RBC-ENTMCNC: 29.5 PG — SIGNIFICANT CHANGE UP (ref 27–34)
MCHC RBC-ENTMCNC: 33.7 GM/DL — SIGNIFICANT CHANGE UP (ref 32–36)
MCV RBC AUTO: 87.6 FL — SIGNIFICANT CHANGE UP (ref 80–100)
NRBC # BLD: 0 /100 WBCS — SIGNIFICANT CHANGE UP (ref 0–0)
NRBC # FLD: 0 K/UL — SIGNIFICANT CHANGE UP (ref 0–0)
PHOSPHATE SERPL-MCNC: 5.2 MG/DL — HIGH (ref 2.5–4.5)
PLATELET # BLD AUTO: 229 K/UL — SIGNIFICANT CHANGE UP (ref 150–400)
POTASSIUM SERPL-MCNC: 4.8 MMOL/L — SIGNIFICANT CHANGE UP (ref 3.5–5.3)
POTASSIUM SERPL-SCNC: 4.8 MMOL/L — SIGNIFICANT CHANGE UP (ref 3.5–5.3)
RBC # BLD: 4.68 M/UL — SIGNIFICANT CHANGE UP (ref 3.8–5.2)
RBC # FLD: 14.5 % — SIGNIFICANT CHANGE UP (ref 10.3–14.5)
SODIUM SERPL-SCNC: 139 MMOL/L — SIGNIFICANT CHANGE UP (ref 135–145)
TROPONIN T, HIGH SENSITIVITY RESULT: 413 NG/L — CRITICAL HIGH
WBC # BLD: 6.99 K/UL — SIGNIFICANT CHANGE UP (ref 3.8–10.5)
WBC # FLD AUTO: 6.99 K/UL — SIGNIFICANT CHANGE UP (ref 3.8–10.5)

## 2024-06-10 PROCEDURE — 93306 TTE W/DOPPLER COMPLETE: CPT | Mod: 26

## 2024-06-10 PROCEDURE — 93010 ELECTROCARDIOGRAM REPORT: CPT

## 2024-06-10 PROCEDURE — 93282 PRGRMG EVAL IMPLANTABLE DFB: CPT | Mod: 26

## 2024-06-10 PROCEDURE — 99232 SBSQ HOSP IP/OBS MODERATE 35: CPT

## 2024-06-10 PROCEDURE — 99232 SBSQ HOSP IP/OBS MODERATE 35: CPT | Mod: GC

## 2024-06-10 PROCEDURE — 71045 X-RAY EXAM CHEST 1 VIEW: CPT | Mod: 26

## 2024-06-10 RX ORDER — METOPROLOL TARTRATE 50 MG
50 TABLET ORAL
Refills: 0 | Status: DISCONTINUED | OUTPATIENT
Start: 2024-06-10 | End: 2024-06-11

## 2024-06-10 RX ORDER — MORPHINE SULFATE 50 MG/1
4 CAPSULE, EXTENDED RELEASE ORAL ONCE
Refills: 0 | Status: DISCONTINUED | OUTPATIENT
Start: 2024-06-10 | End: 2024-06-10

## 2024-06-10 RX ADMIN — Medication 25 MILLIGRAM(S): at 13:19

## 2024-06-10 RX ADMIN — Medication 25 MILLIGRAM(S): at 05:16

## 2024-06-10 RX ADMIN — CLOPIDOGREL BISULFATE 75 MILLIGRAM(S): 75 TABLET, FILM COATED ORAL at 11:42

## 2024-06-10 RX ADMIN — Medication 81 MILLIGRAM(S): at 11:42

## 2024-06-10 RX ADMIN — HEPARIN SODIUM 700 UNIT(S)/HR: 5000 INJECTION INTRAVENOUS; SUBCUTANEOUS at 07:15

## 2024-06-10 RX ADMIN — MORPHINE SULFATE 4 MILLIGRAM(S): 50 CAPSULE, EXTENDED RELEASE ORAL at 05:08

## 2024-06-10 RX ADMIN — ATORVASTATIN CALCIUM 80 MILLIGRAM(S): 80 TABLET, FILM COATED ORAL at 21:01

## 2024-06-10 RX ADMIN — MORPHINE SULFATE 4 MILLIGRAM(S): 50 CAPSULE, EXTENDED RELEASE ORAL at 04:38

## 2024-06-10 RX ADMIN — HEPARIN SODIUM 0 UNIT(S)/HR: 5000 INJECTION INTRAVENOUS; SUBCUTANEOUS at 12:54

## 2024-06-10 RX ADMIN — HEPARIN SODIUM 700 UNIT(S)/HR: 5000 INJECTION INTRAVENOUS; SUBCUTANEOUS at 13:33

## 2024-06-10 RX ADMIN — Medication 40 MILLIGRAM(S): at 05:15

## 2024-06-10 RX ADMIN — HEPARIN SODIUM 700 UNIT(S)/HR: 5000 INJECTION INTRAVENOUS; SUBCUTANEOUS at 04:04

## 2024-06-10 RX ADMIN — Medication 50 MILLIGRAM(S): at 17:31

## 2024-06-10 NOTE — PHARMACOTHERAPY INTERVENTION NOTE - COMMENTS
Medication history update: Medication history is complete. Medication list updated in Outpatient Medication Record (OMR) per [facility], patient and daughter at bedside.  Please note:   ·	Patient/daughter unable to verify if isosorbide dinitrate is a current medication, they believe patient may be taking isosorbide mononitrate.  ·	Per pharmacy, isosorbide dinitrate 30mg orally every 8 hours dispensed 5/29/24. Additionally, isosorbide mononitrate ER 30mg orally once a day in the morning was last dispensed 12/4/23.  ·	OMR updated with most recently dispensed medication (isosorbide dinitrate) per pharmacy.    Home Medications:  amLODIPine 5 mg oral tablet: 1 tab(s) orally once a day (10 [place] 2024 18:46)  aspirin 81 mg oral delayed release tablet: 1 tab(s) orally once a day (10 [place] 2024 18:55)  atorvastatin 40 mg oral tablet: 1 tab(s) orally once a day (at bedtime) (10 [place] 2024 18:46)  clopidogrel 75 mg oral tablet: 1 tab(s) orally once a day (10 [place] 2024 18:46)  Entresto 24 mg-26 mg oral tablet: 1 tab(s) orally 2 times a day (10 [place] 2024 18:46)  Farxiga 5 mg oral tablet: 1 tab(s) orally once a day (10 [place] 2024 18:46)  FLUoxetine 10 mg oral tablet: 1 tab(s) orally once a day (10 [place] 2024 18:46)  isosorbide dinitrate 30 mg oral tablet: 1 tab(s) orally every 8 hours (10 [place] 2024 18:49)  levothyroxine 25 mcg (0.025 mg) oral tablet: 1 tab(s) orally once a day (current medication per patient/daughter; per pharmacy last dispensed 12/4/23) (10 [place] 2024 18:47)  metFORMIN 500 mg oral tablet: 1 tab(s) orally 2 times a day (10 [place] 2024 18:46)  metoprolol succinate 100 mg oral tablet, extended release: 1 tab(s) orally once a day (10 [place] 2024 18:46)  Mounjaro 7.5 mg/0.5 mL subcutaneous solution: 7.5 milligram(s) subcutaneously once a week on Thursday (10 [place] 2024 18:46)  nitroglycerin 0.4 mg sublingual tablet: 1 tab(s) sublingually every 5 minutes as needed for  chest pain up to 3 doses (10 [place] 2024 18:46)  pantoprazole 40 mg oral delayed release tablet: 1 tab(s) orally once a day (10 [place] 2024 18:46)  Xiidra 5% ophthalmic solution: 1 drop(s) in each eye 2 times a day as needed for  dry eyes (10 [place] 2024 18:46)    Please call spectra [id number] if you have any questions.  Medication history update: Medication history is complete. Medication list updated in Outpatient Medication Record (OMR) per [facility], patient and daughter at bedside.  Please note:   ·	Patient/daughter unable to verify if isosorbide dinitrate is a current medication, they believe patient may be taking isosorbide mononitrate.  ·	Per pharmacy, isosorbide dinitrate 30mg orally every 8 hours dispensed 5/29/24. Additionally, isosorbide mononitrate ER 30mg orally once a day in the morning was last dispensed 12/4/23.  ·	OMR updated with most recently dispensed medication (isosorbide dinitrate) per pharmacy.  ·	Concern for non-adherence, possibly contributing to unstable angina.    Home Medications:  amLODIPine 5 mg oral tablet: 1 tab(s) orally once a day (10 [place] 2024 18:46)  aspirin 81 mg oral delayed release tablet: 1 tab(s) orally once a day (10 [place] 2024 18:55)  atorvastatin 40 mg oral tablet: 1 tab(s) orally once a day (at bedtime) (10 [place] 2024 18:46)  clopidogrel 75 mg oral tablet: 1 tab(s) orally once a day (10 [place] 2024 18:46)  Entresto 24 mg-26 mg oral tablet: 1 tab(s) orally 2 times a day (10 [place] 2024 18:46)  Farxiga 5 mg oral tablet: 1 tab(s) orally once a day (10 [place] 2024 18:46)  FLUoxetine 10 mg oral tablet: 1 tab(s) orally once a day (10 [place] 2024 18:46)  isosorbide dinitrate 30 mg oral tablet: 1 tab(s) orally every 8 hours (10 [place] 2024 18:49)  levothyroxine 25 mcg (0.025 mg) oral tablet: 1 tab(s) orally once a day (current medication per patient/daughter; per pharmacy last dispensed 12/4/23) (10 [place] 2024 18:47)  metFORMIN 500 mg oral tablet: 1 tab(s) orally 2 times a day (10 [place] 2024 18:46)  metoprolol succinate 100 mg oral tablet, extended release: 1 tab(s) orally once a day (10 [place] 2024 18:46)  Mounjaro 7.5 mg/0.5 mL subcutaneous solution: 7.5 milligram(s) subcutaneously once a week on Thursday (10 [place] 2024 18:46)  nitroglycerin 0.4 mg sublingual tablet: 1 tab(s) sublingually every 5 minutes as needed for  chest pain up to 3 doses (10 [place] 2024 18:46)  pantoprazole 40 mg oral delayed release tablet: 1 tab(s) orally once a day (10 [place] 2024 18:46)  Xiidra 5% ophthalmic solution: 1 drop(s) in each eye 2 times a day as needed for  dry eyes (10 [place] 2024 18:46)    Please call spectra [phone] if you have any questions.  Medication history update: Medication history is complete. Medication list updated in Outpatient Medication Record (OMR) per [facility], patient and daughter at bedside.  Please note:   ·	Patient/daughter unable to verify if isosorbide dinitrate is a current medication, they believe patient may be taking isosorbide mononitrate.  ·	Per pharmacy, isosorbide dinitrate 30mg orally every 8 hours dispensed 5/29/24. Additionally, isosorbide mononitrate ER 30mg orally once a day in the morning was last dispensed 12/4/23.  ·	OMR updated with most recently dispensed medication (isosorbide dinitrate) per pharmacy.  ·	Concern for non-adherence, possibly contributing to unstable angina.    Spoke with ACP provider to notify OMR updated; will be reconciled.    Home Medications:  amLODIPine 5 mg oral tablet: 1 tab(s) orally once a day (10 [place] 2024 18:46)  aspirin 81 mg oral delayed release tablet: 1 tab(s) orally once a day (10 [place] 2024 18:55)  atorvastatin 40 mg oral tablet: 1 tab(s) orally once a day (at bedtime) (10 [place] 2024 18:46)  clopidogrel 75 mg oral tablet: 1 tab(s) orally once a day (10 [place] 2024 18:46)  Entresto 24 mg-26 mg oral tablet: 1 tab(s) orally 2 times a day (10 [place] 2024 18:46)  Farxiga 5 mg oral tablet: 1 tab(s) orally once a day (10 [place] 2024 18:46)  FLUoxetine 10 mg oral tablet: 1 tab(s) orally once a day (10 [place] 2024 18:46)  isosorbide dinitrate 30 mg oral tablet: 1 tab(s) orally every 8 hours (10 [place] 2024 18:49)  levothyroxine 25 mcg (0.025 mg) oral tablet: 1 tab(s) orally once a day (current medication per patient/daughter; per pharmacy last dispensed 12/4/23) (10 [place] 2024 18:47)  metFORMIN 500 mg oral tablet: 1 tab(s) orally 2 times a day (10 [place] 2024 18:46)  metoprolol succinate 100 mg oral tablet, extended release: 1 tab(s) orally once a day (10 [place] 2024 18:46)  Mounjaro 7.5 mg/0.5 mL subcutaneous solution: 7.5 milligram(s) subcutaneously once a week on Thursday (10 [place] 2024 18:46)  nitroglycerin 0.4 mg sublingual tablet: 1 tab(s) sublingually every 5 minutes as needed for  chest pain up to 3 doses (10 [place] 2024 18:46)  pantoprazole 40 mg oral delayed release tablet: 1 tab(s) orally once a day (10 [place] 2024 18:46)  Xiidra 5% ophthalmic solution: 1 drop(s) in each eye 2 times a day as needed for  dry eyes (10 [place] 2024 18:46)    Please call spectra [phone] if you have any questions.

## 2024-06-10 NOTE — PROGRESS NOTE ADULT - PROBLEM SELECTOR PLAN 1
Assessment:  - the patient presented for new onset chest pain that is atypical in nature  - troponins elevated and rising, EKG shows aflutter, no ST changes noted   - chest pain free at bedside currently     Plan:  - tele monitoring  - echo pending read  - cardiology consult - no plan for [facility] at this time, will need records from [facility]  - consideration for ICD lead as cause of symptoms although EP state this should be followed up outpatient  - continue to trend trops, currently uptrending   - c/w DAPT, heparin drip x48h  - patient instructed to notify RN and primary team if there are new onset chest pain Assessment:  - the patient presented for new onset chest pain that is atypical in nature  - troponins elevated and rising, EKG shows aflutter, no ST changes noted   - chest pain free at bedside currently     Plan:  - tele monitoring  - echo pending read  - cardiology consult - no plan for [facility] at this time, will need records from [facility]  - consideration for ICD lead as cause of symptoms although EP state this should be followed up outpatient  - continue to trend trops, currently uptrending   - c/w DAPT, heparin drip x48h  - patient instructed to notify RN and primary team if there are new onset chest pain  - will titrate up metoprolol to help relieve anginal pain; next step would be addition of Imdur and then potentially ranolazine

## 2024-06-10 NOTE — PROGRESS NOTE ADULT - TIME BILLING
Time-based billing (NON-critical care).     39 minutes spent on total encounter; more than 50% of the visit was spent counseling and / or coordinating care by the attending physician.  The necessity of the time spent during the encounter on this date of service was due to:     review of laboratory data, radiology results, consultants' recommendations, documentation in [place], discussion with patient/daughter and interdisciplinary staff (such as , social workers, etc). Interventions were performed as documented above. Time-based billing (NON-critical care).     39 minutes spent on total encounter; more than 50% of the visit was spent counseling and / or coordinating care by the attending physician.  The necessity of the time spent during the encounter on this date of service was due to:     review of laboratory data, radiology results, consultants' recommendations, documentation in [place], discussion with patient/daughter and interdisciplinary staff (such as , social workers, etc). Interventions were performed as documented above. Discussed with cardiology fellow.

## 2024-06-10 NOTE — PROVIDER CONTACT NOTE (CRITICAL VALUE NOTIFICATION) - ACTION/TREATMENT ORDERED:
ACP advised- no new orders at this time
ACP reached out to me regarding low glucose. Fingerstick done. 1st fingerstick was 64, 2nd fingerstick was 77. Juice given to patient. ACP notified. no new orders at this time
ACP aware

## 2024-06-10 NOTE — PROCEDURE NOTE - ADDITIONAL PROCEDURE DETAILS
p/w chest pain (near ICD site -has intermittent spasm like pain since the implant 2013)  battery 7.1 months  Patient's daughter made aware of follow up for generator change and potential lead revision

## 2024-06-10 NOTE — PROGRESS NOTE ADULT - PROBLEM SELECTOR PLAN 2
- patient already on heparin drip  - EP consult for possible a flutter and ICD interrogation - outpt f/u recommended

## 2024-06-11 LAB
ADD ON TEST-SPECIMEN IN LAB: SIGNIFICANT CHANGE UP
ANION GAP SERPL CALC-SCNC: 15 MMOL/L — HIGH (ref 7–14)
BUN SERPL-MCNC: 21 MG/DL — SIGNIFICANT CHANGE UP (ref 7–23)
CALCIUM SERPL-MCNC: 8.7 MG/DL — SIGNIFICANT CHANGE UP (ref 8.4–10.5)
CHLORIDE SERPL-SCNC: 107 MMOL/L — SIGNIFICANT CHANGE UP (ref 98–107)
CK MB BLD-MCNC: 3.5 % — HIGH (ref 0–2.5)
CK MB CFR SERPL CALC: 2.8 NG/ML — SIGNIFICANT CHANGE UP
CK SERPL-CCNC: 79 U/L — SIGNIFICANT CHANGE UP (ref 25–170)
CO2 SERPL-SCNC: 19 MMOL/L — LOW (ref 22–31)
CREAT SERPL-MCNC: 0.92 MG/DL — SIGNIFICANT CHANGE UP (ref 0.5–1.3)
EGFR: 65 ML/MIN/1.73M2 — SIGNIFICANT CHANGE UP
GLUCOSE BLDC GLUCOMTR-MCNC: 114 MG/DL — HIGH (ref 70–99)
GLUCOSE BLDC GLUCOMTR-MCNC: 122 MG/DL — HIGH (ref 70–99)
GLUCOSE BLDC GLUCOMTR-MCNC: 130 MG/DL — HIGH (ref 70–99)
GLUCOSE BLDC GLUCOMTR-MCNC: 132 MG/DL — HIGH (ref 70–99)
GLUCOSE SERPL-MCNC: 113 MG/DL — HIGH (ref 70–99)
HCT VFR BLD CALC: 40.1 % — SIGNIFICANT CHANGE UP (ref 34.5–45)
HGB BLD-MCNC: 12.8 G/DL — SIGNIFICANT CHANGE UP (ref 11.5–15.5)
MAGNESIUM SERPL-MCNC: 1.8 MG/DL — SIGNIFICANT CHANGE UP (ref 1.6–2.6)
MCHC RBC-ENTMCNC: 29 PG — SIGNIFICANT CHANGE UP (ref 27–34)
MCHC RBC-ENTMCNC: 31.9 GM/DL — LOW (ref 32–36)
MCV RBC AUTO: 90.9 FL — SIGNIFICANT CHANGE UP (ref 80–100)
NRBC # BLD: 0 /100 WBCS — SIGNIFICANT CHANGE UP (ref 0–0)
NRBC # FLD: 0 K/UL — SIGNIFICANT CHANGE UP (ref 0–0)
PHOSPHATE SERPL-MCNC: 5 MG/DL — HIGH (ref 2.5–4.5)
PLATELET # BLD AUTO: 207 K/UL — SIGNIFICANT CHANGE UP (ref 150–400)
POTASSIUM SERPL-MCNC: 4.3 MMOL/L — SIGNIFICANT CHANGE UP (ref 3.5–5.3)
POTASSIUM SERPL-SCNC: 4.3 MMOL/L — SIGNIFICANT CHANGE UP (ref 3.5–5.3)
RBC # BLD: 4.41 M/UL — SIGNIFICANT CHANGE UP (ref 3.8–5.2)
RBC # FLD: 14.5 % — SIGNIFICANT CHANGE UP (ref 10.3–14.5)
SODIUM SERPL-SCNC: 141 MMOL/L — SIGNIFICANT CHANGE UP (ref 135–145)
TROPONIN T, HIGH SENSITIVITY RESULT: 448 NG/L — CRITICAL HIGH
WBC # BLD: 7.22 K/UL — SIGNIFICANT CHANGE UP (ref 3.8–10.5)
WBC # FLD AUTO: 7.22 K/UL — SIGNIFICANT CHANGE UP (ref 3.8–10.5)

## 2024-06-11 PROCEDURE — 99233 SBSQ HOSP IP/OBS HIGH 50: CPT

## 2024-06-11 PROCEDURE — 93010 ELECTROCARDIOGRAM REPORT: CPT

## 2024-06-11 PROCEDURE — 99232 SBSQ HOSP IP/OBS MODERATE 35: CPT

## 2024-06-11 PROCEDURE — 99232 SBSQ HOSP IP/OBS MODERATE 35: CPT | Mod: GC

## 2024-06-11 RX ORDER — SACUBITRIL AND VALSARTAN 24; 26 MG/1; MG/1
1 TABLET, FILM COATED ORAL
Refills: 0 | Status: DISCONTINUED | OUTPATIENT
Start: 2024-06-11 | End: 2024-06-12

## 2024-06-11 RX ORDER — LEVOTHYROXINE SODIUM 125 MCG
25 TABLET ORAL DAILY
Refills: 0 | Status: DISCONTINUED | OUTPATIENT
Start: 2024-06-11 | End: 2024-06-12

## 2024-06-11 RX ORDER — MORPHINE SULFATE 50 MG/1
1 CAPSULE, EXTENDED RELEASE ORAL ONCE
Refills: 0 | Status: DISCONTINUED | OUTPATIENT
Start: 2024-06-11 | End: 2024-06-11

## 2024-06-11 RX ORDER — ISOSORBIDE MONONITRATE 60 MG/1
30 TABLET, EXTENDED RELEASE ORAL DAILY
Refills: 0 | Status: DISCONTINUED | OUTPATIENT
Start: 2024-06-11 | End: 2024-06-12

## 2024-06-11 RX ORDER — SPIRONOLACTONE 25 MG/1
25 TABLET, FILM COATED ORAL DAILY
Refills: 0 | Status: DISCONTINUED | OUTPATIENT
Start: 2024-06-11 | End: 2024-06-12

## 2024-06-11 RX ORDER — METOPROLOL TARTRATE 50 MG
100 TABLET ORAL DAILY
Refills: 0 | Status: DISCONTINUED | OUTPATIENT
Start: 2024-06-12 | End: 2024-06-12

## 2024-06-11 RX ORDER — FUROSEMIDE 40 MG
40 TABLET ORAL DAILY
Refills: 0 | Status: DISCONTINUED | OUTPATIENT
Start: 2024-06-12 | End: 2024-06-12

## 2024-06-11 RX ORDER — FLUOXETINE HCL 10 MG
10 CAPSULE ORAL DAILY
Refills: 0 | Status: DISCONTINUED | OUTPATIENT
Start: 2024-06-11 | End: 2024-06-12

## 2024-06-11 RX ORDER — PANTOPRAZOLE SODIUM 20 MG/1
40 TABLET, DELAYED RELEASE ORAL
Refills: 0 | Status: DISCONTINUED | OUTPATIENT
Start: 2024-06-11 | End: 2024-06-12

## 2024-06-11 RX ADMIN — ATORVASTATIN CALCIUM 80 MILLIGRAM(S): 80 TABLET, FILM COATED ORAL at 21:57

## 2024-06-11 RX ADMIN — MORPHINE SULFATE 1 MILLIGRAM(S): 50 CAPSULE, EXTENDED RELEASE ORAL at 04:52

## 2024-06-11 RX ADMIN — Medication 650 MILLIGRAM(S): at 22:49

## 2024-06-11 RX ADMIN — Medication 50 MILLIGRAM(S): at 17:07

## 2024-06-11 RX ADMIN — Medication 650 MILLIGRAM(S): at 23:49

## 2024-06-11 RX ADMIN — Medication 81 MILLIGRAM(S): at 11:31

## 2024-06-11 RX ADMIN — CLOPIDOGREL BISULFATE 75 MILLIGRAM(S): 75 TABLET, FILM COATED ORAL at 11:31

## 2024-06-11 RX ADMIN — Medication 40 MILLIGRAM(S): at 05:13

## 2024-06-11 RX ADMIN — SACUBITRIL AND VALSARTAN 1 TABLET(S): 24; 26 TABLET, FILM COATED ORAL at 17:12

## 2024-06-11 RX ADMIN — MORPHINE SULFATE 1 MILLIGRAM(S): 50 CAPSULE, EXTENDED RELEASE ORAL at 05:31

## 2024-06-11 RX ADMIN — Medication 50 MILLIGRAM(S): at 05:13

## 2024-06-11 NOTE — CONSULT NOTE ADULT - NS ATTEND AMEND GEN_ALL_CORE FT
74 year old female with PMH of severe CAD with MI s/p CABG x3 (LIMA-LAD, SVG-OM1, SVG-RCA in 2008 at [facility], multiple PCI with total 16 stents, recent MI in 4 /2024 s/p LHC (no PCI) at [facility], chronic HFrEF, history of VT s/p ICD (single chamber ST [employer] Fort[employer] VR [id number] by Dr. Johnson initial implant in 2012 complicated by lead malfunction and reimplant in 02/2013) HTN, HLD, DMT2 who presents to the J with c/o 2 episodes of 10/10 crushing substernal chest pain.     EP consulted for potential pain at ICD site. Device appears to have migrated into the breast. This is chronic however. Speaking with the daughter, the patient has anginal pain as well as this separate pain they believe is related to the location of the ICD. I discussed that it is hard to be sure, but we can do a pocket revision and move the device up and anchor it to see if it improves the pain. She will need a generator to be changed at the time as she is 7 months to [facility]. I can have her come in on Monday to perform as an outpatient. The daughter and patient are agreeable.

## 2024-06-11 NOTE — CHART NOTE - NSCHARTNOTESELECT_GEN_ALL_CORE
Event Note
elevated troponins/Event Note
heparin gtt/Event Note
ACP/Event Note
Event Note
Event Note

## 2024-06-11 NOTE — CONSULT NOTE ADULT - SUBJECTIVE AND OBJECTIVE BOX
CHIEF COMPLAINT: Chest tightness    HISTORY OF PRESENT ILLNESS:  Patient is a 74 year old female with PMHx known CAD with prior 3VCABG 2013 (LIMA-LAD, SVG-OM1, SVG-RCA (all patent by angiogram 2013), multiple PCIs most recent intervention s/p MATTHEW proximal ramus 2/2016 @ [facility], chronic systolic CHF, history of VT s/p ICD, recent heart attack in April (managed at [facility]), HTN, HLD, DM2 who presents to the [facility] emergency department with chief complaint of 2 episodes of 10/10 crushing substernal chest pain with radiation to the left upper extremity starting at 0400 on day of arrival 6/8, second episode around 1400 on day of arrival 6/8/24. Cardiology consulted for concern for STEMI and ?WCT to HR 140s on ED arrival, of which interventionalist on call Dr. Mujica was informed of EKG and deemed patient EKG does not meet STEMI criteria and does not require urgent LHC overnight. Patient AOx3, Simone speaking, and requested grandson present at bedside assist with Simone translation. On interview, patient endorses onset of symptoms around 0400 this morning 6/8 of which she states lasted roughly one hour and subsided, second episode of chest pain began around 1400 today 6/8 which she notes was worse than first episode today. At time of interview, patient reporting improved symptoms as she states "tightness is better, now 4/10 after morphine". Patient requesting medication for gas pain as she states "she currently has a lot of gas". Patient states prior to ED arrival she took 1 81mg aspirin tablet as well as three nitro sprays + applied a nitro patch. Patient grandson states she was recently hospitalized at [facility] for management of ACS, of which she had angiogram in 4/2024 and was told patient "has failed graft and still has 16-18 stents in her heart, they did not place stents and continued her medications". Patient states she saw outpatient cardiologist Dr. Deep Johnson MD for follow up 5/31/24 of which she says "everything was fine at the time and he told me to keep taking my medications" and reports recent check of AICD within past 3 months of which she reports "ICD is fine". She endorses compliance with home medications. Denies prodrome of lightheadedness/dizziness/HA/syncope at home, denies recent infection exposure, recent travel, smoking/ETOH/illicit substance use history, denies LE edema, SOB, palpitations.    ED COURSE:  VSS in triage /103, , afebrile and satting >95% on room air  EKG obtained and per ED c/f STEMI with elevations in V1 V2 with ?new QRS widening, interventionalist Dr. Felix Mujica contacted overnight and he recommends patient does not require urgent LHC overnight, EKG not meeting STEMI criteria  2 EKGs obtained in ED today 6/8 nondynamic and demonstrating tachycardia to HR 140s with q waves in anterior leads, prior EKG from 8/2023 showing sinus rhythm with q waves in anterior leads and nonspecific QRS widening  Patient received  mg x1, morphine 4 mg IVP x1, and started on heparin gtt per ED  Pertinent labs: WBC 9.42, H/H 14.7/40.7, Plt 245, Na 138, K 4.3, Mg 2.3, trop 83->100, LFTs wnl, CXR obtained and demonstrating post CABG and median sternotomy/coronary stents noted/Left chest wall ICD in place/Lungs are clear/No pleural effusion or pneumothorax.  On exam, HR 120s-low 140s, on telemetry with ?questionable A tach or A flutter, EKGs nondynamic and patient endorsing improved symptoms      HOME MEDICATIONS:  ASA 81 mg  Plavix 75 mg  Amlodipine 5 mg daily  Coreg 3.125 mg BID  Metformin 500 mg   PPI  Nitro SL 0.4 mg PRN       Secondary MRN [id number]  Allergies  Per patient: Penicillin    	    MEDICATIONS:  heparin   Injectable 4000 Unit(s) IV Push every 6 hours PRN  heparin  Infusion.  Unit(s)/Hr IV Continuous <Continuous>                  PAST MEDICAL & SURGICAL HISTORY:  Coronary artery disease      Hypertension      Diabetes mellitus      S/P coronary artery bypass graft x 3          FAMILY HISTORY:      SOCIAL HISTORY:    [ x ] Non-smoker  [ ] Smoker  [ ] Alcohol  [ x ] Denies Alcohol      REVIEW OF SYSTEMS:  CONSTITUTIONAL: no fevers or chills  EYES/ENT: No visual changes; No vertigo or throat pain  NECK: No JVD  RESPIRATORY:  No cough, wheezing, chills or hemoptysis, SOB  CARDIOVASCULAR: +Chest tightness, palpitations, passing out, dizziness, or leg swelling  GASTROINTESTINAL: No abdominal or epigastric pain. No nausea/vomiting/melena  Genitourinary: No dysuria, frequency or hematuria  NEUROLOGICAL: No numbness or weakness  SKIN: No itching, burning, rashes or lesions      PHYSICAL EXAM:  T(C): 36.5 (06-08-24 @ 20:31), Max: 36.5 (06-08-24 @ 20:31)  HR: 138 (06-08-24 @ 20:31) (137 - 149)  BP: 118/90 (06-08-24 @ 20:31) (104/80 - 173/103)  RR: 18 (06-08-24 @ 20:31) (18 - 19)  SpO2: 99% (06-08-24 @ 20:31) (98% - 100%)  Wt(kg): --  ICU Vital Signs Last 24 Hrs  T(C): 36.5 (08 Jun 2024 20:31), Max: 36.5 (08 Jun 2024 20:31)  T(F): 97.7 (08 Jun 2024 20:31), Max: 97.7 (08 Jun 2024 20:31)  HR: 138 (08 Jun 2024 20:31) (137 - 149)  BP: 118/90 (08 Jun 2024 20:31) (104/80 - 173/103)  RR: 18 (08 Jun 2024 20:31) (18 - 19)  SpO2: 99% (08 Jun 2024 20:31) (98% - 100%)    O2 Parameters below as of 08 Jun 2024 20:31  Patient On (Oxygen Delivery Method): room air          I&O's Summary      PHYSICAL EXAM:  Appearance: Normal	  HEENT:   Normal oral mucosa	  Cardiovascular: Sternotomy incision stable, patient with incision for placement of subcutaneous ICD. Normal S1 S2, No JVD, No murmurs, No LE edema  Respiratory: Lungs clear to auscultation	  Psychiatry: A & O x 3, Mood & affect appropriate  Gastrointestinal:  Soft, Non-tender, + BS	  Skin: No rashes, No ecchymoses, No cyanosis	  Neurologic: Non-focal  Extremities: Warm and well perfused. 2+ pulses bilaterally        LABS:	 	    CBC Full  -  ( 08 Jun 2024 18:35 )  WBC Count : 9.42 K/uL  Hemoglobin : 14.0 g/dL  Hematocrit : 40.7 %  Platelet Count - Automated : 245 K/uL  Mean Cell Volume : 85.7 fL  Mean Cell Hemoglobin : 29.5 pg  Mean Cell Hemoglobin Concentration : 34.4 gm/dL  Auto Neutrophil # : 5.58 K/uL  Auto Lymphocyte # : 2.54 K/uL  Auto Monocyte # : 0.77 K/uL  Auto Eosinophil # : 0.46 K/uL  Auto Basophil # : 0.04 K/uL  Auto Neutrophil % : 59.2 %  Auto Lymphocyte % : 27.0 %  Auto Monocyte % : 8.2 %  Auto Eosinophil % : 4.9 %  Auto Basophil % : 0.4 %    06-08    138  |  102  |  21  ----------------------------<  153<H>  4.3   |  22  |  1.13    Ca    10.1      08 Jun 2024 18:35  Mg     2.30     06-08    TPro  7.8  /  Alb  4.2  /  TBili  0.4  /  DBili  x   /  AST  25  /  ALT  22  /  AlkPhos  89  06-08      proBNP:   Lipid Profile:   HgA1c:   TSH:     CARDIAC MARKERS:  Troponin 83->100                  PREVIOUS DIAGNOSTIC TESTING:    [ ] Echocardiogram:  [ ]  Catheterization:  [ ] Stress Test:  	  
Patient is a 74y old  Female who presents with a chief complaint of chest pain (11 Jun 2024 14:43)          HPI: HPI verified with per patient's daughter Yashira [phone], patient requests her daughter to translate     74 year old female with PMH of severe CAD with MI s/p CABG x3 (LIMA-LAD, SVG-OM1, SVG-RCA in 2008 at [facility], multiple PCI with total 16 stents, recent MI in 4 /2024 s/p LHC (no PCI) at [facility], chronic HFrEF, history of VT s/p ICD (single chamber ST [place] by Dr. Johnson initial implant in 2012 complicated by lead malfunction and reimplant in 02/2013) HTN, HLD, DMT2 who presents to the [facility] with c/o 2 episodes of 10/10 crushing substernal chest pain.  Cardiology consulted for STEMI/WCT with  and elevated Trop.   ICD interrogation revealed normal device function no ICD shocks or pacing noted.  ICD battery is 7.1 months noted.   Patient endorses "upper body postural induced" severe chest pain over ICD pocket area intermittently since the implant on 2/13/2013. Pain relieves with morphine.  She denies CP/SOB or palpitations or syncope.  Never had ICD shocks.   Telemetry and EKG demonstrates SR with PVC's with wide QRS at 148ms, no VT or AF seen. Echo revealed LVEF 37%, moderate to severe mitral regurgitation.   EP consulted for pocket revision with ICD generator change.         PAST MEDICAL & SURGICAL HISTORY:  Coronary artery disease  MI s/p PCI       Hypertension      Diabetes mellitus      S/P coronary artery bypass graft x 3          MEDICATIONS  (STANDING):  aspirin enteric coated 81 milliGRAM(s) Oral daily  atorvastatin 80 milliGRAM(s) Oral at bedtime  clopidogrel Tablet 75 milliGRAM(s) Oral daily  dextrose 10% Bolus 125 milliLiter(s) IV Bolus once  dextrose 5%. 1000 milliLiter(s) (100 mL/Hr) IV Continuous <Continuous>  dextrose 5%. 1000 milliLiter(s) (50 mL/Hr) IV Continuous <Continuous>  dextrose 50% Injectable 25 Gram(s) IV Push once  dextrose 50% Injectable 12.5 Gram(s) IV Push once  glucagon  Injectable 1 milliGRAM(s) IntraMuscular once  insulin lispro (ADMELOG) corrective regimen sliding scale   SubCutaneous three times a day before meals  insulin lispro (ADMELOG) corrective regimen sliding scale   SubCutaneous at bedtime  isosorbide   mononitrate ER Tablet (IMDUR) 30 milliGRAM(s) Oral daily  metoprolol tartrate 50 milliGRAM(s) Oral two times a day  sacubitril 24 mG/valsartan 26 mG 1 Tablet(s) Oral two times a day    MEDICATIONS  (PRN):  acetaminophen     Tablet .. 650 milliGRAM(s) Oral every 6 hours PRN Temp greater or equal to 38C (100.4F), Mild Pain (1 - 3)  aluminum hydroxide/magnesium hydroxide/simethicone Suspension 30 milliLiter(s) Oral every 4 hours PRN Dyspepsia  dextrose Oral Gel 15 Gram(s) Oral once PRN Blood Glucose LESS THAN 70 milliGRAM(s)/deciliter  melatonin 3 milliGRAM(s) Oral at bedtime PRN Insomnia  ondansetron Injectable 4 milliGRAM(s) IV Push every 8 hours PRN Nausea and/or Vomiting    Allergies    No Known Allergies    Intolerances      FAMILY HISTORY:  No pertinent family history in first degree relatives        SOCIAL HISTORY:  Denies smoking; no   Alcohol  or  Drug abuse               REVIEW OF SYSTEMS:    CONSTITUTIONAL: No fever, weight loss, chills, shakes, or fatigue  EYES: No eye pain, visual disturbances, or discharge  ENMT:  No difficulty hearing, tinnitus, vertigo; No sinus or throat pain  NECK: No pain or stiffness  RESPIRATORY: No cough, wheezing, hemoptysis, or shortness of breath  CARDIOVASCULAR: No dyspnea, palpitations, dizziness, syncope, paroxysmal nocturnal dyspnea, orthopnea, or arm or leg swelling + chest pain  GASTROINTESTINAL: No abdominal  or epigastric pain, nausea, vomiting, hematemesis, diarrhea, constipation, melena or bright red blood.  GENITOURINARY: No dysuria, nocturia, hematuria, or urinary incontinence  NEUROLOGICAL: No headaches, memory loss, slurred speech, limb weakness, loss of strength, numbness, or tremors  SKIN: No itching, burning, rashes, or lesions   LYMPH NODES: No enlarged glands  ENDOCRINE: No heat or cold intolerance, or hair loss  MUSCULOSKELETAL: No joint pain or swelling, muscle, back, or extremity pain  PSYCHIATRIC: No depression, anxiety, or difficulty sleeping  HEME/LYMPH: No easy bruising or bleeding gums  ALLERY AND IMMUNOLOGIC: No hives or rash.      Vital Signs Last 24 Hrs  T(C): 36.4 (11 Jun 2024 17:00), Max: 36.7 (10 [place] 2024 21:15)  T(F): 97.6 (11 Jun 2024 17:00), Max: 98.1 (10 [place] 2024 21:15)  HR: 69 (11 Jun 2024 17:00) (67 - 77)  BP: 128/80 (11 Jun 2024 17:00) (122/65 - 145/75)  BP(mean): --  RR: 18 (11 Jun 2024 17:00) (17 - 18)  SpO2: 96% (11 Jun 2024 17:00) (96% - 100%)    Parameters below as of 11 Jun 2024 17:00  Patient On (Oxygen Delivery Method): room air        PHYSICAL EXAM:    GENERAL: In no apparent distress  HEAD:  Atraumatic, Normocephalic  NECK: Supple . No JVD or carotid bruit or thyroidmegaly.  Carotid pulse is 2+ bilaterally.  PULMONARY: Clear to auscultation and perfusion.  No rales, wheezing, or rhonchi bilaterally.  HEART: Regular rate and rhythm; No murmurs, rubs, or gallops.  L ICD +mild tenderness on palpation   ABDOMEN: Soft, Nontender, Nondistended; Bowel sounds present  EXTREMITIES: No clubbing, cyanosis, or edema  NEUROLOGICAL: Alert oriented to person, place and time.  Speech clear.  Skin: Dry intact, no rashes or lesions.          INTERPRETATION OF TELEMETRY:  Sinus rhythm with occasional PVC's, no VT or AF seen    ECG: SR with PVC's , intraventricular block, QRS 148ms          LABS:                        12.8   7.22  )-----------( 207      ( 11 Jun 2024 03:38 )             40.1     06-11    141  |  107  |  21  ----------------------------<  113<H>  4.3   |  19<L>  |  0.92    Ca    8.7      11 Jun 2024 03:38  Phos  5.0     06-11  Mg     1.80     06-11      CARDIAC MARKERS ( 11 Jun 2024 03:38 )  x     / x     / 79 U/L / x     / 2.8 ng/mL  CARDIAC MARKERS ( 10 [place] 2024 03:23 )  x     / x     / 155 U/L / x     / 9.6 ng/mL      PT/INR - ( 09 Jun 2024 20:05 )   PT: 10.1 sec;   INR: 0.90 ratio         PTT - ( 10 [place] 2024 11:54 )  PTT:87.5 sec  Urinalysis Basic - ( 11 Jun 2024 03:38 )    Color: x / Appearance: x / SG: x / pH: x  Gluc: 113 mg/dL / Ketone: x  / Bili: x / Urobili: x   Blood: x / Protein: x / Nitrite: x   Leuk Esterase: x / RBC: x / WBC x   Sq Epi: x / Non Sq Epi: x / Bacteria: x        BNP  RADIOLOGY & ADDITIONAL STUDIES:  PREVIOUS DIAGNOSTIC TESTING:      ECHO FINDINGS:     CONCLUSIONS:      1. Technically difficult image quality.   2. The left ventricular cavity is normal in size. Left ventricular wall thickness is normal. Left ventricular systolic function is moderately decreased with a calculated ejection fraction of 37 % by the García's biplane method of disks. There are regional wall motion abnormalities present. Hypokinesis of the inferolateral wall, basal to mid inferior wall, and basal inferoseptum.   3. Structurally normal mitral valve with normal leaflet excursion. There is calcification of the mitral valve annulus. There is moderate to severe mitral regurgitation.   4. No prior echocardiogram is available for comparison.    ________________________________________________________________________________________    STRESS FINDINGS:    CATHETERIZATION FINDINGS:

## 2024-06-11 NOTE — PROGRESS NOTE ADULT - PROBLEM SELECTOR PLAN 2
Unclear if Afib vs known hx of VT s/p AICD  No prior hx of Afib, CHADs-VASc would be 4-5. Questionable Afib on telemetry but not seen on EKG.   EP interrogated ICD.  EP may consider ICD generator change and potential lead revision.    Continue telemetry monitoring. Unclear if Afib vs known hx of VT s/p AICD. No episodes of Afib on tele.  No prior hx of Afib, CHADs-VASc would be 4-5. Questionable Afib on telemetry but not seen on EKG.   EP interrogated ICD.  EP may consider ICD generator change and potential lead revision.    Continue telemetry monitoring.

## 2024-06-11 NOTE — PROGRESS NOTE ADULT - PROBLEM SELECTOR PLAN 1
Assessment:  - the patient presented for new onset chest pain that is atypical in nature  - troponins elevated and rising, EKG shows aflutter, no ST changes noted   - chest pain free at bedside currently     Plan:  - tele monitoring  - cardiology consult - no plan for LHC at this time - records from [facility] were received and reviewed through the patient portal  - consideration for ICD lead as cause of symptoms although EP state this should be followed up outpatient  - continue to trend trops, currently uptrending   - c/w DAPT, s/p heparin drip x48h  - patient instructed to notify RN and primary team if there are new onset chest pain  - metoprolol adjusted to succinate dose; resuming home Imdur and Entresto    Agree with cardiology that there are likely two etiologies of chest pain - true angina related to her heavy atherosclerotic burden and musculoskeletal pain partially related to the location of her ICD.

## 2024-06-11 NOTE — PROGRESS NOTE ADULT - TIME BILLING
Time-based billing (NON-critical care).     36 minutes spent on total encounter; more than 50% of the visit was spent counseling and / or coordinating care by the attending physician.  The necessity of the time spent during the encounter on this date of service was due to:     review of laboratory data, radiology results, consultants' recommendations, documentation in [place], discussion with patient/daughter and interdisciplinary staff (such as , social workers, etc). Interventions were performed as documented above.

## 2024-06-11 NOTE — PROGRESS NOTE ADULT - ATTENDING COMMENTS
The patient was seen and examined with the Cardiology Consultation Teaching Service.     Recurrent of chest pain at the device site overnight.     No dyspnea, orthopnea or PND  No palpitations or dizziness   No current chest pain    PMH/PSH:  Coronary artery disease  3-vessel coronary artery bypass (LIMA-LAD, SVG-OM1, occluded SVG-RCA)  Multiple percutaneous coronary interventions with drug-eluting stents, ramus 2016  Chronic heart failure with reduced LV function  VT with ICD implantation  Diabetes  Hypertension  Dyslipidemia    Comfortable-appearing woman in no acute distress  Alert and oriented  Afebrile  Vital signs stable  Hypertensive  I/O net negative 0.3L  JVP is not elevated  Clear lungs  Normal heart sounds  Extremities are warm and perfused  No peripheral edema     Normal blood counts  PTT 44.8 on unfractionated heparin  Metabolic acidosis 19, AG 15  GFR 65    Hs-troponin 448, 413, peak 657  CK-MB 2.8, index 3.5  Pro-BNP 7713    HbA1c 7.2    Echocardiography demonstrates reduced LV systolic function, LVEF 37% with hypokinesis of the inferior segments. Moderate-severe MR, moderate TR Estimated PA systolic pressure is 55 mmHg.    Impression and Recommendations:   74-year-old woman with coronary artery disease, prior CABG and multiple PCI, with recent coronary angiography that demonstrated an occluded SVG-RCA for which medical therapy was recommended. She presents with recurrent chest pain, though her daughter describes this distinct from her prior angina. She was noted to have significantly elevated hs-troponin and pro-BNP, now being treated for non-JEANETTE ACS.     Recent coronary angiography report is highly suggestive that this patient has severe underlying coronary disease that is not amenable to revascularization. We discussed the utility of further angiography with the patient and her daughter and our plans to intensify medical therapy at this time.     Continue aspirin, clopidogrel and atorvastatin for her known coronary disease. Metoprolol should be titrated to a resting HR in the 50s. I would restart her outpatient isosorbide. Ranolazine can also be considered if the patient's chest pain is uncontrolled.     Please note that I believe the patient potentially experiences two types of chest pain--one is more consistent with angina, and the other is more consistent with musculoskeletal pain. I spoke with the EP team overnight, and they will consider expediting the patient's generator change and possible revision.     Continue intravenous diuretics for now. I would plan on consolidating metoprolol tartrate to succinate given her LV function, and restart her some sacubitril-valsartan. Spironolactone is also indicated for her chronic heart failure with reduced EF, as is an SGLT2-inhibitor.    Darinel Jackson MD FAC FACP  Cardiology  [phone]
The patient was seen and examined with the Cardiology Consultation Teaching Service.   Daughter at bedside    No overnight events    No chest pain  No dyspnea, orthopnea or PND  No palpitations or dizziness     Cath report was reviewed:  LIMA-LAD patent, SVG-OM1 with diffuse moderate disease, SVG-RCA occluded  Native coronary circulation ostial/proximal occlusion of LMCA and RCA    In conversing with the daughter, she reports that her current chest pain syndrome is different from her prior anginal symptoms, and she is much more concerned about the patient's device. She is also concerned because this is a time sensitive matter; the family is planning to relocate to [place] on August 1, and she strongly prefers for her mother to be cared for here.     PMH/PSH:  Coronary artery disease  3-vessel coronary artery bypass (LIMA-LAD, SVG-OM1, occluded SVG-RCA)  Multiple percutaneous coronary interventions with drug-eluting stents, ramus 2016  Chronic heart failure with reduced LV function  VT with ICD implantation  Diabetes  Hypertension  Dyslipidemia    Comfortable-appearing woman in no acute distress  Alert and oriented  Afebrile  Vital signs stable  Hypertensive  JVP is not elevated  Clear lungs  Normal heart sounds  Extremities are warm and perfused  No peripheral edema     Normal blood counts  PTT 44.8 on unfractionated heparin  Metabolic acidosis 20, AG 17  GFR 61    Hs-troponin 413, peak 657  Pro-BNP 7713    HbA1c 7.2    Echocardiography demonstrates reduced LV systolic function, LVEF 37% with hypokinesis of the inferior segments. Moderate-severe MR, moderate TR Estimated PA systolic pressure is 55 mmHg.    Impression and Recommendations:   74-year-old woman with coronary artery disease, prior CABG and multiple PCI, with recent coronary angiography that demonstrated an occluded SVG-RCA for which medical therapy was recommended. She presents with recurrent chest pain, though her daughter describes this distinct from her prior angina. She was noted to have significantly elevated hs-troponin and pro-BNP, now being treated for non-JEANETTE ACS.     Recent coronary angiography report is highly suggestive that this patient has severe underlying coronary disease that is not amenable to revascularization. Angiography could be considered to further interrogate the SVG-OM1, though as described in the cath report, it also sounds to be primarily amenable to medications, though this is difficult to determine without the actual angiogram.     Her LV function is further reduced compared to prior reports of 45-50%. There is also moderate-severe MR and her estimated pulmonary pressures are elevated, suggesting that she may feel better with diuresis, although dyspnea is not her complaint.     EP was consulted regarding the pain that is troubling the patient and her family most, which is that associated with her device. Will discuss consideration for expediting the patient's generator change and possible lead revision if they believe it is indicated.     Continue aspirin, clopidogrel and atorvastatin for her known coronary disease. Metoprolol should be titrated to a resting HR in the 50s. I would restart her outpatient isosorbide.     Continue intravenous diuretics for now. I would plan on consolidating metoprolol tartrate to succinate given her LV function, and restart her some sacubitril-valsartan. Spironolactone is also indicated for her chronic heart failure with reduced EF, as is an SGLT2-inhibitor.     Darinel Jackson MD FAC FACP  Cardiology  [phone]

## 2024-06-11 NOTE — CHART NOTE - NSCHARTNOTEFT_GEN_A_CORE
Spoke to cardiology team this AM, no plan for cath as patient had a recent cath in [facility] in 4/2024 which was normal. Per cardiology, patient's pain seems to be around the ICD site. EP to interrogate ICD device today. Per cardiology, continue heparin drip for total of 48 hours.
Trops on admission 83. Continuing to uptrend. 83--->100--->386--->461--->657.   Discussed with cardiology, patient to have cardiac catheterization tomorrow.  Discussed with Dr. Arellano.
alerted by RN that pt c/o chest pain. evaluated pt at bedside with [employer] speaking staff member. pt c/o 4/10 squeezing pain around the ICD site that resolved upon my arrival to bedside. pt states she had this pain in the past. pt admitted with nstemi and is on a heparin gtt. pt pending cath today. denies pain at this time. denies: abdominal pain, shortness of breath, nausea, vomiting, headache dizziness.    Vital Signs Last 24 Hrs  T(C): 36.8 (09 Jun 2024 21:00), Max: 36.8 (09 Jun 2024 05:15)  T(F): 98.3 (09 Jun 2024 21:00), Max: 98.3 (09 Jun 2024 21:00)  HR: 80 (10 [place] 2024 03:00) (71 - 80)  BP: 157/97 (10 [place] 2024 03:00) (115/76 - 157/97)  RR: 18 (10 [place] 2024 03:00) (17 - 18)  SpO2: 100% (10 [place] 2024 03:00) (96% - 100%)    Parameters below as of 10 [place] 2024 03:00  Patient On (Oxygen Delivery Method): room air    CONSTITUTIONAL: NAD   EENT: MMM, supple  RESPIRATORY: Normal respiratory effort; lungs are clear to auscultation bilaterally  CARDIOVASCULAR: Regular rate and rhythm, normal S1 and S2, no murmur/rub/gallop  ABDOMEN: Nontender to palpation, no rebound/guarding   MUSCULOSKELETAL: No lower extremity edema   PSYCH: A+O to person, place, and time; affect appropriate  NEUROLOGY: moves all extremities     plan:  -ekg  -cbc, bmp, cardiac enzymes  -chest xr    will continue to monitor overnight.    Azucena Colin, [facility]  Medicine [id number]
ACP NIGHT COVERAGE    Notified by RN, that patient's weight in the ED was recorded at 219 kg, patient's weight checked on the floor found to be 101 kgs.  Heparin gtt reordered.  - f/u PTT  - Will monitor for signs of bleeding    Jarrett Arellano PA-C  [id number]
Patient had increased troponins from 100 to 386. The CKMB is also elevated. Patient is high risk for NSTEMI given hx of multiple CABGs and PCIs in the past.    The patient at bedside denies new chest pain. EKG obtained showing JEANETTE on V2-V3, but overall similar to the EKG from 2020. Will repeat a set of troponins and CK/CKMB now.    I spoke with the Cards attending directly, Dr. Chun, for which during my discussion about the case, has been notified about the elevated troponins. I emphasized that the patient may benefit from a LHC today given worsening troponins with multiple risk factors. He said he will reassess and will provide recommendations after evaluation.    Day team to follow up.
alerted by RN that pt c/o chest pain. evaluated pt at bedside with [employer] speaking employee. pt c/o 4/10 left shoulder pain around ICD site. pt states she has this pain intermittently. pt states pain is improving. pt went for ICD interrogation yesterday.    Vital Signs Last 24 Hrs  T(C): 36.7 (10 [place] 2024 21:15), Max: 36.9 (10 [place] 2024 05:12)  T(F): 98.1 (10 [place] 2024 21:15), Max: 98.4 (10 [place] 2024 05:12)  HR: 77 (10 [place] 2024 21:15) (70 - 81)  BP: 122/65 (10 [place] 2024 21:15) (122/65 - 153/94)  RR: 17 (10 [place] 2024 21:15) (17 - 18)  SpO2: 100% (10 [place] 2024 21:15) (100% - 100%)    Parameters below as of 10 [place] 2024 21:15  Patient On (Oxygen Delivery Method): room air    CONSTITUTIONAL: NAD   EENT: MMM, supple  RESPIRATORY: Normal respiratory effort; lungs are clear to auscultation bilaterally  CARDIOVASCULAR: Regular rate and rhythm, normal S1 and S2, no murmur/rub/gallop  ABDOMEN: Nontender to palpation, no rebound/guarding   MUSCULOSKELETAL: No lower extremity edema   PSYCH: A+O to person, place, and time; affect appropriate  NEUROLOGY: moves all extremities     plan:  -ekg  -cbc, bmp, cardiac enzymes  -pain control    will continue to monitor overnight.    JOSE MARIA Sorto-BC  Medicine [id number].

## 2024-06-11 NOTE — PHARMACOTHERAPY INTERVENTION NOTE - COMMENTS
Medication history update: Spoke with ACP provider to notify OMR updated and can be reconciled.  Please call spectra [phone] if you have any questions.

## 2024-06-11 NOTE — CONSULT NOTE ADULT - ASSESSMENT
74 year old female with PMH of severe CAD with MI s/p CABG x3 (LIMA-LAD, SVG-OM1, SVG-RCA in 2008 at [facility], multiple PCI with total 16 stents, recent MI in 4 /2024 s/p LHC (no PCI) at [facility], chronic HFrEF, history of VT s/p ICD (single chamber ST Wisam Fortify VR 1231/221806 by Dr. Johnson initial implant in 2012 complicated by lead malfunction and reimplant in 02/2013) HTN, HLD, DMT2 who presents to the [facility] with c/o 2 episodes of 10/10 crushing substernal chest pain.  Cardiology consulted for STEMI/WCT with  and elevated Trop.   ICD interrogation revealed normal device function no ICD shocks or pacing noted.  ICD battery is 7.1 months noted.   Patient endorses "upper body postural induced" severe chest pain over ICD pocket area intermittently since the implant on 2/13/2013. Pain relieves with morphine.  She denies CP/SOB or palpitations or syncope.  Never had ICD shocks.   Telemetry and EKG demonstrates SR with PVC's with wide QRS at 148ms, no VT or AF seen. Echo revealed LVEF 37%, moderate to severe mitral regurgitation.     Considering patient's significant ICD pocket pain (?ICD compressing over breast tissue), patient likely will benefit from pocket revision in addition to her generator change as her battery is approaching elective replacement interval.      Plan for an elective pocket revision with ICD generator change on 6/17 per Dr. Elliott,   patient and her daughter are amenable to the procedure, will notify patient tomorrow regarding the schedule time  -No contraindication from EP standpoint for discharge, discussed with Dr. Elliott  -Spoke to primary team

## 2024-06-12 ENCOUNTER — TRANSCRIPTION ENCOUNTER (OUTPATIENT)
Age: 74
End: 2024-06-12

## 2024-06-12 VITALS
OXYGEN SATURATION: 100 % | SYSTOLIC BLOOD PRESSURE: 128 MMHG | RESPIRATION RATE: 18 BRPM | HEART RATE: 74 BPM | DIASTOLIC BLOOD PRESSURE: 70 MMHG | TEMPERATURE: 98 F

## 2024-06-12 DIAGNOSIS — T82.9XXA UNSPECIFIED COMPLICATION OF CARDIAC AND VASCULAR PROSTHETIC DEVICE, IMPLANT AND GRAFT, INITIAL ENCOUNTER: ICD-10-CM

## 2024-06-12 LAB
ANION GAP SERPL CALC-SCNC: 11 MMOL/L — SIGNIFICANT CHANGE UP (ref 7–14)
BUN SERPL-MCNC: 17 MG/DL — SIGNIFICANT CHANGE UP (ref 7–23)
CALCIUM SERPL-MCNC: 9 MG/DL — SIGNIFICANT CHANGE UP (ref 8.4–10.5)
CHLORIDE SERPL-SCNC: 107 MMOL/L — SIGNIFICANT CHANGE UP (ref 98–107)
CO2 SERPL-SCNC: 23 MMOL/L — SIGNIFICANT CHANGE UP (ref 22–31)
CREAT SERPL-MCNC: 0.88 MG/DL — SIGNIFICANT CHANGE UP (ref 0.5–1.3)
EGFR: 69 ML/MIN/1.73M2 — SIGNIFICANT CHANGE UP
GLUCOSE BLDC GLUCOMTR-MCNC: 121 MG/DL — HIGH (ref 70–99)
GLUCOSE BLDC GLUCOMTR-MCNC: 131 MG/DL — HIGH (ref 70–99)
GLUCOSE SERPL-MCNC: 100 MG/DL — HIGH (ref 70–99)
HCT VFR BLD CALC: 41 % — SIGNIFICANT CHANGE UP (ref 34.5–45)
HGB BLD-MCNC: 13.2 G/DL — SIGNIFICANT CHANGE UP (ref 11.5–15.5)
MAGNESIUM SERPL-MCNC: 2 MG/DL — SIGNIFICANT CHANGE UP (ref 1.6–2.6)
MCHC RBC-ENTMCNC: 28.9 PG — SIGNIFICANT CHANGE UP (ref 27–34)
MCHC RBC-ENTMCNC: 32.2 GM/DL — SIGNIFICANT CHANGE UP (ref 32–36)
MCV RBC AUTO: 89.9 FL — SIGNIFICANT CHANGE UP (ref 80–100)
NRBC # BLD: 0 /100 WBCS — SIGNIFICANT CHANGE UP (ref 0–0)
NRBC # FLD: 0 K/UL — SIGNIFICANT CHANGE UP (ref 0–0)
PHOSPHATE SERPL-MCNC: 4.2 MG/DL — SIGNIFICANT CHANGE UP (ref 2.5–4.5)
PLATELET # BLD AUTO: 212 K/UL — SIGNIFICANT CHANGE UP (ref 150–400)
POTASSIUM SERPL-MCNC: 4.1 MMOL/L — SIGNIFICANT CHANGE UP (ref 3.5–5.3)
POTASSIUM SERPL-SCNC: 4.1 MMOL/L — SIGNIFICANT CHANGE UP (ref 3.5–5.3)
RBC # BLD: 4.56 M/UL — SIGNIFICANT CHANGE UP (ref 3.8–5.2)
RBC # FLD: 14.3 % — SIGNIFICANT CHANGE UP (ref 10.3–14.5)
SODIUM SERPL-SCNC: 141 MMOL/L — SIGNIFICANT CHANGE UP (ref 135–145)
WBC # BLD: 6.69 K/UL — SIGNIFICANT CHANGE UP (ref 3.8–10.5)
WBC # FLD AUTO: 6.69 K/UL — SIGNIFICANT CHANGE UP (ref 3.8–10.5)

## 2024-06-12 PROCEDURE — 99232 SBSQ HOSP IP/OBS MODERATE 35: CPT | Mod: GC

## 2024-06-12 PROCEDURE — 99239 HOSP IP/OBS DSCHRG MGMT >30: CPT

## 2024-06-12 PROCEDURE — 99232 SBSQ HOSP IP/OBS MODERATE 35: CPT

## 2024-06-12 RX ORDER — ATORVASTATIN CALCIUM 80 MG/1
1 TABLET, FILM COATED ORAL
Qty: 30 | Refills: 0
Start: 2024-06-12 | End: 2024-07-11

## 2024-06-12 RX ORDER — ISOSORBIDE MONONITRATE 60 MG/1
1 TABLET, EXTENDED RELEASE ORAL
Qty: 30 | Refills: 0
Start: 2024-06-12 | End: 2024-07-11

## 2024-06-12 RX ORDER — OXYCODONE HYDROCHLORIDE 5 MG/1
1 TABLET ORAL
Qty: 12 | Refills: 0
Start: 2024-06-12 | End: 2024-06-14

## 2024-06-12 RX ORDER — NALOXONE HYDROCHLORIDE 4 MG/.1ML
1 SPRAY NASAL
Qty: 1 | Refills: 0
Start: 2024-06-12 | End: 2024-06-12

## 2024-06-12 RX ORDER — DAPAGLIFLOZIN 10 MG/1
1 TABLET, FILM COATED ORAL
Qty: 30 | Refills: 0
Start: 2024-06-12 | End: 2024-07-11

## 2024-06-12 RX ORDER — FUROSEMIDE 40 MG
40 TABLET ORAL DAILY
Refills: 0 | Status: DISCONTINUED | OUTPATIENT
Start: 2024-06-12 | End: 2024-06-12

## 2024-06-12 RX ORDER — TIRZEPATIDE 15 MG/.5ML
7.5 INJECTION, SOLUTION SUBCUTANEOUS
Qty: 4 | Refills: 0
Start: 2024-06-12 | End: 2024-07-11

## 2024-06-12 RX ORDER — SPIRONOLACTONE 25 MG/1
1 TABLET, FILM COATED ORAL
Qty: 30 | Refills: 0
Start: 2024-06-12 | End: 2024-07-11

## 2024-06-12 RX ORDER — FUROSEMIDE 40 MG
1 TABLET ORAL
Qty: 30 | Refills: 0
Start: 2024-06-12 | End: 2024-07-11

## 2024-06-12 RX ORDER — SPIRONOLACTONE 25 MG/1
1 TABLET, FILM COATED ORAL
Qty: 0 | Refills: 0 | DISCHARGE
Start: 2024-06-12

## 2024-06-12 RX ADMIN — ISOSORBIDE MONONITRATE 30 MILLIGRAM(S): 60 TABLET, EXTENDED RELEASE ORAL at 11:18

## 2024-06-12 RX ADMIN — PANTOPRAZOLE SODIUM 40 MILLIGRAM(S): 20 TABLET, DELAYED RELEASE ORAL at 05:28

## 2024-06-12 RX ADMIN — Medication 81 MILLIGRAM(S): at 11:18

## 2024-06-12 RX ADMIN — CLOPIDOGREL BISULFATE 75 MILLIGRAM(S): 75 TABLET, FILM COATED ORAL at 11:18

## 2024-06-12 RX ADMIN — Medication 40 MILLIGRAM(S): at 05:28

## 2024-06-12 RX ADMIN — Medication 25 MICROGRAM(S): at 05:28

## 2024-06-12 RX ADMIN — Medication 100 MILLIGRAM(S): at 05:27

## 2024-06-12 RX ADMIN — SACUBITRIL AND VALSARTAN 1 TABLET(S): 24; 26 TABLET, FILM COATED ORAL at 05:27

## 2024-06-12 RX ADMIN — Medication 10 MILLIGRAM(S): at 11:19

## 2024-06-12 RX ADMIN — SPIRONOLACTONE 25 MILLIGRAM(S): 25 TABLET, FILM COATED ORAL at 05:27

## 2024-06-12 NOTE — DISCHARGE NOTE PROVIDER - NSDCCPCAREPLAN_GEN_ALL_CORE_FT
PRINCIPAL DISCHARGE DIAGNOSIS  Diagnosis: Acute chest pain  Assessment and Plan of Treatment: You were admitted to the hospital due to chest pain. You were seen by cardiology and given a blood thinner for 48 hours. A repeat cardiac catheterization/angiogram was not performed. Your medications were adjusted. You were seen by the electrophysiology team and recommended to follow up next week for an AICD generator change and revision.

## 2024-06-12 NOTE — PROGRESS NOTE ADULT - PROBLEM SELECTOR PLAN 1
Assessment:  - the patient presented for new onset chest pain that is atypical in nature  - troponins elevated and rising, EKG shows aflutter, no ST changes noted   - chest pain free at bedside currently     Plan:  - tele monitoring  - cardiology consult - no plan for C at this time - records from [facility] were received and reviewed through the patient portal  - consideration for ICD lead as cause of symptoms - will be performed next week on an outpatient basis  - c/w DAPT, s/p heparin drip x48h  - patient instructed to notify RN and primary team if there are new onset chest pain  - metoprolol adjusted to succinate dose; resumed home Imdur and Entresto, can resume SGLT2 inhibitor on discharge    Agree with cardiology that there are likely two etiologies of chest pain - true angina related to her heavy atherosclerotic burden and musculoskeletal pain partially related to the location of her ICD

## 2024-06-12 NOTE — PROGRESS NOTE ADULT - ASSESSMENT
75 y/o F with pmhx of CAD with prior 3VCABG 2013 (LIMA-LAD, SVG-OM1, SVG-RCA (all patent by angiogram 2013), multiple PCIs most recent intervention s/p MATTHEW proximal ramus 2/2016 @ [facility], chronic systolic CHF, history of VT s/p ICD, recent heart attack in April (managed at [facility]), HTN, HLD, DM2, presented to the ED for chest pain. Admit for ACS work up.
Patient is a 74 year old female with PMHx known CAD with prior 3VCABG 2013 (LIMA-LAD, SVG-OM1, SVG-RCA (all patent by angiogram 2013), multiple PCIs most recent intervention s/p MATTHEW proximal ramus 2/2016 @ [facility], chronic systolic CHF, history of VT s/p ICD, recent heart attack in April (managed at [facility]), HTN, HLD, DM2 who presents to the [facility] emergency department with chief complaint of 2 episodes of 10/10 crushing substernal chest pain with radiation to the left upper extremity starting at 0400 on day of arrival 6/8, second episode around 1400 on day of arrival 6/8/24.     The patient and family at bedside endorse the pain to be predominantly over the left pectoral/clavicle.  Per history, the patient has had issues with the ICD placed on that side which has required a lead revision in the past, and has had discomfort there ever since.  Interrogation of the device w/ EP today with ~7 months battery life remaining.  Although MSK/nerve pain from an ICD lead may be contributing to the patients pain, would not expect to see troponin elevations to this degree.  She does exhibit some symptoms on exertion which are likely anginal equivalents, but described by family as only being related to the ICD.  Per report, she has had numerous stents in the past and numerous catheterizations within the past year w/o intervention- the daughter states one graft is completely occluded and the other may have a 60% occlusion from last year, but she just had a cath at [place] in april of this year with reportedly recommendations for medical management    - c/w asa, plavix, high intensity statin   - c/w heparin for now  - can change metoprolol to once daily XL formulation  - TTE pending  - appreciate EP eval for eventual gen change/revision  - please obtain cath results from [place] from april of this year. no immediate plans for cath, but will need to review reports prior to final plans    Vincent Shearer MD  PGY-4, Cardiology Fellow    Please check [email or web address] password: [email or web address]" for cardiology service schedule and contact information.   *Please note that all recommendations are incomplete until attending attestation*
Impression and Recommendations:   74-year-old woman with coronary artery disease, prior CABG and multiple PCI, with recent coronary angiography that demonstrated an occluded SVG-RCA for which medical therapy was recommended. She presents with recurrent chest pain, though her daughter describes this distinct from her prior angina. She was noted to have significantly elevated hs-troponin and pro-BNP and was initially treated for ACS.    Recent coronary angiography report is highly suggestive that this patient has severe underlying coronary disease that is not amenable to revascularization. We discussed the utility of further angiography with the patient and her daughter and our plans to intensify medical therapy at this time.     Continue aspirin, clopidogrel and atorvastatin for her known coronary disease. Metoprolol should be titrated to a resting HR in the 50s. I would restart her outpatient isosorbide. Ranolazine can also be considered if the patient's chest pain is uncontrolled.     EP is planning to revise perform a generator change and device revision on Monday next week.     Metoprolol should be consolidated to succinate given her LV function. Continue her sacubitril-valsartan and spironolactone. Consider starting an SGLT2-inhibitor as an outpatient.    Darinel Jackson MD FACC FACP  Cardiology  [phone] 
74 year old female with PMH of severe CAD with MI s/p CABG x3 (LIMA-LAD, SVG-OM1, SVG-RCA in 2008 at [facility], multiple PCI with total 16 stents, recent MI in 4 /2024 s/p LHC (no PCI) at [facility], chronic HFrEF, history of VT s/p ICD (single chamber ST Wisam Fortify VR 1231/003896 by Dr. Johnson initial implant in 2012 complicated by lead malfunction and reimplant in 02/2013) HTN, HLD, DMT2 who presented to [facility] with c/o 2 episodes of 10/10 crushing substernal chest pain. Cardiology consulted for STEMI/WCT with  and elevated Trop. ICD interrogation revealed normal device function no ICD shocks or pacing noted. ICD battery is 7.1 months noted.   Patient endorses chest pain over ICD pocket area intermittently. Patient recently had an MI and was admitted to [facility] in 4/2024. She never had ICD shocks.   Telemetry and EKG demonstrates SR with PVC's with wide QRS at 148ms, no VT or AF seen. Echo revealed LVEF 37%, moderate to severe mitral regurgitation.     Considering patient's c/o ICD pocket pain (?ICD compressing over breast tissue), patient likely will benefit from pocket revision in addition to her generator change as her battery is approaching elective replacement interval.      Plan for an elective pocket revision with ICD generator change on 6/17 per Dr. Elliott,   - patient and her daughter are amenable to the procedure  -No contraindication from EP standpoint for discharge, discussed with Dr. Elliott  -Spoke to primary team
Patient is a 74 year old female with PMHx known CAD with prior 3VCABG 2013 (LIMA-LAD, SVG-OM1, SVG-RCA (all patent by angiogram 2013), multiple PCIs most recent intervention s/p MATTHEW proximal ramus 2/2016 @ [facility], chronic systolic CHF, history of VT s/p ICD, recent heart attack in April (managed at [facility]), HTN, HLD, DM2 who presents to the [facility] emergency department with chief complaint of 2 episodes of 10/10 crushing substernal chest pain with radiation to the left upper extremity starting at 0400 on day of arrival 6/8, second episode around 1400 on day of arrival 6/8/24.     The patient and family at bedside endorse the pain to be predominantly over the left pectoral/clavicle.  Per history, the patient has had issues with the ICD placed on that side which has required a lead revision in the past, and has had discomfort there ever since.  Interrogation of the device w/ EP today with ~7 months battery life remaining.  Although MSK/nerve pain from an ICD lead may be contributing to the patients pain, would not expect to see troponin elevations to this degree.  She does exhibit some symptoms on exertion which are likely anginal equivalents, but described by family as only being related to the ICD.  Per report, she has had numerous stents in the past and numerous catheterizations within the past year w/o intervention- the daughter states one graft is completely occluded and the other may have a 60% occlusion from last year, but she just had a cath at [place] in april of this year with reportedly recommendations for medical management  Reviewed report of [facility] from [place]- occluded SVG-RCA, diffusely disease SVG-OM1 that was recommended for medical therapy  TTE here with reduced EF ~37% with regional WMA    - after extensive conversation with patient and family including shared medical decision making, recommend medical therapy at this time  - c/w asa, plavix, high intensity statin   - can change metoprolol to home Metoprolol succinate 100mg qd  - continue home imdur 30mg qd  - restart home Entresto 24-26mg BID  - appreciate EP eval for eventual gen change/revision      Vincent Shearer MD  PGY-4, Cardiology Fellow    Please check [email or web address] password: [email or web address]" for cardiology service schedule and contact information.   *Please note that all recommendations are incomplete until attending attestation*
75 y/o F with pmhx of CAD with prior 3VCABG 2013 (LIMA-LAD, SVG-OM1, SVG-RCA (all patent by angiogram 2013), multiple PCIs most recent intervention s/p MATTHEW proximal ramus 2/2016 @ [facility], chronic systolic CHF, history of VT s/p ICD, recent heart attack in April (managed at [facility]), HTN, HLD, DM2, presented to the ED for chest pain. Admit for ACS work up.
75 y/o F with pmhx of CAD with prior 3VCABG 2013 (LIMA-LAD, SVG-OM1, SVG-RCA (all patent by angiogram 2013), multiple PCIs most recent intervention s/p MATTHEW proximal ramus 2/2016 @ [facility], chronic systolic CHF, history of VT s/p ICD, recent heart attack in April (managed at [facility]), HTN, HLD, DM2, presented to the ED for chest pain. Admit for ACS work up.
75 y/o F with pmhx of CAD with prior 3VCABG 2013 (LIMA-LAD, SVG-OM1, SVG-RCA (all patent by angiogram 2013), multiple PCIs most recent intervention s/p MATTHEW proximal ramus 2/2016 @ [facility], chronic systolic CHF, history of VT s/p ICD, recent heart attack in April (managed at [facility]), HTN, HLD, DM2, presented to the ED for chest pain. Admit for ACS work up.

## 2024-06-12 NOTE — PROGRESS NOTE ADULT - PROBLEM SELECTOR PLAN 7
- heparin drip for dvt ppx
- patient does not know her meds, I called daughter who also does not know the meds  - I used surescripts to find her meds from her alternate MRN as listed above  - will hold all meds until confirmed to be correct by [facility] pharmacy
- SCDs    Dispo: dc to home. Outpt f/u with cardiology and EP. PRN analgesia for a short period to be prescribed on discharge.
- patient does not know her meds, I called daughter who also does not know the meds  - I used surescripts to find her meds from her alternate MRN as listed above  - will hold all meds until confirmed to be correct by [facility] pharmacy

## 2024-06-12 NOTE — PHARMACOTHERAPY INTERVENTION NOTE - COMMENTS
Discharge medications reviewed with patient's daughter over the phone. Outpatient medication schedule was discussed in detail including: medication name, indication, dose, administration times, treatment duration, side effects and special instructions. Also discussed heart failure diagnosis, fluid and salt restrictions, and maintaining a log of daily weights. Also discussed warning signs of fluid overload (SOB, orthopnea, BROWN, edema, etc.). Patient's daughter verbalized understanding. Patient provided with educational booklet at bedside.    Jory Marmolejo, PharmD, Clinical Pharmacy Specialist, [phone]

## 2024-06-12 NOTE — DISCHARGE NOTE PROVIDER - NSDCMRMEDTOKEN_GEN_ALL_CORE_FT
amLODIPine 5 mg oral tablet: 1 tab(s) orally once a day  aspirin 81 mg oral delayed release tablet: 1 tab(s) orally once a day  atorvastatin 40 mg oral tablet: 1 tab(s) orally once a day (at bedtime)  atorvastatin 80 mg oral tablet: 1 tab(s) orally once a day (at bedtime)  clopidogrel 75 mg oral tablet: 1 tab(s) orally once a day  Entresto 24 mg-26 mg oral tablet: 1 tab(s) orally 2 times a day  Farxiga 5 mg oral tablet: 1 tab(s) orally once a day  FLUoxetine 10 mg oral tablet: 1 tab(s) orally once a day  isosorbide dinitrate 30 mg oral tablet: 1 tab(s) orally every 8 hours  isosorbide mononitrate 30 mg oral tablet, extended release: 1 tab(s) orally once a day  levothyroxine 25 mcg (0.025 mg) oral tablet: 1 tab(s) orally once a day (current medication per patient/daughter; per pharmacy last dispensed 12/4/23)  metFORMIN 500 mg oral tablet: 1 tab(s) orally 2 times a day  metoprolol succinate 100 mg oral tablet, extended release: 1 tab(s) orally once a day  Mounjaro 7.5 mg/0.5 mL subcutaneous solution: 7.5 milligram(s) subcutaneously once a week on Thursday  nitroglycerin 0.4 mg sublingual tablet: 1 tab(s) sublingually every 5 minutes as needed for  chest pain up to 3 doses  pantoprazole 40 mg oral delayed release tablet: 1 tab(s) orally once a day  spironolactone 25 mg oral tablet: 1 tab(s) orally once a day  Xiidra 5% ophthalmic solution: 1 drop(s) in each eye 2 times a day as needed for  dry eyes   aspirin 81 mg oral delayed release tablet: 1 tab(s) orally once a day  atorvastatin 80 mg oral tablet: 1 tab(s) orally once a day (at bedtime)  clopidogrel 75 mg oral tablet: 1 tab(s) orally once a day  Entresto 24 mg-26 mg oral tablet: 1 tab(s) orally 2 times a day  Farxiga 5 mg oral tablet: 1 tab(s) orally once a day  FLUoxetine 10 mg oral tablet: 1 tab(s) orally once a day  isosorbide mononitrate 30 mg oral tablet, extended release: 1 tab(s) orally once a day  Lasix 40 mg oral tablet: 1 tab(s) orally once a day  levothyroxine 25 mcg (0.025 mg) oral tablet: 1 tab(s) orally once a day (current medication per patient/daughter; per pharmacy last dispensed 12/4/23)  metFORMIN 500 mg oral tablet: 1 tab(s) orally 2 times a day  metoprolol succinate 100 mg oral tablet, extended release: 1 tab(s) orally once a day  Mounjaro 7.5 mg/0.5 mL subcutaneous solution: 7.5 milligram(s) subcutaneously once a week on Thursday  nitroglycerin 0.4 mg sublingual tablet: 1 tab(s) sublingually every 5 minutes as needed for  chest pain up to 3 doses  oxyCODONE 5 mg oral tablet: 1 tab(s) orally every 8 hours take one tablet for moderate pain as needed and two tablets for severe pain as needed MDD: 6 pills  pantoprazole 40 mg oral delayed release tablet: 1 tab(s) orally once a day  spironolactone 25 mg oral tablet: 1 tab(s) orally once a day  spironolactone 25 mg oral tablet: 1 tab(s) orally once a day  Xiidra 5% ophthalmic solution: 1 drop(s) in each eye 2 times a day as needed for  dry eyes   aspirin 81 mg oral delayed release tablet: 1 tab(s) orally once a day  atorvastatin 80 mg oral tablet: 1 tab(s) orally once a day (at bedtime)  clopidogrel 75 mg oral tablet: 1 tab(s) orally once a day  Entresto 24 mg-26 mg oral tablet: 1 tab(s) orally 2 times a day  Farxiga 5 mg oral tablet: 1 tab(s) orally once a day  FLUoxetine 10 mg oral tablet: 1 tab(s) orally once a day  isosorbide mononitrate 30 mg oral tablet, extended release: 1 tab(s) orally once a day  Lasix 40 mg oral tablet: 1 tab(s) orally once a day  levothyroxine 25 mcg (0.025 mg) oral tablet: 1 tab(s) orally once a day (current medication per patient/daughter; per pharmacy last dispensed 12/4/23)  metFORMIN 500 mg oral tablet: 1 tab(s) orally 2 times a day  metoprolol succinate 100 mg oral tablet, extended release: 1 tab(s) orally once a day  Mounjaro 7.5 mg/0.5 mL subcutaneous solution: 7.5 milligram(s) subcutaneously once a week once a week on Thursday  Narcan 4 mg/0.1 mL nasal spray: 1 spray(s) intranasally every 2 to 3 minutes as needed for opioid overdose. If suspected overdose, call 911  nitroglycerin 0.4 mg sublingual tablet: 1 tab(s) sublingually every 5 minutes as needed for  chest pain up to 3 doses  oxyCODONE 5 mg oral tablet: 1 tab(s) orally every 8 hours take one tablet for moderate pain as needed and two tablets for severe pain as needed MDD: 6 pills  pantoprazole 40 mg oral delayed release tablet: 1 tab(s) orally once a day  spironolactone 25 mg oral tablet: 1 tab(s) orally once a day  Xiidra 5% ophthalmic solution: 1 drop(s) in each eye 2 times a day as needed for  dry eyes

## 2024-06-12 NOTE — DISCHARGE NOTE PROVIDER - CARE PROVIDER_API CALL
Amy Elliott  Cardiac Electrophysiology  [address] [address] [place] [address], [address]  [place] [address]  Phone: [phone]  Fax: [phone]  Follow Up Time:    Amy Elliott  Cardiac Electrophysiology  [address] [place] [address], [address]  [place] [address]  Phone: [phone]  Fax: [phone]  Follow Up Time:     Darinel Jackson  Cardiology  [address] [place] [address], [address]   [place] [address]  Phone: [phone]  Fax: [phone]  Follow Up Time:

## 2024-06-12 NOTE — PROGRESS NOTE ADULT - PROBLEM SELECTOR PLAN 3
- low dose sliding scale insulin for now

## 2024-06-12 NOTE — PROGRESS NOTE ADULT - PROBLEM SELECTOR PROBLEM 6
Acute systolic heart failure

## 2024-06-12 NOTE — DISCHARGE NOTE PROVIDER - NSDCCPTREATMENT_GEN_ALL_CORE_FT
PRINCIPAL PROCEDURE  Procedure: 2D echo  Findings and Treatment: 1. Technically difficult image quality.   2. The left ventricular cavity is normal in size. Left ventricular wall thickness is normal. Left ventricular systolic function is moderately decreased with a calculated ejection fraction of 37 % by the García's biplane method of disks. There are regional wall motion abnormalities present. Hypokinesis of the inferolateral wall, basal to mid inferior wall, and basal inferoseptum.   3. Structurally normal mitral valve with normal leaflet excursion. There is calcification of the mitral valve annulus. There is moderate to severe mitral regurgitation.   4. No prior echocardiogram is available for comparison.        SECONDARY PROCEDURE  Procedure: CTA chest w/w/o contrast  Findings and Treatment: FINDINGS: Patient's respiratory motion degrades images.  LUNGS AND AIRWAYS: Patent central airways. Peribronchial thickening with  interlobar septal thickening and groundglass opacities in both lungs.   Scattered linear opacities, atelectasis.  PLEURA: No pleural effusion or pneumothorax.  HEART: Borderline heart size. No pericardial effusion. Coronary artery   calcification/stent.  VESSELS: Suboptimal contrast opacification of the subsegmental pulmonary   arteries. No obvious embolus to the level of the segmental pulmonary   arteries. Atherosclerosis. Normal caliber of the thoracic aorta. CABG.  MEDIASTINUM AND ELISE: Subcentimeter lymph nodes.  CHEST WALL AND LOWER NECK: Median sternotomy. Left-sided AICD.  UPPER ABDOMEN: Question periportal edema. Prominent intrahepatic and   common bile ducts, reflecting postcholecystectomy state. Fatty atrophy of   the visualized pancreas. Prominent visualized proximal renal collecting   system with perinephric stranding.  BONES: Degenerative changes of the spine.  IMPRESSION:  Suboptimal evaluation of the subsegmental pulmonary arteries. No obvious   embolus to the level of the segmental pulmonary arteries.  Pulmonary findings as described, which can be seen in noninfectious   (pulmonary edema) and infectious processes.  Additional findings as described.

## 2024-06-12 NOTE — PROGRESS NOTE ADULT - PROBLEM SELECTOR PLAN 2
No prior hx of Afib, CHADs-VASc would be 4-5. Questionable Afib on telemetry initially. but not seen on EKG.   Unclear if Afib vs known hx of VT s/p AICD. No episodes of Afib on tele while on medicine floor.  EP interrogated ICD.    EP will perform ICD generator change and potential lead revision on an outpatient basis next week.

## 2024-06-12 NOTE — DISCHARGE NOTE NURSING/CASE MANAGEMENT/SOCIAL WORK - PATIENT PORTAL LINK FT
You can access the FollowMyHealth Patient Portal offered by [facility] by registering at the following website: [email or web address]. By joining [employer]’s FollowMyHealth portal, you will also be able to view your health information using other applications (apps) compatible with our system.

## 2024-06-12 NOTE — PROVIDER CONTACT NOTE (OTHER) - SITUATION
Patient complaining of left upper chest pain and shoulder pain around ICD site.
Patient still experiencing chest pain, 7-8/10. Patient also had 15 beats of vtach
Patient experiencing chest pain 5/10. Patient states that the pain is localized around ICD

## 2024-06-12 NOTE — DISCHARGE NOTE PROVIDER - PROVIDER TOKENS
PROVIDER:[TOKEN:[[id number]]] PROVIDER:[TOKEN:[[id number]]],PROVIDER:[TOKEN:[[id number]]]

## 2024-06-12 NOTE — DISCHARGE NOTE PROVIDER - POSTFACE STATEMENT FOR MINUTES SPENT
Internal Medicine Progress Note                                                                  Patient Name: Gerardo Goldberg    YOB: 1964    MRN: [id number]         Date of Service: 10/24/2020     24 hr Events / Subjective     Pt seen and examined at bedside.  No acute events overnight  Endorses mild nausea, denies any vomiting  Has not urinated and does not feel the need to      ROS: Denies fever, chills, diaphoresis, HA, CP, palpitations, SOB, wheezing, abdominal pain, N/V/D/C, hematochezia, dysuria, weakness/numbness/tingling.      Objective     Vitals:    10/24/20 0636   BP: 90/61   Pulse: 87   Resp: 16   Temp: 98.4 °F (36.9 °C)       Physical Exam  General: AOx4, NAD, appears stated age, resting in bed   HEENT: normocephalic, atraumatic, EOMI, PERRL  Neck: supple, nontender, no LAD   CV: RRR, +S1 +S2, no murmurs/rubs/gallops. Carotid, radial, and dorsalis pedis pulses +2/4 b/l. No JVD   Lungs: CTAB, no crackles/rhonchi/wheezing   Abdomen: soft, nontender, moderately distended but improved from prior, BS present, liquid stool output, tympanic to percussion. No rigidity, rebound or guarding. Colostomy intact   Extremities: no peripheral edema, clubbing, or cyanosis   Neuro: CN 2-12 grossly intact   Skin: warm, dry, intact      Intake/Output Summary (Last 24 hours) at 10/24/2020 1112  Last data filed at 10/23/2020 2036  Gross per 24 hour   Intake 2505.37 ml   Output --   Net 2505.37 ml        Recent Results (from the past 24 hour(s))   Metered blood glucose    Collection Time: 10/23/20 12:11 PM   Result Value Ref Range    Glucose Bedside  (H) 70 - 99 mg/dL   Lactic Acid, Venous    Collection Time: 10/23/20  1:20 PM   Result Value Ref Range    Lactic Acid Venous 0.9 0 - 2.0 mmol/L   Metered blood glucose    Collection Time: 10/23/20  5:31 PM   Result Value Ref Range    Glucose Bedside  (H) 70 - 99 mg/dL   Metered blood glucose    Collection Time: 10/23/20 11:44 PM   Result Value  Ref Range    Glucose Bedside  (H) 70 - 99 mg/dL   Metered blood glucose    Collection Time: 10/24/20  5:32 AM   Result Value Ref Range    Glucose Bedside  (H) 70 - 99 mg/dL   CBC with Automated Differential    Collection Time: 10/24/20  7:23 AM   Result Value Ref Range    WBC 5.4 4.2 - 11.0 K/mcL    RBC 4.80 4.50 - 5.90 mil/mcL    HGB 12.6 (L) 13.0 - 17.0 g/dL    HCT 38.5 (L) 39.0 - 51.0 %    MCV 80.2 78.0 - 100.0 fl    MCH 26.3 26.0 - 34.0 pg    MCHC 32.7 32.0 - 36.5 g/dL    RDW-CV 13.3 11.0 - 15.0 %     140 - 450 K/mcL    NRBC 0 0 /100 WBC    DIFF TYPE AUTOMATED DIFFERENTIAL     Neutrophil 68 %    LYMPH 8 %    MONO 18 %    EOSIN 6 %    BASO 0 %    Percent Immature Granuloctyes 0 %    Absolute Neutrophil 3.7 1.8 - 7.7 K/mcL    Absolute Lymph 0.4 (L) 1.0 - 4.0 K/mcL    Absolute Mono 1.0 (H) 0.3 - 0.9 K/mcL    Absolute Eos 0.3 0.1 - 0.5 K/mcL    Absolute Baso 0.0 0.0 - 0.3 K/mcL    Absolute Immature Granulocytes 0.0 0 - 0.2 K/mcl   Comprehensive Metabolic Panel    Collection Time: 10/24/20  7:23 AM   Result Value Ref Range    Sodium 133 (L) 135 - 145 mmol/L    Potassium 3.3 (L) 3.4 - 5.1 mmol/L    Chloride 94 (L) 98 - 107 mmol/L    Carbon Dioxide 32 21 - 32 mmol/L    Anion Gap 10 10 - 20 mmol/L    Glucose 117 (H) 65 - 99 mg/dL    BUN 35 (H) 6 - 20 mg/dL    Creatinine 4.56 (H) 0.67 - 1.17 mg/dL    GFR Estimate,  16     GFR Estimate, Non African American 13     BUN/Creatinine Ratio 8 7 - 25    CALCIUM 8.7 8.4 - 10.2 mg/dL    TOTAL BILIRUBIN 0.9 0.2 - 1.0 mg/dL    AST/SGOT 14 <38 Units/L    ALT/SGPT 13 <64 Units/L    ALK PHOSPHATASE 63 45 - 117 Units/L    TOTAL PROTEIN 7.6 6.4 - 8.2 g/dL    Albumin 2.8 (L) 3.6 - 5.1 g/dL    GLOBULIN 4.8 (H) 2.0 - 4.0 g/dL    A/G Ratio, Serum 0.6 (L) 1.0 - 2.4   Magnesium    Collection Time: 10/24/20  7:23 AM   Result Value Ref Range    MAGNESIUM 2.4 1.7 - 2.4 mg/dL   Phosphorus    Collection Time: 10/24/20  7:23 AM   Result Value Ref Range     PHOSPHORUS 4.2 2.4 - 4.7 mg/dL       Current Facility-Administered Medications   Medication Dose Route Frequency Provider Last Rate Last Dose   • potassium CHLORIDE 40 mEq in sodium chloride 0.9 % 250 mL IVPB  40 mEq Intravenous Once Melonie Blackwell PA-C       • sodium chloride (NORMAL SALINE) 0.9 % bolus 500 mL  500 mL Intravenous Once Godfrey Madrigal   Stopped at 10/24/20 0940   • sodium chloride (NORMAL SALINE) 0.9 % bolus 1,000 mL  1,000 mL Intravenous Once Melonie Blackwell PA-C       • sodium chloride 0.9% infusion   Intravenous Continuous Ninfa Lopez MD       • acetaminophen (TYLENOL) tablet 1,000 mg  1,000 mg Oral 3 times per day Sharri Aldana PA-C   1,000 mg at 10/24/20 0630   • oxyCODONE (IMM REL) (ROXICODONE) tablet 5 mg  5 mg Oral Q4H PRN Sharri Aldana PA-C   5 mg at 10/20/20 2110   • morphine injection 2 mg  2 mg Intravenous Q2H PRN Sharri Aldana PA-C   2 mg at 10/22/20 0150   • ondansetron (ZOFRAN ODT) disintegrating tablet 4 mg  4 mg Oral Q12H PRN Sharri Aldana PA-C        Or   • ondansetron (ZOFRAN) injection 4 mg  4 mg Intravenous Q12H PRN Sharri Aldana PA-C   4 mg at 10/23/20 1016   • heparin (porcine) injection 5,000 Units  5,000 Units Subcutaneous 3 times per day Sharri Aldana PA-C   5,000 Units at 10/24/20 0631   • hydrALAZINE (APRESOLINE) tablet 10 mg  10 mg Oral Q6H PRN Bernabe Canseco MD   10 mg at 10/19/20 1027   • sodium chloride 0.9 % flush bag 25 mL  25 mL Intravenous PRN Sharri Aldana PA-C       • sodium chloride (PF) 0.9 % injection 2 mL  2 mL Intracatheter 2 times per day Sharri Aldana PA-C   2 mL at 10/24/20 1052          Assessment and Plan     Gerardo Goldberg is a 56 year old male PMHx of HTN who presented to the ED on 10/16 with complaints of abdominal distention and bloating for the past 3 weeks    Active Hospital Problems    Diagnosis      JESSICA  - Likely ATN from sepsis and scheduled NSAIDs  - Patient reports bladder retention  - Cr significantly worsened to  4.56    Plan:  - IVF  - consulted neprhology  - Bladder ultrasound and kidney us pending  - Urine sodium, creatinine, osm ordered via straight cath  - Avoid nephrotoxins    Sigmoid volvulus       Hx: several weeks of progressive abdominal Distention +overflow diarrhea  - P/E: ++++ abdominal distention, hypoactive BS  - NGT removed, but has rectal tube to gravity  - CT: Markedly dilated colon with abrupt tapering of the distal sigmoid colon  - GI consulted:  endoscopic decompression completed, with decompression tube to gravity in place  - General surgery consulted:s/p sigmoid resection 10/20/20  - Patient received ostomy care  - Stool output 450 mL last 24 hrs    Plan:   - NPO  - Patient request counseling to assist with acceptance of current diagnosis, HUB consulted  - toradol q6 and acetaminophen q 8 for pain           • Hypertension on admission, now Hypotension     SBP >200 first several days of admission  Has been off home amlodipine for many years  Was on :  Nifedipine 60 mg q12  Lisinopril 40 mg qd  Hydralazine 10mg q6 PRN SBP > 160mmHg  Chlorthalidone 50 mg qd  dc'd all antihypertensives at this point  Pt is asx with BP 90s/60s       • Open wound of left lower leg     Necrotic appearing leg wound  Wound care RN to see, apply wet to dry dressings daily  Pt hemodynamically stable, no fever, no leukocytosis, LA wnl                 FENA  Fluids: None  Electrolytes: replace PRN  Nutrition: NPO  Activity: advance to baseline    PPx:  VTE: heparin 5000 units subQ Q8H  GI: not indicated    Code Status: FULL CODE    Dispo: medical/surgical floor    Pt discussed with Dr Felicita Lopez DO  Resident Physician  Dept of Internal Medicine  [employer]:    minutes on the discharge service.

## 2024-06-12 NOTE — DISCHARGE NOTE PROVIDER - HOSPITAL COURSE
Patient is a 73yo F with PMH of CAD s/p 3V-CABG 2013 (LIMA-LAD, SVG-OM1, SVG-RCA) multiple PCIs most recently s/p DEX pRCA 2/2016, HFrEF, VT s/p AICD, HTN, HLD, and T2DM who presented with recurrent left upper chest wall pain. She was admitted for suspected angina and seen by cardiology. After review of recent catheterization records from [facility], cardiology deferred repeat catheterization. She received a heparin infusion for 48h. Medications for CAD and GDMT were titrated. She was seen by EP to evaluate for a possible ICD lead revision and generator change, with suspicion that this could be contributing to the patient’s musculoskeletal pain. EP recommended outpatient follow-up soon after discharge. She will be given a short supply of pain medication in the interim.    There was suspicion for possible atrial fibrillation but no events were seen on telemetry. Further anticoagulation was discontinued.    She is hemodynamically stable and medically ready for discharge to home. She will need to follow up with her cardiologist, the electrophysiologist, and her primary care physician.

## 2024-06-12 NOTE — PROGRESS NOTE ADULT - PROBLEM SELECTOR PLAN 6
- patient with hx of CHF  - CT shows ground glass opacities and BNP is significant ly elevated, there is LE edema but patient does not have SOB  - will c/w lasix 40mg IV push daily  - monitor I/O, daily weights  - fluid restrict once patient gets [facility]
- patient with hx of CHF  - CT shows ground glass opacities and BNP is significant ly elevated, there is LE edema but patient does not have SOB  - switch furosemide to 40mg po daily on discharge  - monitor I/O, daily weights  - metoprolol adjusted to succinate dose; resumed home Imdur and Entresto
- patient with hx of CHF  - CT shows ground glass opacities and BNP is significant ly elevated, there is LE edema but patient does not have SOB  - will switch furosemide to 40mg po daily  - monitor I/O, daily weights  - metoprolol adjusted to succinate dose; resuming home Imdur and Entresto
- patient with hx of CHF  - CT shows ground glass opacities and BNP is significant ly elevated, there is LE edema but patient does not have SOB  - will start lasix 40mg IV push QD  - monitor I/O, daily weights  - fluid restrict once patient gets [facility]

## 2024-06-12 NOTE — PROVIDER CONTACT NOTE (OTHER) - ASSESSMENT
Patient AxOx4. Patient complaining of 3/10 pain. VSS. /98 HR 88 O2 97. No acute signs of distress noted. Safety maintained.
Patient not complaining of SOB, headache. VSS and in flowsheet

## 2024-06-12 NOTE — PROGRESS NOTE ADULT - PROVIDER SPECIALTY LIST ADULT
Cardiology
Cardiology
Electrophysiology
Cardiology
Internal Medicine
Hospitalist

## 2024-06-12 NOTE — DISCHARGE NOTE NURSING/CASE MANAGEMENT/SOCIAL WORK - NSDCPEFALRISK_GEN_ALL_CORE
For information on Fall & Injury Prevention, visit: [email or web address] OR  [email or web address] OR  https://www.cdc.gov/steadi/patient.html

## 2024-06-12 NOTE — DISCHARGE NOTE PROVIDER - ATTENDING DISCHARGE PHYSICAL EXAMINATION:
PHYSICAL EXAM:  Vital Signs Last 24 Hrs  T(C): 36.4 (12 Jun 2024 05:24), Max: 36.4 (11 Jun 2024 17:00)  T(F): 97.5 (12 Jun 2024 05:24), Max: 97.6 (11 Jun 2024 17:00)  HR: 83 (12 Jun 2024 05:24) (69 - 83)  BP: 151/93 (12 Jun 2024 05:24) (128/80 - 151/93)  BP(mean): --  RR: 18 (12 Jun 2024 05:24) (17 - 18)  SpO2: 97% (12 Jun 2024 05:24) (96% - 98%)    Parameters below as of 12 Jun 2024 05:24  Patient On (Oxygen Delivery Method): room air      CONSTITUTIONAL: NAD, well-groomed  EYES: PERRLA; conjunctiva and sclera clear  ENMT: Moist oral mucosa, no pharyngeal injection or exudates; normal dentition  NECK: Supple, no palpable masses; no thyromegaly  RESPIRATORY: Normal respiratory effort; lungs are clear to auscultation bilaterally  CARDIOVASCULAR: Regular rate and rhythm, normal S1 and S2, no murmur/rub/gallop; No lower extremity edema; Peripheral pulses are 2+ bilaterally  ABDOMEN: Nontender to palpation, normoactive bowel sounds, no rebound/guarding; No hepatosplenomegaly  MUSCULOSKELETAL: no clubbing or cyanosis of digits; no joint swelling or tenderness to palpation  PSYCH: A+O to person, place, and time; affect appropriate  NEUROLOGY: CN 2-12 are intact and symmetric; no gross sensory deficits   SKIN: No rashes; no palpable lesions

## 2024-06-12 NOTE — PROGRESS NOTE ADULT - NS ATTEND AMEND GEN_ALL_CORE FT
74 year old female with PMH of severe CAD with MI s/p CABG x3 (LIMA-LAD, SVG-OM1, SVG-RCA in 2008 at [facility], multiple PCI with total 16 stents, recent MI in 4 /2024 s/p LHC (no PCI) at [facility], chronic HFrEF, history of VT s/p ICD (single chamber ST [employer] Fortify VR [id number] by Dr. Johnson initial implant in 2012 complicated by lead malfunction and reimplant in 02/2013) HTN, HLD, DMT2 who presents to the J with c/o 2 episodes of 10/10 crushing substernal chest pain.     EP consulted for potential pain at ICD site. Device appears to have migrated into the breast. This is chronic however. Speaking with the daughter, the patient has anginal pain as well as this separate pain they believe is related to the location of the ICD. I discussed that it is hard to be sure, but we can do a pocket revision and move the device up and anchor it to see if it improves the pain. She will need a generator to be changed at the time as she is 7 months to [facility]. I can have her come in on Monday to perform as an outpatient. The daughter and patient are agreeable.

## 2024-06-12 NOTE — PROVIDER CONTACT NOTE (OTHER) - BACKGROUND
71 yo female with a past medical history of HTN, hyperlipidemia, diabetes mellitus, hypothyroidism, known CAD with prior 3VCABG 2013, chronic systolic CHF, history of VT s/p ICD, p/w chest pain

## 2024-06-12 NOTE — PROGRESS NOTE ADULT - SUBJECTIVE AND OBJECTIVE BOX
Cardiology Progress Note  ------------------------------------------------------------------------------------------  SUBJECTIVE:   - no current chest pain, SOB, palpitations  - episode of left sided chest/shoulder pain overnight that resolved spontaneously  - no new complaints this AM  -------------------------------------------------------------------------------------------  VS:  T(F): 98.4 (06-10), Max: 98.4 (06-10)  HR: 81 (06-10) (71 - 81)  BP: 153/94 (06-10) (135/78 - 157/97)  RR: 17 (06-10)  SpO2: 100% (06-10)  I&O's Summary    09 Jun 2024 07:01  -  10 [place] 2024 07:00  --------------------------------------------------------  IN: 0 mL / OUT: 300 mL / NET: -300 mL      PHYSICAL EXAM:  GENERAL: NAD  HEAD:  Atraumatic, Normocephalic.  EYES: EOMI, PERRLA, conjunctiva and sclera clear.  ENT: Moist mucous membranes.  NECK: Supple, No JVD.  CHEST/LUNG: Clear to auscultation bilaterally; No rales, rhonchi, wheezing, or rubs. Unlabored respirations.  HEART: Regular rate and rhythm; No murmurs, rubs, or gallops.  ABDOMEN: Bowel sounds present; Soft, Nontender, Nondistended.   EXTREMITIES: No edema. 2+ Peripheral Pulses, brisk capillary refill. No clubbing or cyanosis.   PSYCH: Normal affect.  SKIN: No rashes or lesions.  -------------------------------------------------------------------------------------------  LABS:                          13.8   6.99  )-----------( 229      ( 10 [place] 2024 04:50 )             41.0     06-10    139  |  102  |  20  ----------------------------<  124<H>  4.8   |  20<L>  |  0.98    Ca    9.4      10 [place] 2024 03:23  Phos  5.2     06-10  Mg     2.00     06-10    TPro  6.6  /  Alb  3.4  /  TBili  0.5  /  DBili  x   /  AST  42<H>  /  ALT  25  /  AlkPhos  79  06-09    PT/INR - ( 09 Jun 2024 20:05 )   PT: 10.1 sec;   INR: 0.90 ratio         PTT - ( 10 [place] 2024 03:23 )  PTT:44.8 sec  CARDIAC MARKERS ( 10 [place] 2024 03:23 )  413 ng/L / x     / x     / 155 U/L / x     / 9.6 ng/mL  CARDIAC MARKERS ( 09 Jun 2024 20:05 )  476 ng/L / x     / x     / x     / x     / x      CARDIAC MARKERS ( 09 Jun 2024 13:51 )  657 ng/L / x     / x     / 212 U/L / x     / 24.4 ng/mL  CARDIAC MARKERS ( 09 Jun 2024 07:20 )  461 ng/L / x     / x     / 229 U/L / x     / 27.8 ng/mL  CARDIAC MARKERS ( 09 Jun 2024 04:25 )  386 ng/L / x     / x     / 200 U/L / x     / 25.8 ng/mL            -------------------------------------------------------------------------------------------  Meds:  acetaminophen     Tablet .. 650 milliGRAM(s) Oral every 6 hours PRN  aluminum hydroxide/magnesium hydroxide/simethicone Suspension 30 milliLiter(s) Oral every 4 hours PRN  aspirin enteric coated 81 milliGRAM(s) Oral daily  atorvastatin 80 milliGRAM(s) Oral at bedtime  clopidogrel Tablet 75 milliGRAM(s) Oral daily  dextrose 10% Bolus 125 milliLiter(s) IV Bolus once  dextrose 5%. 1000 milliLiter(s) IV Continuous <Continuous>  dextrose 5%. 1000 milliLiter(s) IV Continuous <Continuous>  dextrose 50% Injectable 25 Gram(s) IV Push once  dextrose 50% Injectable 12.5 Gram(s) IV Push once  dextrose Oral Gel 15 Gram(s) Oral once PRN  furosemide   Injectable 40 milliGRAM(s) IV Push daily  glucagon  Injectable 1 milliGRAM(s) IntraMuscular once  heparin   Injectable 4000 Unit(s) IV Push every 6 hours PRN  heparin  Infusion. 700 Unit(s)/Hr IV Continuous <Continuous>  insulin lispro (ADMELOG) corrective regimen sliding scale   SubCutaneous three times a day before meals  insulin lispro (ADMELOG) corrective regimen sliding scale   SubCutaneous at bedtime  melatonin 3 milliGRAM(s) Oral at bedtime PRN  metoprolol tartrate 25 milliGRAM(s) Oral three times a day  ondansetron Injectable 4 milliGRAM(s) IV Push every 8 hours PRN    -------------------------------------------------------------------------------------------  
Diamond Arellano MD  [facility] Division of Hospital Medicine  Pager: [phone]  Available via MS Teams    SUBJECTIVE / OVERNIGHT EVENTS:    No more chest pain per patient, no dyspnea or other acute complaints   last episode was prior to coming in, substernal, severe, lasting for an hour without improvement from nitroglycerin     MEDICATIONS  (STANDING):  aspirin enteric coated 81 milliGRAM(s) Oral daily  atorvastatin 80 milliGRAM(s) Oral at bedtime  clopidogrel Tablet 75 milliGRAM(s) Oral daily  dextrose 10% Bolus 125 milliLiter(s) IV Bolus once  dextrose 5%. 1000 milliLiter(s) (100 mL/Hr) IV Continuous <Continuous>  dextrose 5%. 1000 milliLiter(s) (50 mL/Hr) IV Continuous <Continuous>  dextrose 50% Injectable 25 Gram(s) IV Push once  dextrose 50% Injectable 12.5 Gram(s) IV Push once  furosemide   Injectable 40 milliGRAM(s) IV Push daily  glucagon  Injectable 1 milliGRAM(s) IntraMuscular once  heparin  Infusion. 700 Unit(s)/Hr (7 mL/Hr) IV Continuous <Continuous>  insulin lispro (ADMELOG) corrective regimen sliding scale   SubCutaneous three times a day before meals  insulin lispro (ADMELOG) corrective regimen sliding scale   SubCutaneous at bedtime  metoprolol tartrate 25 milliGRAM(s) Oral three times a day    MEDICATIONS  (PRN):  acetaminophen     Tablet .. 650 milliGRAM(s) Oral every 6 hours PRN Temp greater or equal to 38C (100.4F), Mild Pain (1 - 3)  aluminum hydroxide/magnesium hydroxide/simethicone Suspension 30 milliLiter(s) Oral every 4 hours PRN Dyspepsia  dextrose Oral Gel 15 Gram(s) Oral once PRN Blood Glucose LESS THAN 70 milliGRAM(s)/deciliter  heparin   Injectable 4000 Unit(s) IV Push every 6 hours PRN For aPTT less than 40  melatonin 3 milliGRAM(s) Oral at bedtime PRN Insomnia  ondansetron Injectable 4 milliGRAM(s) IV Push every 8 hours PRN Nausea and/or Vomiting      I&O's Summary    08 Jun 2024 07:01  -  09 Jun 2024 07:00  --------------------------------------------------------  IN: 100 mL / OUT: 320 mL / NET: -220 mL        PHYSICAL EXAM:  Vital Signs Last 24 Hrs  T(C): 36.8 (09 Jun 2024 05:15), Max: 36.8 (09 Jun 2024 03:20)  T(F): 98.2 (09 Jun 2024 05:15), Max: 98.3 (09 Jun 2024 03:20)  HR: 72 (09 Jun 2024 05:15) (72 - 149)  BP: 115/76 (09 Jun 2024 05:15) (104/80 - 173/103)  BP(mean): 93 (09 Jun 2024 00:12) (93 - 93)  RR: 18 (09 Jun 2024 05:15) (15 - 19)  SpO2: 100% (09 Jun 2024 05:15) (95% - 100%)    Parameters below as of 09 Jun 2024 05:15  Patient On (Oxygen Delivery Method): room air      CONSTITUTIONAL: NAD   EYES: conjunctiva and sclera clear  ENMT: Moist oral mucosa   NECK: Supple   RESPIRATORY: Normal respiratory effort; lungs are clear to auscultation bilaterally  CARDIOVASCULAR: Regular rate and rhythm, normal S1 and S2, no murmur/rub/gallop; Peripheral pulses are 2+ bilaterally  ABDOMEN: Nontender to palpation, normoactive bowel sounds, no rebound/guarding   MUSCULOSKELETAL: No lower extremity edema   PSYCH: A+O to person, place, and time; affect appropriate  NEUROLOGY: moves all extremities   SKIN: No rashes    LABS:                        13.2   7.80  )-----------( 210      ( 09 Jun 2024 04:25 )             40.5     06-09    138  |  103  |  20  ----------------------------<  116<H>  4.4   |  25  |  1.00    Ca    10.1      09 Jun 2024 04:25  Phos  5.7     06-09  Mg     2.30     06-09    TPro  6.6  /  Alb  3.4  /  TBili  0.5  /  DBili  x   /  AST  42<H>  /  ALT  25  /  AlkPhos  79  06-09    PT/INR - ( 09 Jun 2024 13:51 )   PT: 10.1 sec;   INR: 0.90 ratio         PTT - ( 09 Jun 2024 13:51 )  PTT:32.1 sec  CARDIAC MARKERS ( 09 Jun 2024 07:20 )  x     / x     / 229 U/L / x     / 27.8 ng/mL  CARDIAC MARKERS ( 09 Jun 2024 04:25 )  x     / x     / 200 U/L / x     / 25.8 ng/mL  CARDIAC MARKERS ( 08 Jun 2024 19:37 )  x     / x     / 98 U/L / x     / 6.3 ng/mL  CARDIAC MARKERS ( 08 Jun 2024 18:35 )  x     / x     / 93 U/L / x     / 4.3 ng/mL      Urinalysis Basic - ( 09 Jun 2024 04:25 )    Color: x / Appearance: x / SG: x / pH: x  Gluc: 116 mg/dL / Ketone: x  / Bili: x / Urobili: x   Blood: x / Protein: x / Nitrite: x   Leuk Esterase: x / RBC: x / WBC x   Sq Epi: x / Non Sq Epi: x / Bacteria: x            RADIOLOGY & ADDITIONAL TESTS:  Other Results Reviewed Today: BMP with stable Cr, CBC with stable Hg     COORDINATION OF CARE:  Communication: discussed plan of care with ACP 
Cardiology Progress Note  ------------------------------------------------------------------------------------------  SUBJECTIVE:   - no current chest pain, SOB, palpitations  - episode of left sided chest/shoulder pain overnight that resolved spontaneously. ecg and CKMB unchanged  - no new complaints this AM  -------------------------------------------------------------------------------------------  VS:  T(F): 98.4 (06-10), Max: 98.4 (06-10)  HR: 81 (06-10) (71 - 81)  BP: 153/94 (06-10) (135/78 - 157/97)  RR: 17 (06-10)  SpO2: 100% (06-10)  I&O's Summary    09 Jun 2024 07:01  -  10 [place] 2024 07:00  --------------------------------------------------------  IN: 0 mL / OUT: 300 mL / NET: -300 mL      PHYSICAL EXAM:  GENERAL: NAD  HEAD:  Atraumatic, Normocephalic.  EYES: EOMI, PERRLA, conjunctiva and sclera clear.  ENT: Moist mucous membranes.  NECK: Supple, No JVD.  CHEST/LUNG: Clear to auscultation bilaterally; No rales, rhonchi, wheezing, or rubs. Unlabored respirations.  HEART: Regular rate and rhythm; No murmurs, rubs, or gallops.  ABDOMEN: Bowel sounds present; Soft, Nontender, Nondistended.   EXTREMITIES: No edema. 2+ Peripheral Pulses, brisk capillary refill. No clubbing or cyanosis.   PSYCH: Normal affect.  SKIN: No rashes or lesions.  -------------------------------------------------------------------------------------------  LABS:                          13.8   6.99  )-----------( 229      ( 10 [place] 2024 04:50 )             41.0     06-10    139  |  102  |  20  ----------------------------<  124<H>  4.8   |  20<L>  |  0.98    Ca    9.4      10 [place] 2024 03:23  Phos  5.2     06-10  Mg     2.00     06-10    TPro  6.6  /  Alb  3.4  /  TBili  0.5  /  DBili  x   /  AST  42<H>  /  ALT  25  /  AlkPhos  79  06-09    PT/INR - ( 09 Jun 2024 20:05 )   PT: 10.1 sec;   INR: 0.90 ratio         PTT - ( 10 [place] 2024 03:23 )  PTT:44.8 sec  CARDIAC MARKERS ( 10 [place] 2024 03:23 )  413 ng/L / x     / x     / 155 U/L / x     / 9.6 ng/mL  CARDIAC MARKERS ( 09 Jun 2024 20:05 )  476 ng/L / x     / x     / x     / x     / x      CARDIAC MARKERS ( 09 Jun 2024 13:51 )  657 ng/L / x     / x     / 212 U/L / x     / 24.4 ng/mL  CARDIAC MARKERS ( 09 Jun 2024 07:20 )  461 ng/L / x     / x     / 229 U/L / x     / 27.8 ng/mL  CARDIAC MARKERS ( 09 Jun 2024 04:25 )  386 ng/L / x     / x     / 200 U/L / x     / 25.8 ng/mL            -------------------------------------------------------------------------------------------  Meds:  acetaminophen     Tablet .. 650 milliGRAM(s) Oral every 6 hours PRN  aluminum hydroxide/magnesium hydroxide/simethicone Suspension 30 milliLiter(s) Oral every 4 hours PRN  aspirin enteric coated 81 milliGRAM(s) Oral daily  atorvastatin 80 milliGRAM(s) Oral at bedtime  clopidogrel Tablet 75 milliGRAM(s) Oral daily  dextrose 10% Bolus 125 milliLiter(s) IV Bolus once  dextrose 5%. 1000 milliLiter(s) IV Continuous <Continuous>  dextrose 5%. 1000 milliLiter(s) IV Continuous <Continuous>  dextrose 50% Injectable 25 Gram(s) IV Push once  dextrose 50% Injectable 12.5 Gram(s) IV Push once  dextrose Oral Gel 15 Gram(s) Oral once PRN  furosemide   Injectable 40 milliGRAM(s) IV Push daily  glucagon  Injectable 1 milliGRAM(s) IntraMuscular once  heparin   Injectable 4000 Unit(s) IV Push every 6 hours PRN  heparin  Infusion. 700 Unit(s)/Hr IV Continuous <Continuous>  insulin lispro (ADMELOG) corrective regimen sliding scale   SubCutaneous three times a day before meals  insulin lispro (ADMELOG) corrective regimen sliding scale   SubCutaneous at bedtime  melatonin 3 milliGRAM(s) Oral at bedtime PRN  metoprolol tartrate 25 milliGRAM(s) Oral three times a day  ondansetron Injectable 4 milliGRAM(s) IV Push every 8 hours PRN    -------------------------------------------------------------------------------------------  
Patient is seen and examined. Patient denies any chest pain, SOB, palpitations or dizziness at this time  Telemetry shows sinus rhythm with HR 70s and PVCs    PAST MEDICAL & SURGICAL HISTORY:  Coronary artery disease    Hypertension    Diabetes mellitus    S/P coronary artery bypass graft x 3        MEDICATIONS  (STANDING):  aspirin enteric coated 81 milliGRAM(s) Oral daily  atorvastatin 80 milliGRAM(s) Oral at bedtime  clopidogrel Tablet 75 milliGRAM(s) Oral daily  dextrose 10% Bolus 125 milliLiter(s) IV Bolus once  dextrose 5%. 1000 milliLiter(s) (50 mL/Hr) IV Continuous <Continuous>  dextrose 5%. 1000 milliLiter(s) (100 mL/Hr) IV Continuous <Continuous>  dextrose 50% Injectable 25 Gram(s) IV Push once  dextrose 50% Injectable 12.5 Gram(s) IV Push once  FLUoxetine 10 milliGRAM(s) Oral daily  furosemide   Injectable 40 milliGRAM(s) IV Push daily  glucagon  Injectable 1 milliGRAM(s) IntraMuscular once  insulin lispro (ADMELOG) corrective regimen sliding scale   SubCutaneous at bedtime  insulin lispro (ADMELOG) corrective regimen sliding scale   SubCutaneous three times a day before meals  isosorbide   mononitrate ER Tablet (IMDUR) 30 milliGRAM(s) Oral daily  levothyroxine 25 MICROGram(s) Oral daily  metoprolol succinate  milliGRAM(s) Oral daily  pantoprazole    Tablet 40 milliGRAM(s) Oral before breakfast  sacubitril 24 mG/valsartan 26 mG 1 Tablet(s) Oral two times a day  spironolactone 25 milliGRAM(s) Oral daily    MEDICATIONS  (PRN):  acetaminophen     Tablet .. 650 milliGRAM(s) Oral every 6 hours PRN Temp greater or equal to 38C (100.4F), Mild Pain (1 - 3)  aluminum hydroxide/magnesium hydroxide/simethicone Suspension 30 milliLiter(s) Oral every 4 hours PRN Dyspepsia  dextrose Oral Gel 15 Gram(s) Oral once PRN Blood Glucose LESS THAN 70 milliGRAM(s)/deciliter  melatonin 3 milliGRAM(s) Oral at bedtime PRN Insomnia  ondansetron Injectable 4 milliGRAM(s) IV Push every 8 hours PRN Nausea and/or Vomiting    Vital Signs Last 24 Hrs  T(C): 36.4 (12 Jun 2024 05:24), Max: 36.4 (11 Jun 2024 12:00)  T(F): 97.5 (12 Jun 2024 05:24), Max: 97.6 (11 Jun 2024 12:00)  HR: 83 (12 Jun 2024 05:24) (69 - 83)  BP: 151/93 (12 Jun 2024 05:24) (128/80 - 151/93)  BP(mean): --  RR: 18 (12 Jun 2024 05:24) (17 - 18)  SpO2: 97% (12 Jun 2024 05:24) (96% - 98%)    Parameters below as of 12 Jun 2024 05:24  Patient On (Oxygen Delivery Method): room air    LABS:                        13.2   6.69  )-----------( 212      ( 12 Jun 2024 05:37 )             41.0     06-12    141  |  107  |  17  ----------------------------<  100<H>  4.1   |  23  |  0.88    Ca    9.0      12 Jun 2024 05:37  Phos  4.2     06-12  Mg     2.00     06-12      CARDIAC MARKERS ( 11 Jun 2024 03:38 )  x     / x     / 79 U/L / x     / 2.8 ng/mL      PTT - ( 10 [place] 2024 11:54 )  PTT:87.5 sec  Urinalysis Basic - ( 12 Jun 2024 05:37 )    Color: x / Appearance: x / SG: x / pH: x  Gluc: 100 mg/dL / Ketone: x  / Bili: x / Urobili: x   Blood: x / Protein: x / Nitrite: x   Leuk Esterase: x / RBC: x / WBC x   Sq Epi: x / Non Sq Epi: x / Bacteria: x      I&O's Summary    11 Jun 2024 07:01  -  12 Jun 2024 07:00  --------------------------------------------------------  IN: 840 mL / OUT: 0 mL / NET: 840 mL      PHYSICAL EXAM:    GENERAL: In no apparent distress, well nourished, and hydrated.  HEART: Regular rate and rhythm; No murmurs, rubs, or gallops.  PULMONARY: Clear to auscultation and percussion.  No rales, wheezing, or rhonchi bilaterally.  ABDOMEN: Soft, Nontender, Nondistended; Bowel sounds present  EXTREMITIES:  2+ Peripheral Pulses, No clubbing, cyanosis, or edema          
The patient was seen and examined with the Cardiology Consultation Teaching Service.     No overnight events    No chest pain  No dyspnea, orthopnea or PND  No palpitations or dizziness     PMH/PSH:  Coronary artery disease  3-vessel coronary artery bypass (LIMA-LAD, SVG-OM1, occluded SVG-RCA)  Multiple percutaneous coronary interventions with drug-eluting stents, ramus 2016  Chronic heart failure with reduced LV function  VT with ICD implantation  Diabetes  Hypertension  Dyslipidemia    Comfortable-appearing woman in no acute distress  Alert and oriented  Afebrile  Vital signs stable  Hypertensive  I/O net positive 0.4L  JVP is not elevated  Clear lungs  Normal heart sounds  Extremities are warm and perfused  No peripheral edema     Normal blood counts  GFR 69    Hs-troponin 448, 413, peak 657  CK-MB 2.8, index 3.5  Pro-BNP 7713    HbA1c 7.2    Echocardiography demonstrates reduced LV systolic function, LVEF 37% with hypokinesis of the inferior segments. Moderate-severe MR, moderate TR Estimated PA systolic pressure is 55 mmHg.
[facility] Division of Hospital Medicine  Jose Joshua   Available via MS Teams  In house pager [phone]    SUBJECTIVE / OVERNIGHT EVENTS:  Overnight episode of chest pain.   Now without symptoms.  Daughter Yashira at bedside offering translation.    ADDITIONAL REVIEW OF SYSTEMS:    MEDICATIONS  (STANDING):  aspirin enteric coated 81 milliGRAM(s) Oral daily  atorvastatin 80 milliGRAM(s) Oral at bedtime  clopidogrel Tablet 75 milliGRAM(s) Oral daily  dextrose 10% Bolus 125 milliLiter(s) IV Bolus once  dextrose 5%. 1000 milliLiter(s) (100 mL/Hr) IV Continuous <Continuous>  dextrose 5%. 1000 milliLiter(s) (50 mL/Hr) IV Continuous <Continuous>  dextrose 50% Injectable 25 Gram(s) IV Push once  dextrose 50% Injectable 12.5 Gram(s) IV Push once  furosemide   Injectable 40 milliGRAM(s) IV Push daily  glucagon  Injectable 1 milliGRAM(s) IntraMuscular once  insulin lispro (ADMELOG) corrective regimen sliding scale   SubCutaneous three times a day before meals  insulin lispro (ADMELOG) corrective regimen sliding scale   SubCutaneous at bedtime  metoprolol tartrate 50 milliGRAM(s) Oral two times a day  sacubitril 24 mG/valsartan 26 mG 1 Tablet(s) Oral two times a day    MEDICATIONS  (PRN):  acetaminophen     Tablet .. 650 milliGRAM(s) Oral every 6 hours PRN Temp greater or equal to 38C (100.4F), Mild Pain (1 - 3)  aluminum hydroxide/magnesium hydroxide/simethicone Suspension 30 milliLiter(s) Oral every 4 hours PRN Dyspepsia  dextrose Oral Gel 15 Gram(s) Oral once PRN Blood Glucose LESS THAN 70 milliGRAM(s)/deciliter  melatonin 3 milliGRAM(s) Oral at bedtime PRN Insomnia  ondansetron Injectable 4 milliGRAM(s) IV Push every 8 hours PRN Nausea and/or Vomiting      I&O's Summary    10 [place] 2024 07:01  -  11 Jun 2024 07:00  --------------------------------------------------------  IN: 390 mL / OUT: 350 mL / NET: 40 mL    11 Jun 2024 07:01  -  11 Jun 2024 14:44  --------------------------------------------------------  IN: 480 mL / OUT: 0 mL / NET: 480 mL        PHYSICAL EXAM:  Vital Signs Last 24 Hrs  T(C): 36.4 (11 Jun 2024 12:00), Max: 36.7 (10 [place] 2024 21:15)  T(F): 97.6 (11 Jun 2024 12:00), Max: 98.1 (10 [place] 2024 21:15)  HR: 76 (11 Jun 2024 12:00) (67 - 77)  BP: 145/75 (11 Jun 2024 12:00) (122/65 - 145/86)  BP(mean): --  RR: 18 (11 Jun 2024 12:00) (17 - 18)  SpO2: 98% (11 Jun 2024 12:00) (97% - 100%)    Parameters below as of 11 Jun 2024 12:00  Patient On (Oxygen Delivery Method): room air      CONSTITUTIONAL: NAD,  well-groomed  EYES: PERRLA; conjunctiva and sclera clear  ENMT: Moist oral mucosa, no pharyngeal injection or exudates;  NECK: Supple, no palpable masses; no thyromegaly  RESPIRATORY: Normal respiratory effort; lungs are clear to auscultation bilaterally  CARDIOVASCULAR: Regular rate and rhythm, normal S1 and S2, no murmur/rub/gallop; No lower extremity edema; Peripheral pulses are 2+ bilaterally  ABDOMEN: Nontender to palpation, normoactive bowel sounds, no rebound/guarding; No hepatosplenomegaly  MUSCULOSKELETAL:  no clubbing or cyanosis of digits; no joint swelling or tenderness to palpation  PSYCH: A+O to person, place, and time; affect appropriate  NEUROLOGY: CN 2-12 are intact and symmetric; no gross sensory deficits   SKIN: No rashes; no palpable lesions; left upper chest wall ICD    LABS:                        12.8   7.22  )-----------( 207      ( 11 Jun 2024 03:38 )             40.1     06-11    141  |  107  |  21  ----------------------------<  113<H>  4.3   |  19<L>  |  0.92    Ca    8.7      11 Jun 2024 03:38  Phos  5.0     06-11  Mg     1.80     06-11      PT/INR - ( 09 Jun 2024 20:05 )   PT: 10.1 sec;   INR: 0.90 ratio         PTT - ( 10 [place] 2024 11:54 )  PTT:87.5 sec  CARDIAC MARKERS ( 11 Jun 2024 03:38 )  x     / x     / 79 U/L / x     / 2.8 ng/mL  CARDIAC MARKERS ( 10 [place] 2024 03:23 )  x     / x     / 155 U/L / x     / 9.6 ng/mL      Urinalysis Basic - ( 11 Jun 2024 03:38 )    Color: x / Appearance: x / SG: x / pH: x  Gluc: 113 mg/dL / Ketone: x  / Bili: x / Urobili: x   Blood: x / Protein: x / Nitrite: x   Leuk Esterase: x / RBC: x / WBC x   Sq Epi: x / Non Sq Epi: x / Bacteria: x    
[facility] Division of Hospital Medicine  Jose JoshuaDO  Available via MS Teams  In house pager [phone]    SUBJECTIVE / OVERNIGHT EVENTS:  No acute events overnight. Patient seen and examined at bedside this morning.  States she feels well at present.  Daughter amenable to outpatient follow-up for ICD revision as long as an appointment is made.  Will offer pain control options in meantime for musculoskeletal pain on discharge.    ADDITIONAL REVIEW OF SYSTEMS:    MEDICATIONS  (STANDING):  aspirin enteric coated 81 milliGRAM(s) Oral daily  atorvastatin 80 milliGRAM(s) Oral at bedtime  clopidogrel Tablet 75 milliGRAM(s) Oral daily  dextrose 10% Bolus 125 milliLiter(s) IV Bolus once  dextrose 5%. 1000 milliLiter(s) (100 mL/Hr) IV Continuous <Continuous>  dextrose 5%. 1000 milliLiter(s) (50 mL/Hr) IV Continuous <Continuous>  dextrose 50% Injectable 25 Gram(s) IV Push once  dextrose 50% Injectable 12.5 Gram(s) IV Push once  FLUoxetine 10 milliGRAM(s) Oral daily  furosemide   Injectable 40 milliGRAM(s) IV Push daily  glucagon  Injectable 1 milliGRAM(s) IntraMuscular once  insulin lispro (ADMELOG) corrective regimen sliding scale   SubCutaneous at bedtime  insulin lispro (ADMELOG) corrective regimen sliding scale   SubCutaneous three times a day before meals  isosorbide   mononitrate ER Tablet (IMDUR) 30 milliGRAM(s) Oral daily  levothyroxine 25 MICROGram(s) Oral daily  metoprolol succinate  milliGRAM(s) Oral daily  pantoprazole    Tablet 40 milliGRAM(s) Oral before breakfast  sacubitril 24 mG/valsartan 26 mG 1 Tablet(s) Oral two times a day  spironolactone 25 milliGRAM(s) Oral daily    MEDICATIONS  (PRN):  acetaminophen     Tablet .. 650 milliGRAM(s) Oral every 6 hours PRN Temp greater or equal to 38C (100.4F), Mild Pain (1 - 3)  aluminum hydroxide/magnesium hydroxide/simethicone Suspension 30 milliLiter(s) Oral every 4 hours PRN Dyspepsia  dextrose Oral Gel 15 Gram(s) Oral once PRN Blood Glucose LESS THAN 70 milliGRAM(s)/deciliter  melatonin 3 milliGRAM(s) Oral at bedtime PRN Insomnia  ondansetron Injectable 4 milliGRAM(s) IV Push every 8 hours PRN Nausea and/or Vomiting      I&O's Summary    11 Jun 2024 07:01  -  12 Jun 2024 07:00  --------------------------------------------------------  IN: 840 mL / OUT: 0 mL / NET: 840 mL    12 Jun 2024 07:01  -  12 Jun 2024 14:46  --------------------------------------------------------  IN: 240 mL / OUT: 0 mL / NET: 240 mL        PHYSICAL EXAM:  Vital Signs Last 24 Hrs  T(C): 36.4 (12 Jun 2024 11:15), Max: 36.4 (11 Jun 2024 17:00)  T(F): 97.6 (12 Jun 2024 11:15), Max: 97.6 (11 Jun 2024 17:00)  HR: 74 (12 Jun 2024 11:15) (69 - 83)  BP: 128/70 (12 Jun 2024 11:15) (128/70 - 151/93)  BP(mean): --  RR: 18 (12 Jun 2024 11:15) (17 - 18)  SpO2: 100% (12 Jun 2024 11:15) (96% - 100%)    Parameters below as of 12 Jun 2024 11:15  Patient On (Oxygen Delivery Method): room air      CONSTITUTIONAL: NAD, well-groomed  EYES: PERRLA; conjunctiva and sclera clear  ENMT: Moist oral mucosa, no pharyngeal injection or exudates; normal dentition  NECK: Supple, no palpable masses; no thyromegaly  RESPIRATORY: Normal respiratory effort; lungs are clear to auscultation bilaterally  CARDIOVASCULAR: Regular rate and rhythm, normal S1 and S2, no murmur/rub/gallop; No lower extremity edema; Peripheral pulses are 2+ bilaterally  ABDOMEN: Nontender to palpation, normoactive bowel sounds, no rebound/guarding; No hepatosplenomegaly  MUSCULOSKELETAL: no clubbing or cyanosis of digits; no joint swelling or tenderness to palpation  PSYCH: A+O to person, place, and time; affect appropriate  NEUROLOGY: CN 2-12 are intact and symmetric; no gross sensory deficits   SKIN: No rashes; no palpable lesions  Left upper chest wall ICD    LABS:                        13.2   6.69  )-----------( 212      ( 12 Jun 2024 05:37 )             41.0     06-12    141  |  107  |  17  ----------------------------<  100<H>  4.1   |  23  |  0.88    Ca    9.0      12 Jun 2024 05:37  Phos  4.2     06-12  Mg     2.00     06-12        CARDIAC MARKERS ( 11 Jun 2024 03:38 )  x     / x     / 79 U/L / x     / 2.8 ng/mL      Urinalysis Basic - ( 12 Jun 2024 05:37 )    Color: x / Appearance: x / SG: x / pH: x  Gluc: 100 mg/dL / Ketone: x  / Bili: x / Urobili: x   Blood: x / Protein: x / Nitrite: x   Leuk Esterase: x / RBC: x / WBC x   Sq Epi: x / Non Sq Epi: x / Bacteria: x        
[facility] Division of Hospital Medicine  Jose Joshua DO  Available via MS Teams  In house pager [phone]    SUBJECTIVE / OVERNIGHT EVENTS:  No acute events overnight. Patient seen and examined at bedside this morning.  Daughter at bedside offers to translate. She reports the patient was recently seen at [facility] in mid April and underwent angiography, not requiring stent placement at the time.  Patient states she feels well overall at the moment.    [facility] not performed this morning, cardiology asking for records.  Family wish for resolution of ICD lead revision and generator change will inpatient, although EP team state not an acute issue and can be done outpatient.    ADDITIONAL REVIEW OF SYSTEMS:    MEDICATIONS  (STANDING):  aspirin enteric coated 81 milliGRAM(s) Oral daily  atorvastatin 80 milliGRAM(s) Oral at bedtime  clopidogrel Tablet 75 milliGRAM(s) Oral daily  dextrose 10% Bolus 125 milliLiter(s) IV Bolus once  dextrose 5%. 1000 milliLiter(s) (100 mL/Hr) IV Continuous <Continuous>  dextrose 5%. 1000 milliLiter(s) (50 mL/Hr) IV Continuous <Continuous>  dextrose 50% Injectable 25 Gram(s) IV Push once  dextrose 50% Injectable 12.5 Gram(s) IV Push once  furosemide   Injectable 40 milliGRAM(s) IV Push daily  glucagon  Injectable 1 milliGRAM(s) IntraMuscular once  heparin  Infusion. 700 Unit(s)/Hr (7 mL/Hr) IV Continuous <Continuous>  insulin lispro (ADMELOG) corrective regimen sliding scale   SubCutaneous three times a day before meals  insulin lispro (ADMELOG) corrective regimen sliding scale   SubCutaneous at bedtime  metoprolol tartrate 25 milliGRAM(s) Oral three times a day    MEDICATIONS  (PRN):  acetaminophen     Tablet .. 650 milliGRAM(s) Oral every 6 hours PRN Temp greater or equal to 38C (100.4F), Mild Pain (1 - 3)  aluminum hydroxide/magnesium hydroxide/simethicone Suspension 30 milliLiter(s) Oral every 4 hours PRN Dyspepsia  dextrose Oral Gel 15 Gram(s) Oral once PRN Blood Glucose LESS THAN 70 milliGRAM(s)/deciliter  heparin   Injectable 4000 Unit(s) IV Push every 6 hours PRN For aPTT less than 40  melatonin 3 milliGRAM(s) Oral at bedtime PRN Insomnia  ondansetron Injectable 4 milliGRAM(s) IV Push every 8 hours PRN Nausea and/or Vomiting      I&O's Summary    09 Jun 2024 07:01  -  10 [place] 2024 07:00  --------------------------------------------------------  IN: 0 mL / OUT: 300 mL / NET: -300 mL        PHYSICAL EXAM:  Vital Signs Last 24 Hrs  T(C): 36.9 (10 [place] 2024 05:12), Max: 36.9 (10 [place] 2024 05:12)  T(F): 98.4 (10 [place] 2024 05:12), Max: 98.4 (10 [place] 2024 05:12)  HR: 81 (10 [place] 2024 05:12) (71 - 81)  BP: 153/94 (10 [place] 2024 05:12) (135/78 - 157/97)  BP(mean): --  RR: 17 (10 [place] 2024 05:12) (17 - 18)  SpO2: 100% (10 [place] 2024 05:12) (96% - 100%)    Parameters below as of 10 [place] 2024 05:12  Patient On (Oxygen Delivery Method): room air      CONSTITUTIONAL: NAD, well-groomed  EYES: PERRLA; conjunctiva and sclera clear  ENMT: Moist oral mucosa, no pharyngeal injection or exudates  NECK: Supple, no palpable masses; no thyromegaly  RESPIRATORY: Normal respiratory effort; lungs are clear to auscultation bilaterally  CARDIOVASCULAR: Regular rate and rhythm, normal S1 and S2, no murmur/rub/gallop; No lower extremity edema; Peripheral pulses are 2+ bilaterally  ABDOMEN: Nontender to palpation, normoactive bowel sounds, no rebound/guarding; No hepatosplenomegaly  MUSCULOSKELETAL: no clubbing or cyanosis of digits; no joint swelling or tenderness to palpation  PSYCH: A+O to person, place, and time; affect appropriate  NEUROLOGY: CN 2-12 are intact and symmetric; no gross sensory deficits   SKIN: No rashes; no palpable lesions    LABS:                        13.8   6.99  )-----------( 229      ( 10 [place] 2024 04:50 )             41.0     06-10    139  |  102  |  20  ----------------------------<  124<H>  4.8   |  20<L>  |  0.98    Ca    9.4      10 [place] 2024 03:23  Phos  5.2     06-10  Mg     2.00     06-10    TPro  6.6  /  Alb  3.4  /  TBili  0.5  /  DBili  x   /  AST  42<H>  /  ALT  25  /  AlkPhos  79  06-09    PT/INR - ( 09 Jun 2024 20:05 )   PT: 10.1 sec;   INR: 0.90 ratio         PTT - ( 10 [place] 2024 11:54 )  PTT:87.5 sec  CARDIAC MARKERS ( 10 [place] 2024 03:23 )  x     / x     / 155 U/L / x     / 9.6 ng/mL  CARDIAC MARKERS ( 09 Jun 2024 13:51 )  x     / x     / 212 U/L / x     / 24.4 ng/mL  CARDIAC MARKERS ( 09 Jun 2024 07:20 )  x     / x     / 229 U/L / x     / 27.8 ng/mL  CARDIAC MARKERS ( 09 Jun 2024 04:25 )  x     / x     / 200 U/L / x     / 25.8 ng/mL  CARDIAC MARKERS ( 08 Jun 2024 19:37 )  x     / x     / 98 U/L / x     / 6.3 ng/mL  CARDIAC MARKERS ( 08 Jun 2024 18:35 )  x     / x     / 93 U/L / x     / 4.3 ng/mL      Urinalysis Basic - ( 10 [place] 2024 03:23 )    Color: x / Appearance: x / SG: x / pH: x  Gluc: 124 mg/dL / Ketone: x  / Bili: x / Urobili: x   Blood: x / Protein: x / Nitrite: x   Leuk Esterase: x / RBC: x / WBC x   Sq Epi: x / Non Sq Epi: x / Bacteria: x

## 2024-06-12 NOTE — DISCHARGE NOTE PROVIDER - NSDCFUADDAPPT_GEN_ALL_CORE_FT
Dr. Elliott's office will call you with an appointment time to schedule your procedure for 6/17/24.  Follow up with your PCP within one week.

## 2024-06-14 PROBLEM — Z00.00 ENCOUNTER FOR PREVENTIVE HEALTH EXAMINATION: Status: ACTIVE | Noted: 2024-06-14

## 2024-06-17 ENCOUNTER — TRANSCRIPTION ENCOUNTER (OUTPATIENT)
Age: 74
End: 2024-06-17

## 2024-06-17 ENCOUNTER — OUTPATIENT (OUTPATIENT)
Dept: OUTPATIENT SERVICES | Facility: HOSPITAL | Age: 74
LOS: 1 days | Discharge: HOME | End: 2024-06-17
Payer: MEDICARE

## 2024-06-17 ENCOUNTER — APPOINTMENT (OUTPATIENT)
Dept: ELECTROPHYSIOLOGY | Facility: CLINIC | Age: 74
End: 2024-06-17
Payer: MEDICARE

## 2024-06-17 ENCOUNTER — NON-APPOINTMENT (OUTPATIENT)
Age: 74
End: 2024-06-17

## 2024-06-17 VITALS
HEART RATE: 73 BPM | OXYGEN SATURATION: 99 % | DIASTOLIC BLOOD PRESSURE: 80 MMHG | HEIGHT: 63 IN | SYSTOLIC BLOOD PRESSURE: 139 MMHG | RESPIRATION RATE: 22 BRPM | TEMPERATURE: 98 F | WEIGHT: 212.97 LBS

## 2024-06-17 VITALS
RESPIRATION RATE: 18 BRPM | SYSTOLIC BLOOD PRESSURE: 145 MMHG | DIASTOLIC BLOOD PRESSURE: 62 MMHG | OXYGEN SATURATION: 96 % | HEART RATE: 64 BPM

## 2024-06-17 DIAGNOSIS — Z95.5 PRESENCE OF CORONARY ANGIOPLASTY IMPLANT AND GRAFT: Chronic | ICD-10-CM

## 2024-06-17 DIAGNOSIS — Z95.1 PRESENCE OF AORTOCORONARY BYPASS GRAFT: Chronic | ICD-10-CM

## 2024-06-17 DIAGNOSIS — Z95.810 PRESENCE OF AUTOMATIC (IMPLANTABLE) CARDIAC DEFIBRILLATOR: Chronic | ICD-10-CM

## 2024-06-17 DIAGNOSIS — T82.9XXA UNSPECIFIED COMPLICATION OF CARDIAC AND VASCULAR PROSTHETIC DEVICE, IMPLANT AND GRAFT, INITIAL ENCOUNTER: ICD-10-CM

## 2024-06-17 LAB — GLUCOSE BLDC GLUCOMTR-MCNC: 158 MG/DL — HIGH (ref 70–99)

## 2024-06-17 PROCEDURE — 93010 ELECTROCARDIOGRAM REPORT: CPT | Mod: 76

## 2024-06-17 PROCEDURE — 33262 RMVL& REPLC PULSE GEN 1 LEAD: CPT

## 2024-06-17 PROCEDURE — 93295 DEV INTERROG REMOTE 1/2/MLT: CPT

## 2024-06-17 PROCEDURE — 33223 RELOCATE POCKET FOR DEFIB: CPT

## 2024-06-17 PROCEDURE — 93296 REM INTERROG EVL PM/IDS: CPT

## 2024-06-17 RX ORDER — ASPIRIN 325 MG/1
81 TABLET, FILM COATED ORAL DAILY
Refills: 0 | Status: DISCONTINUED | OUTPATIENT
Start: 2024-06-17 | End: 2024-07-01

## 2024-06-17 RX ORDER — CEFADROXIL 500 MG
1 CAPSULE ORAL
Qty: 4 | Refills: 0
Start: 2024-06-17 | End: 2024-06-18

## 2024-06-17 RX ORDER — SODIUM CHLORIDE 0.9 % (FLUSH) 0.9 %
3 SYRINGE (ML) INJECTION EVERY 8 HOURS
Refills: 0 | Status: DISCONTINUED | OUTPATIENT
Start: 2024-06-17 | End: 2024-07-01

## 2024-06-17 RX ADMIN — ASPIRIN 81 MILLIGRAM(S): 325 TABLET, FILM COATED ORAL at 07:41

## 2024-06-18 DIAGNOSIS — R52 PAIN, UNSPECIFIED: ICD-10-CM

## 2024-06-18 RX ORDER — OXYCODONE HYDROCHLORIDE 5 MG/1
5 CAPSULE ORAL EVERY 8 HOURS
Qty: 6 | Refills: 0 | Status: ACTIVE | COMMUNITY
Start: 2024-06-18 | End: 1900-01-01

## 2024-06-20 RX ORDER — ATORVASTATIN CALCIUM 80 MG/1
1 TABLET, FILM COATED ORAL
Refills: 0 | DISCHARGE

## 2024-06-20 RX ORDER — PANTOPRAZOLE SODIUM 20 MG/1
1 TABLET, DELAYED RELEASE ORAL
Refills: 0 | DISCHARGE

## 2024-06-20 RX ORDER — LEVOTHYROXINE SODIUM 125 MCG
1 TABLET ORAL
Refills: 0 | DISCHARGE

## 2024-06-20 RX ORDER — FLUOXETINE HCL 10 MG
1 CAPSULE ORAL
Refills: 0 | DISCHARGE

## 2024-06-20 RX ORDER — AMLODIPINE BESYLATE 2.5 MG/1
1 TABLET ORAL
Refills: 0 | DISCHARGE

## 2024-06-20 RX ORDER — ASPIRIN/CALCIUM CARB/MAGNESIUM 324 MG
1 TABLET ORAL
Refills: 0 | DISCHARGE

## 2024-06-20 RX ORDER — METFORMIN HYDROCHLORIDE 850 MG/1
1 TABLET ORAL
Refills: 0 | DISCHARGE

## 2024-06-20 RX ORDER — TIRZEPATIDE 15 MG/.5ML
7.5 INJECTION, SOLUTION SUBCUTANEOUS
Refills: 0 | DISCHARGE

## 2024-06-20 RX ORDER — NITROGLYCERIN 6.5 MG
1 CAPSULE, EXTENDED RELEASE ORAL
Refills: 0 | DISCHARGE

## 2024-06-20 RX ORDER — ISOSORBIDE DINITRATE 5 MG/1
1 TABLET ORAL
Refills: 0 | DISCHARGE

## 2024-06-20 RX ORDER — SACUBITRIL AND VALSARTAN 24; 26 MG/1; MG/1
1 TABLET, FILM COATED ORAL
Refills: 0 | DISCHARGE

## 2024-06-20 RX ORDER — METOPROLOL TARTRATE 50 MG
1 TABLET ORAL
Refills: 0 | DISCHARGE

## 2024-06-20 RX ORDER — CLOPIDOGREL BISULFATE 75 MG/1
1 TABLET, FILM COATED ORAL
Refills: 0 | DISCHARGE

## 2024-06-20 RX ORDER — LIFITEGRAST 50 MG/ML
1 SOLUTION/ DROPS OPHTHALMIC
Refills: 0 | DISCHARGE

## 2024-06-20 RX ORDER — CARVEDILOL PHOSPHATE 80 MG/1
1 CAPSULE, EXTENDED RELEASE ORAL
Refills: 0 | DISCHARGE

## 2024-06-20 RX ORDER — DAPAGLIFLOZIN 10 MG/1
1 TABLET, FILM COATED ORAL
Refills: 0 | DISCHARGE

## 2024-06-21 PROBLEM — I10 ESSENTIAL (PRIMARY) HYPERTENSION: Chronic | Status: ACTIVE | Noted: 2024-06-08

## 2024-06-21 NOTE — PATIENT PROFILE ADULT - BRADEN MOISTURE
Pt A & O x4, On RA.  Regular diet.  Lovenox.  Last Bm 6/20.  Up ad zoya.  Pt sees wound clinic, they last wrapped LLE with compression wrap and dressing was changed on 6/20.  Pt goes there weekly for dressing changes.  RLE red and weaping,  wound care consulted.  Dr. Short seeing pt.  On Vanco Q12, cultures pending.   (4) rarely moist

## 2024-07-02 PROBLEM — E11.9 TYPE 2 DIABETES MELLITUS WITHOUT COMPLICATIONS: Chronic | Status: ACTIVE | Noted: 2024-06-08

## 2024-07-02 PROBLEM — I25.10 ATHEROSCLEROTIC HEART DISEASE OF NATIVE CORONARY ARTERY WITHOUT ANGINA PECTORIS: Chronic | Status: ACTIVE | Noted: 2024-06-08

## 2024-07-03 ENCOUNTER — NON-APPOINTMENT (OUTPATIENT)
Age: 74
End: 2024-07-03

## 2024-07-03 ENCOUNTER — APPOINTMENT (OUTPATIENT)
Dept: ELECTROPHYSIOLOGY | Facility: CLINIC | Age: 74
End: 2024-07-03
Payer: MEDICARE

## 2024-07-03 DIAGNOSIS — I47.29 OTHER VENTRICULAR TACHYCARDIA: ICD-10-CM

## 2024-07-03 PROCEDURE — 99024 POSTOP FOLLOW-UP VISIT: CPT

## 2024-07-16 RX ORDER — TIRZEPATIDE 7.5 MG/.5ML
7.5 INJECTION, SOLUTION SUBCUTANEOUS
Refills: 0 | Status: ACTIVE | COMMUNITY

## 2024-07-16 RX ORDER — SACUBITRIL AND VALSARTAN 24; 26 MG/1; MG/1
24-26 TABLET, FILM COATED ORAL TWICE DAILY
Refills: 0 | Status: ACTIVE | COMMUNITY

## 2024-07-16 RX ORDER — METFORMIN HYDROCHLORIDE 500 MG/1
500 TABLET, COATED ORAL TWICE DAILY
Refills: 0 | Status: ACTIVE | COMMUNITY

## 2024-07-16 RX ORDER — PANTOPRAZOLE 40 MG/1
40 TABLET, DELAYED RELEASE ORAL
Refills: 0 | Status: ACTIVE | COMMUNITY

## 2024-07-16 RX ORDER — DAPAGLIFLOZIN 5 MG/1
5 TABLET, FILM COATED ORAL
Refills: 0 | Status: ACTIVE | COMMUNITY

## 2024-07-16 RX ORDER — SPIRONOLACTONE 25 MG/1
25 TABLET ORAL
Refills: 0 | Status: ACTIVE | COMMUNITY

## 2024-07-16 RX ORDER — ISOSORBIDE MONONITRATE 30 MG/1
30 TABLET, EXTENDED RELEASE ORAL
Refills: 0 | Status: ACTIVE | COMMUNITY

## 2024-09-16 ENCOUNTER — INPATIENT (INPATIENT)
Facility: HOSPITAL | Age: 74
LOS: 1 days | Discharge: HOME | End: 2024-09-18
Attending: STUDENT IN AN ORGANIZED HEALTH CARE EDUCATION/TRAINING PROGRAM | Admitting: STUDENT IN AN ORGANIZED HEALTH CARE EDUCATION/TRAINING PROGRAM
Payer: MEDICARE

## 2024-09-16 VITALS
HEART RATE: 92 BPM | TEMPERATURE: 98 F | RESPIRATION RATE: 19 BRPM | DIASTOLIC BLOOD PRESSURE: 46 MMHG | OXYGEN SATURATION: 99 % | SYSTOLIC BLOOD PRESSURE: 74 MMHG

## 2024-09-16 DIAGNOSIS — R07.9 CHEST PAIN, UNSPECIFIED: ICD-10-CM

## 2024-09-16 DIAGNOSIS — Z29.9 ENCOUNTER FOR PROPHYLACTIC MEASURES, UNSPECIFIED: ICD-10-CM

## 2024-09-16 DIAGNOSIS — B34.1 ENTEROVIRUS INFECTION, UNSPECIFIED: ICD-10-CM

## 2024-09-16 DIAGNOSIS — Z95.810 PRESENCE OF AUTOMATIC (IMPLANTABLE) CARDIAC DEFIBRILLATOR: Chronic | ICD-10-CM

## 2024-09-16 DIAGNOSIS — N28.9 DISORDER OF KIDNEY AND URETER, UNSPECIFIED: ICD-10-CM

## 2024-09-16 DIAGNOSIS — Z95.5 PRESENCE OF CORONARY ANGIOPLASTY IMPLANT AND GRAFT: Chronic | ICD-10-CM

## 2024-09-16 DIAGNOSIS — I25.10 ATHEROSCLEROTIC HEART DISEASE OF NATIVE CORONARY ARTERY WITHOUT ANGINA PECTORIS: ICD-10-CM

## 2024-09-16 DIAGNOSIS — Z79.899 OTHER LONG TERM (CURRENT) DRUG THERAPY: ICD-10-CM

## 2024-09-16 DIAGNOSIS — Z95.1 PRESENCE OF AORTOCORONARY BYPASS GRAFT: Chronic | ICD-10-CM

## 2024-09-16 DIAGNOSIS — I50.22 CHRONIC SYSTOLIC (CONGESTIVE) HEART FAILURE: ICD-10-CM

## 2024-09-16 DIAGNOSIS — E11.9 TYPE 2 DIABETES MELLITUS WITHOUT COMPLICATIONS: ICD-10-CM

## 2024-09-16 DIAGNOSIS — E03.9 HYPOTHYROIDISM, UNSPECIFIED: ICD-10-CM

## 2024-09-16 DIAGNOSIS — I10 ESSENTIAL (PRIMARY) HYPERTENSION: ICD-10-CM

## 2024-09-16 LAB
A1C WITH ESTIMATED AVERAGE GLUCOSE RESULT: 7 % — HIGH (ref 4–5.6)
ADD ON TEST-SPECIMEN IN LAB: SIGNIFICANT CHANGE UP
ADD ON TEST-SPECIMEN IN LAB: SIGNIFICANT CHANGE UP
ALBUMIN SERPL ELPH-MCNC: 4.2 G/DL — SIGNIFICANT CHANGE UP (ref 3.3–5)
ALP SERPL-CCNC: 89 U/L — SIGNIFICANT CHANGE UP (ref 40–120)
ALT FLD-CCNC: 23 U/L — SIGNIFICANT CHANGE UP (ref 4–33)
ANION GAP SERPL CALC-SCNC: 10 MMOL/L — SIGNIFICANT CHANGE UP (ref 7–14)
ANION GAP SERPL CALC-SCNC: 19 MMOL/L — HIGH (ref 7–14)
APTT BLD: 28.2 SEC — SIGNIFICANT CHANGE UP (ref 24.5–35.6)
AST SERPL-CCNC: 31 U/L — SIGNIFICANT CHANGE UP (ref 4–32)
B PERT DNA SPEC QL NAA+PROBE: SIGNIFICANT CHANGE UP
B PERT+PARAPERT DNA PNL SPEC NAA+PROBE: SIGNIFICANT CHANGE UP
BASOPHILS # BLD AUTO: 0.03 K/UL — SIGNIFICANT CHANGE UP (ref 0–0.2)
BASOPHILS NFR BLD AUTO: 0.3 % — SIGNIFICANT CHANGE UP (ref 0–2)
BILIRUB SERPL-MCNC: 0.4 MG/DL — SIGNIFICANT CHANGE UP (ref 0.2–1.2)
BUN SERPL-MCNC: 12 MG/DL — SIGNIFICANT CHANGE UP (ref 7–23)
BUN SERPL-MCNC: 15 MG/DL — SIGNIFICANT CHANGE UP (ref 7–23)
C PNEUM DNA SPEC QL NAA+PROBE: SIGNIFICANT CHANGE UP
CALCIUM SERPL-MCNC: 8.1 MG/DL — LOW (ref 8.4–10.5)
CALCIUM SERPL-MCNC: 9.7 MG/DL — SIGNIFICANT CHANGE UP (ref 8.4–10.5)
CHLORIDE SERPL-SCNC: 100 MMOL/L — SIGNIFICANT CHANGE UP (ref 98–107)
CHLORIDE SERPL-SCNC: 107 MMOL/L — SIGNIFICANT CHANGE UP (ref 98–107)
CHOLEST SERPL-MCNC: 119 MG/DL — SIGNIFICANT CHANGE UP
CK MB BLD-MCNC: 2.8 % — HIGH (ref 0–2.5)
CK MB BLD-MCNC: 4.2 % — HIGH (ref 0–2.5)
CK MB BLD-MCNC: 5.5 % — HIGH (ref 0–2.5)
CK MB BLD-MCNC: 5.8 % — HIGH (ref 0–2.5)
CK MB CFR SERPL CALC: 2.1 NG/ML — SIGNIFICANT CHANGE UP
CK MB CFR SERPL CALC: 3.5 NG/ML — SIGNIFICANT CHANGE UP
CK MB CFR SERPL CALC: 3.8 NG/ML — SIGNIFICANT CHANGE UP
CK MB CFR SERPL CALC: 4.6 NG/ML — SIGNIFICANT CHANGE UP
CK SERPL-CCNC: 69 U/L — SIGNIFICANT CHANGE UP (ref 25–170)
CK SERPL-CCNC: 76 U/L — SIGNIFICANT CHANGE UP (ref 25–170)
CK SERPL-CCNC: 80 U/L — SIGNIFICANT CHANGE UP (ref 25–170)
CK SERPL-CCNC: 83 U/L — SIGNIFICANT CHANGE UP (ref 25–170)
CO2 SERPL-SCNC: 19 MMOL/L — LOW (ref 22–31)
CO2 SERPL-SCNC: 23 MMOL/L — SIGNIFICANT CHANGE UP (ref 22–31)
CREAT SERPL-MCNC: 0.68 MG/DL — SIGNIFICANT CHANGE UP (ref 0.5–1.3)
CREAT SERPL-MCNC: 0.99 MG/DL — SIGNIFICANT CHANGE UP (ref 0.5–1.3)
D DIMER BLD IA.RAPID-MCNC: <150 NG/ML DDU — SIGNIFICANT CHANGE UP
EGFR: 60 ML/MIN/1.73M2 — SIGNIFICANT CHANGE UP
EGFR: 91 ML/MIN/1.73M2 — SIGNIFICANT CHANGE UP
EOSINOPHIL # BLD AUTO: 0.4 K/UL — SIGNIFICANT CHANGE UP (ref 0–0.5)
EOSINOPHIL NFR BLD AUTO: 4.5 % — SIGNIFICANT CHANGE UP (ref 0–6)
ESTIMATED AVERAGE GLUCOSE: 154 — SIGNIFICANT CHANGE UP
FLUAV AG NPH QL: SIGNIFICANT CHANGE UP
FLUAV SUBTYP SPEC NAA+PROBE: SIGNIFICANT CHANGE UP
FLUBV AG NPH QL: SIGNIFICANT CHANGE UP
FLUBV RNA SPEC QL NAA+PROBE: SIGNIFICANT CHANGE UP
GAS PNL BLDV: SIGNIFICANT CHANGE UP
GLUCOSE BLDC GLUCOMTR-MCNC: 113 MG/DL — HIGH (ref 70–99)
GLUCOSE BLDC GLUCOMTR-MCNC: 113 MG/DL — HIGH (ref 70–99)
GLUCOSE BLDC GLUCOMTR-MCNC: 123 MG/DL — HIGH (ref 70–99)
GLUCOSE BLDC GLUCOMTR-MCNC: 140 MG/DL — HIGH (ref 70–99)
GLUCOSE BLDC GLUCOMTR-MCNC: 98 MG/DL — SIGNIFICANT CHANGE UP (ref 70–99)
GLUCOSE SERPL-MCNC: 188 MG/DL — HIGH (ref 70–99)
GLUCOSE SERPL-MCNC: 96 MG/DL — SIGNIFICANT CHANGE UP (ref 70–99)
HADV DNA SPEC QL NAA+PROBE: SIGNIFICANT CHANGE UP
HCOV 229E RNA SPEC QL NAA+PROBE: SIGNIFICANT CHANGE UP
HCOV HKU1 RNA SPEC QL NAA+PROBE: SIGNIFICANT CHANGE UP
HCOV NL63 RNA SPEC QL NAA+PROBE: SIGNIFICANT CHANGE UP
HCOV OC43 RNA SPEC QL NAA+PROBE: SIGNIFICANT CHANGE UP
HCT VFR BLD CALC: 44.1 % — SIGNIFICANT CHANGE UP (ref 34.5–45)
HDLC SERPL-MCNC: 56 MG/DL — SIGNIFICANT CHANGE UP
HGB BLD-MCNC: 14.5 G/DL — SIGNIFICANT CHANGE UP (ref 11.5–15.5)
HMPV RNA SPEC QL NAA+PROBE: SIGNIFICANT CHANGE UP
HPIV1 RNA SPEC QL NAA+PROBE: SIGNIFICANT CHANGE UP
HPIV2 RNA SPEC QL NAA+PROBE: SIGNIFICANT CHANGE UP
HPIV3 RNA SPEC QL NAA+PROBE: SIGNIFICANT CHANGE UP
HPIV4 RNA SPEC QL NAA+PROBE: SIGNIFICANT CHANGE UP
IANC: 5.28 K/UL — SIGNIFICANT CHANGE UP (ref 1.8–7.4)
IMM GRANULOCYTES NFR BLD AUTO: 0.2 % — SIGNIFICANT CHANGE UP (ref 0–0.9)
INR BLD: 0.92 RATIO — SIGNIFICANT CHANGE UP (ref 0.85–1.18)
LACTATE SERPL-SCNC: 1 MMOL/L — SIGNIFICANT CHANGE UP (ref 0.5–2)
LIPID PNL WITH DIRECT LDL SERPL: 53 MG/DL — SIGNIFICANT CHANGE UP
LYMPHOCYTES # BLD AUTO: 2.63 K/UL — SIGNIFICANT CHANGE UP (ref 1–3.3)
LYMPHOCYTES # BLD AUTO: 29.8 % — SIGNIFICANT CHANGE UP (ref 13–44)
M PNEUMO DNA SPEC QL NAA+PROBE: SIGNIFICANT CHANGE UP
MAGNESIUM SERPL-MCNC: 2.1 MG/DL — SIGNIFICANT CHANGE UP (ref 1.6–2.6)
MCHC RBC-ENTMCNC: 27.7 PG — SIGNIFICANT CHANGE UP (ref 27–34)
MCHC RBC-ENTMCNC: 32.9 GM/DL — SIGNIFICANT CHANGE UP (ref 32–36)
MCV RBC AUTO: 84.2 FL — SIGNIFICANT CHANGE UP (ref 80–100)
MONOCYTES # BLD AUTO: 0.48 K/UL — SIGNIFICANT CHANGE UP (ref 0–0.9)
MONOCYTES NFR BLD AUTO: 5.4 % — SIGNIFICANT CHANGE UP (ref 2–14)
NEUTROPHILS # BLD AUTO: 5.28 K/UL — SIGNIFICANT CHANGE UP (ref 1.8–7.4)
NEUTROPHILS NFR BLD AUTO: 59.8 % — SIGNIFICANT CHANGE UP (ref 43–77)
NON HDL CHOLESTEROL: 63 MG/DL — SIGNIFICANT CHANGE UP
NRBC # BLD: 0 /100 WBCS — SIGNIFICANT CHANGE UP (ref 0–0)
NRBC # FLD: 0 K/UL — SIGNIFICANT CHANGE UP (ref 0–0)
NT-PROBNP SERPL-SCNC: 1061 PG/ML — HIGH
PHOSPHATE SERPL-MCNC: 4.3 MG/DL — SIGNIFICANT CHANGE UP (ref 2.5–4.5)
PLATELET # BLD AUTO: 252 K/UL — SIGNIFICANT CHANGE UP (ref 150–400)
POTASSIUM SERPL-MCNC: 3.9 MMOL/L — SIGNIFICANT CHANGE UP (ref 3.5–5.3)
POTASSIUM SERPL-MCNC: 4.5 MMOL/L — SIGNIFICANT CHANGE UP (ref 3.5–5.3)
POTASSIUM SERPL-SCNC: 3.9 MMOL/L — SIGNIFICANT CHANGE UP (ref 3.5–5.3)
POTASSIUM SERPL-SCNC: 4.5 MMOL/L — SIGNIFICANT CHANGE UP (ref 3.5–5.3)
PROT SERPL-MCNC: 8.1 G/DL — SIGNIFICANT CHANGE UP (ref 6–8.3)
PROTHROM AB SERPL-ACNC: 10.4 SEC — SIGNIFICANT CHANGE UP (ref 9.5–13)
RAPID RVP RESULT: DETECTED
RBC # BLD: 5.24 M/UL — HIGH (ref 3.8–5.2)
RBC # FLD: 14.2 % — SIGNIFICANT CHANGE UP (ref 10.3–14.5)
RSV RNA NPH QL NAA+NON-PROBE: SIGNIFICANT CHANGE UP
RSV RNA SPEC QL NAA+PROBE: SIGNIFICANT CHANGE UP
RV+EV RNA SPEC QL NAA+PROBE: DETECTED
SARS-COV-2 RNA SPEC QL NAA+PROBE: SIGNIFICANT CHANGE UP
SARS-COV-2 RNA SPEC QL NAA+PROBE: SIGNIFICANT CHANGE UP
SODIUM SERPL-SCNC: 138 MMOL/L — SIGNIFICANT CHANGE UP (ref 135–145)
SODIUM SERPL-SCNC: 140 MMOL/L — SIGNIFICANT CHANGE UP (ref 135–145)
TRIGL SERPL-MCNC: 48 MG/DL — SIGNIFICANT CHANGE UP
TROPONIN T, HIGH SENSITIVITY RESULT: 23 NG/L — SIGNIFICANT CHANGE UP
TROPONIN T, HIGH SENSITIVITY RESULT: 57 NG/L — CRITICAL HIGH
TROPONIN T, HIGH SENSITIVITY RESULT: 71 NG/L — CRITICAL HIGH
TROPONIN T, HIGH SENSITIVITY RESULT: 73 NG/L — CRITICAL HIGH
WBC # BLD: 8.84 K/UL — SIGNIFICANT CHANGE UP (ref 3.8–10.5)
WBC # FLD AUTO: 8.84 K/UL — SIGNIFICANT CHANGE UP (ref 3.8–10.5)

## 2024-09-16 PROCEDURE — 74174 CTA ABD&PLVS W/CONTRAST: CPT | Mod: 26,MC

## 2024-09-16 PROCEDURE — 99285 EMERGENCY DEPT VISIT HI MDM: CPT | Mod: 25

## 2024-09-16 PROCEDURE — 71045 X-RAY EXAM CHEST 1 VIEW: CPT | Mod: 26

## 2024-09-16 PROCEDURE — 99222 1ST HOSP IP/OBS MODERATE 55: CPT

## 2024-09-16 PROCEDURE — 71275 CT ANGIOGRAPHY CHEST: CPT | Mod: 26,MC

## 2024-09-16 PROCEDURE — 76770 US EXAM ABDO BACK WALL COMP: CPT | Mod: 26

## 2024-09-16 PROCEDURE — 99223 1ST HOSP IP/OBS HIGH 75: CPT

## 2024-09-16 PROCEDURE — 93279 PRGRMG DEV EVAL PM/LDLS PM: CPT | Mod: 26

## 2024-09-16 RX ORDER — ROSUVASTATIN CALCIUM 10 MG/1
5 TABLET ORAL AT BEDTIME
Refills: 0 | Status: DISCONTINUED | OUTPATIENT
Start: 2024-09-16 | End: 2024-09-18

## 2024-09-16 RX ORDER — ASPIRIN 81 MG
81 TABLET, DELAYED RELEASE (ENTERIC COATED) ORAL DAILY
Refills: 0 | Status: DISCONTINUED | OUTPATIENT
Start: 2024-09-16 | End: 2024-09-18

## 2024-09-16 RX ORDER — SODIUM CHLORIDE 9 MG/ML
500 INJECTION INTRAMUSCULAR; INTRAVENOUS; SUBCUTANEOUS ONCE
Refills: 0 | Status: COMPLETED | OUTPATIENT
Start: 2024-09-16 | End: 2024-09-16

## 2024-09-16 RX ORDER — ACETAMINOPHEN 325 MG/1
1000 TABLET ORAL ONCE
Refills: 0 | Status: COMPLETED | OUTPATIENT
Start: 2024-09-16 | End: 2024-09-16

## 2024-09-16 RX ORDER — ROSUVASTATIN CALCIUM 10 MG/1
1 TABLET ORAL
Refills: 0 | DISCHARGE

## 2024-09-16 RX ORDER — PANTOPRAZOLE SODIUM 40 MG
40 TABLET, DELAYED RELEASE (ENTERIC COATED) ORAL
Refills: 0 | Status: DISCONTINUED | OUTPATIENT
Start: 2024-09-16 | End: 2024-09-18

## 2024-09-16 RX ORDER — DEXTROSE 15 G/33 G
25 GEL IN PACKET (GRAM) ORAL ONCE
Refills: 0 | Status: DISCONTINUED | OUTPATIENT
Start: 2024-09-16 | End: 2024-09-18

## 2024-09-16 RX ORDER — ENOXAPARIN SODIUM 100 MG/ML
40 INJECTION SUBCUTANEOUS EVERY 24 HOURS
Refills: 0 | Status: DISCONTINUED | OUTPATIENT
Start: 2024-09-16 | End: 2024-09-18

## 2024-09-16 RX ORDER — DEXTROSE 15 G/33 G
15 GEL IN PACKET (GRAM) ORAL ONCE
Refills: 0 | Status: DISCONTINUED | OUTPATIENT
Start: 2024-09-16 | End: 2024-09-16

## 2024-09-16 RX ORDER — FAMOTIDINE 10 MG/ML
20 INJECTION INTRAVENOUS ONCE
Refills: 0 | Status: COMPLETED | OUTPATIENT
Start: 2024-09-16 | End: 2024-09-16

## 2024-09-16 RX ORDER — LEVOTHYROXINE SODIUM 100 MCG
25 TABLET ORAL DAILY
Refills: 0 | Status: DISCONTINUED | OUTPATIENT
Start: 2024-09-16 | End: 2024-09-18

## 2024-09-16 RX ADMIN — ROSUVASTATIN CALCIUM 5 MILLIGRAM(S): 10 TABLET ORAL at 22:46

## 2024-09-16 RX ADMIN — Medication 4 MILLIGRAM(S): at 02:53

## 2024-09-16 RX ADMIN — FAMOTIDINE 20 MILLIGRAM(S): 10 INJECTION INTRAVENOUS at 02:54

## 2024-09-16 RX ADMIN — Medication 81 MILLIGRAM(S): at 11:52

## 2024-09-16 RX ADMIN — SODIUM CHLORIDE 500 MILLILITER(S): 9 INJECTION INTRAMUSCULAR; INTRAVENOUS; SUBCUTANEOUS at 01:46

## 2024-09-16 RX ADMIN — Medication 75 MILLIGRAM(S): at 13:14

## 2024-09-16 RX ADMIN — ACETAMINOPHEN 400 MILLIGRAM(S): 325 TABLET ORAL at 03:23

## 2024-09-16 RX ADMIN — Medication 4 MILLIGRAM(S): at 01:46

## 2024-09-16 RX ADMIN — ENOXAPARIN SODIUM 40 MILLIGRAM(S): 100 INJECTION SUBCUTANEOUS at 11:52

## 2024-09-16 NOTE — ED PROVIDER NOTE - CLINICAL SUMMARY MEDICAL DECISION MAKING FREE TEXT BOX
74 year old female with PMH of severe CAD with MI s/p CABG x3 (LIMA-LAD, SVG-OM1, SVG-RCA in 2008 at [facility], multiple PCI with total 16 stents, recent MI in 4 /2024 s/p LHC (no PCI) at [facility], chronic HFrEF, history of VT s/p ICD (single chamber ST Wisam Fortify VR [id number] by Dr. Johnson initial implant in 2012 complicated by lead malfunction and reimplant in 02/2013) HTN, HLD presenting with sudden onset severe L sided nonradiating chest pain x3hrs. Minimal relief from nitro patch x1, nitro spray x1, ASA x2 at home prior to arrival. Pt hypotensive in ED, clutching chest and uncomfortable appearing on exam. Will do ACS workup and dissection work up. Will contact cardiology and coordinate care.

## 2024-09-16 NOTE — ED ADULT NURSE REASSESSMENT NOTE - NS ED NURSE REASSESS COMMENT FT1
Pt resting comfortably, A&Ox4. Pt states relief of symptoms after medication. Pt denies c/p, SOB, n/v/d, headache, blurry vision, or fever like symptoms. Pt pending bed assignment. Respirations even and unlabored. NSR on cardiac monitor. Comfort measures provide, safety maintained. Plan of care ongoing.

## 2024-09-16 NOTE — CONSULT NOTE ADULT - SUBJECTIVE AND OBJECTIVE BOX
HPI: 74 year old female with PMH of severe CAD with MI s/p CABG x3 (LIMA-LAD, SVG-OM1, SVG-RCA in 2008 at [facility], multiple PCI with total 16 stents, recent MI in 4 /2024 s/p LHC (no PCI) at [facility], chronic HFrEF, history of VT s/p ICD (single chamber ST Wisam by Dr. Johnson initial implant in 2012 complicated by lead malfunction and reimplant in 02/2013), recent ICD pocket revision and generator change (6/2024) HTN, HLD, DMT2 presenting to ED due to chest pain for which cardiology was consulted .    Pt says she was woken up by sudden onset severe left sided chest pain, located on top of her ICD location, associated w shortness of breath and thus presented to ED. Pt denies any prior simlar symptoms in past. Per family she has been chronically having exertional sob. Pt has been compliant w her meds. In ED, Pt received morphine x2 and is awaiting CTA to r/o acute aortic pathology. EKG showing NSR and LBBB and no acute ischemic changes.       ROS as above    T(C): 36.4 (09-16-24 @ 01:00), Max: 36.4 (09-16-24 @ 01:00)  HR: 90 (09-16-24 @ 01:45) (90 - 98)  BP: 99/64 (09-16-24 @ 01:45) (74/46 - 100/70)  RR: 18 (09-16-24 @ 01:45) (18 - 19)  SpO2: 100% (09-16-24 @ 01:45) (99% - 100%)    MEDICATIONS  (STANDING):  famotidine Injectable 20 milliGRAM(s) IV Push once  morphine  - Injectable 4 milliGRAM(s) IV Push Once    MEDICATIONS  (PRN):      I&O's Summary                        14.5                 138  | 19   | 15           8.84  >-----------< 252     ------------------------< 188                   44.1                 4.5  | 100  | 0.99                                         Ca 9.7   Mg x     Ph x      Urinalysis Basic - ( 16 Sep 2024 01:00 )    Color: x / Appearance: x / SG: x / pH: x  Gluc: 188 mg/dL / Ketone: x  / Bili: x / Urobili: x   Blood: x / Protein: x / Nitrite: x   Leuk Esterase: x / RBC: x / WBC x   Sq Epi: x / Non Sq Epi: x / Bacteria: x          < from: TTE W or WO Ultrasound Enhancing Agent (06.10.24 @ 09:52) >  CPT:                ECHO TTE WITH CON COMP W DOPP - .m;DEFINITY ECHO                      CONTRAST PER ML - .m  Indication(s):      Atherosclerotic heart disease of native coronary artery                      without angina pectoris - I25.10  Procedure:          Transthoracic echocardiogram with 2-D, M-mode and complete                      spectral and color flow Doppler.  Ordering Location:  [facility]  Admission Status:   Inpatient  Contrast Injection: Verbal consent was obtained for injection of Ultrasonic                      Enhancing Agent following a discussion of risks and                      benefits.                      Endocardial visualizationenhanced with 2 ml of Definity                      Ultrasound enhancing agent (Lot#:[id number] Exp.Date:5/25                      Discarded Dose:8ml).  Study Information:  Image quality for this study is technically difficult.    _______________________________________________________________________________________     CONCLUSIONS:      1. Technically difficult image quality.   2. The left ventricular cavity is normal in size. Left ventricular wall thickness is normal. Left ventricular systolic function is moderately decreased with a calculated ejection fraction of 37 % by the García's biplane method of disks. There are regional wall motion abnormalities present. Hypokinesis of the inferolateral wall, basal to mid inferior wall, and basal inferoseptum.   3. Structurally normal mitral valve with normal leaflet excursion. There is calcification of the mitral valve annulus. There is moderate to severe mitral regurgitation.   4. No prior echocardiogram is available for comparison.    < end of copied text >       HPI: 74 year old female with PMH of severe CAD with MI s/p CABG x3 (LIMA-LAD, SVG-OM1, SVG-RCA in 2008 at [facility], multiple PCI with total 16 stents, recent MI in 4 /2024 s/p LHC (no PCI) at [facility], chronic HFrEF, history of VT s/p ICD (single chamber ST Wisam by Dr. Johnson initial implant in 2012 complicated by lead malfunction and reimplant in 02/2013), recent ICD pocket revision and generator change (6/2024) HTN, HLD, DMT2 presenting to ED due to chest pain for which cardiology was consulted .    Pt says she was woken up by sudden onset severe left sided chest pain, located on top of her ICD location, associated w shortness of breath and thus presented to ED. Pt denies any prior simlar symptoms in past. Per family she has been chronically having exertional sob. Pt has been compliant w her meds. In ED, Pt received morphine x2 and is awaiting CTA to r/o acute aortic pathology. EKG showing NSR and LBBB and no acute ischemic changes.     ROS as above    T(C): 36.4 (09-16-24 @ 01:00), Max: 36.4 (09-16-24 @ 01:00)  HR: 90 (09-16-24 @ 01:45) (90 - 98)  BP: 99/64 (09-16-24 @ 01:45) (74/46 - 100/70)  RR: 18 (09-16-24 @ 01:45) (18 - 19)  SpO2: 100% (09-16-24 @ 01:45) (99% - 100%)    MEDICATIONS  (STANDING):  famotidine Injectable 20 milliGRAM(s) IV Push once  morphine  - Injectable 4 milliGRAM(s) IV Push Once    MEDICATIONS  (PRN):      I&O's Summary                        14.5                 138  | 19   | 15           8.84  >-----------< 252     ------------------------< 188                   44.1                 4.5  | 100  | 0.99                                         Ca 9.7   Mg x     Ph x      Urinalysis Basic - ( 16 Sep 2024 01:00 )    Color: x / Appearance: x / SG: x / pH: x  Gluc: 188 mg/dL / Ketone: x  / Bili: x / Urobili: x   Blood: x / Protein: x / Nitrite: x   Leuk Esterase: x / RBC: x / WBC x   Sq Epi: x / Non Sq Epi: x / Bacteria: x          < from: TTE W or WO Ultrasound Enhancing Agent (06.10.24 @ 09:52) >  CPT:                ECHO TTE WITH CON COMP W DOPP - .m;DEFINITY ECHO                      CONTRAST PER ML - .m  Indication(s):      Atherosclerotic heart disease of native coronary artery                      without angina pectoris - I25.10  Procedure:          Transthoracic echocardiogram with 2-D, M-mode and complete                      spectral and color flow Doppler.  Ordering Location:  [facility]  Admission Status:   Inpatient  Contrast Injection: Verbal consent was obtained for injection of Ultrasonic                      Enhancing Agent following a discussion of risks and                      benefits.                      Endocardial visualizationenhanced with 2 ml of Definity                      Ultrasound enhancing agent (Lot#:[id number] Exp.Date:5/25                      Discarded Dose:8ml).  Study Information:  Image quality for this study is technically difficult.    _______________________________________________________________________________________     CONCLUSIONS:      1. Technically difficult image quality.   2. The left ventricular cavity is normal in size. Left ventricular wall thickness is normal. Left ventricular systolic function is moderately decreased with a calculated ejection fraction of 37 % by the García's biplane method of disks. There are regional wall motion abnormalities present. Hypokinesis of the inferolateral wall, basal to mid inferior wall, and basal inferoseptum.   3. Structurally normal mitral valve with normal leaflet excursion. There is calcification of the mitral valve annulus. There is moderate to severe mitral regurgitation.   4. No prior echocardiogram is available for comparison.    < end of copied text >      < from: CT Angio Abdomen and Pelvis w/ IV Cont (09.16.24 @ 02:17) >  CT Angiography of the Chest, Abdomen and Pelvis.  Precontrast imaging was performed through the chest followed by arterial   phase imaging of the chest, abdomen and pelvis.  Sagittal and coronal reformats were performed as well as 3D (MIP)   reconstructions.    FINDINGS:  CHEST:  LUNGS AND LARGE AIRWAYS: Patent central airways. No consolidations.   Linear atelectasis in the left upper lobe.  PLEURA: No pleural effusion.  VESSELS: Aortic calcifications. Coronary artery calcifications/stones. No   evidence of aortic aneurysm or dissection. No evidence of PE.  HEART: Status post CABG. Heart is of upper limits of normal/minimally   enlarged. No pericardial effusion.AICD lead within the right ventricle.  MEDIASTINUM AND ELISE: Mild mediastinal and bilateral hilar   lymphadenopathy again noted, likely reactive.  CHEST WALL AND LOWER NECK: Left chest wall AICD. A few subcentimeter   hypodensities are seen in the left thyroid lobe, similar to prior study    ABDOMEN AND PELVIS:  LIVER: Mild hepatomegaly, measuring 17.6 cm in greatest craniocaudad   dimension.  BILE DUCTS: Normal caliber.  GALLBLADDER: Status post cholecystectomy with expected biliary   dilatation, CBD measuring up to 1.4 cm diameter, which gradually tapers   to normal caliber as it approaches the ampulla.  SPLEEN: Within normal limits.  PANCREAS: Within normal limits.  ADRENALS: Within normal limits.  KIDNEYS/URETERS: A 1 cm rounded hypodensity is seen in the upper pole of   the right kidney which measure greater than water density.    BLADDER: Within normal limits.  REPRODUCTIVE ORGANS: Uterus and adnexa within normal limits.    BOWEL: Mild colonic diverticulosis. No bowel obstruction. Appendix is   normal.  PERITONEUM/RETROPERITONEUM: Within normal limits.  VESSELS: Mild/moderate atherosclerotic changes. No evidence of aortic   aneurysm or dissection. Patent branches. Mild/moderate narrowing of the   proximal right renal artery withmild narrowing of the proximal left   renal artery.  LYMPH NODES: No lymphadenopathy.  ABDOMINAL WALL: Within normal limits.  BONES: Degenerative changes. Grade 2 anterolisthesis L5 on S1   anterolisthesis with bilateral pars defect. Sternotomy wiresnoted.    IMPRESSION:  No evidence of PE. No evidence of aortic acute dissection or aneurysm.    A 1 cm rounded hypodensity in the upper pole of the right kidney which   measure greater than water density. Nonemergent renal ultrasound is   recommended for further evaluation.    A few subcentimeter hypodensities in the left thyroid lobe, similar to   prior study. Nonemergent thyroid ultrasound is recommended for further   evaluation.    Other findings, as above.    --- End of Report ---    < end of copied text >

## 2024-09-16 NOTE — H&P ADULT - PROBLEM SELECTOR PLAN 1
currently chest pain free  - EKG non ischemic, Trops 57 --> 71 --> 73   - CTA C/A/P neg for PE and dissection  - appreciate cards eval - low suspicion for ACS, possibly related to infectious etiology   - EP consulted for ICD interrogation   - TTE, monitor on telemetry

## 2024-09-16 NOTE — PROCEDURE NOTE - ADDITIONAL PROCEDURE DETAILS
Single ICD in VVI mode   Normal sensing and pacing via iterative testing  Excellent threshold capture  No events recorded   No reprogramming

## 2024-09-16 NOTE — H&P ADULT - NSICDXPASTSURGICALHX_GEN_ALL_CORE_FT
PAST SURGICAL HISTORY:  History of implantable cardiac defibrillator (ICD)     History of quadruple bypass     S/P coronary artery bypass graft x 3     S/P coronary artery stent placement

## 2024-09-16 NOTE — CONSULT NOTE ADULT - CARDIOVASCULAR
warm extremities/normal/regular rate and rhythm/S1 S2 present/no gallops/no rub/no murmur/no pedal edema

## 2024-09-16 NOTE — H&P ADULT - ASSESSMENT
74F with hx of severe CAD c/b MI s/p CABG x3 and PCI with total 16 stents, recent MI (4/2024 s/p LHC at [facility]), HFrEF (EF 37% 06/2024) s/p [employer] ICD (initial implant in 2012 complicated by lead malfunction, reimplant in 02/2013,  ICD pocket revision and generator change 6/2024), HTN, HLD, T2DM, Hypothyroidism who presents with chest pain and URI sx, r/o'd for ACS, found to be hypotensive likely d/t nitro patch, and entero-rhinovirus positive.

## 2024-09-16 NOTE — ED ADULT NURSE REASSESSMENT NOTE - NS ED NURSE REASSESS COMMENT FT1
Pt laying in bed, respirations even and unlabored, NSR on the monitor, VS in flow sheet. Soledad DEXTER and James Dexter aware of BP and VS, ok to give morphine. Pt medicated per MD orders. Bed in lowest position, safety maintained.

## 2024-09-16 NOTE — ED PROVIDER NOTE - ATTENDING CONTRIBUTION TO CARE
I, Juarez Rowe DO,  performed the initial face to face bedside interview with this patient regarding history of present illness, review of symptoms and relevant past medical, social and family history.  I completed an independent physical examination.  I was the initial provider who evaluated this patient. I have signed out the follow up of any pending tests (i.e. labs, radiological studies) to the resident.  I have communicated the patient’s plan of care and disposition with the resident.  The history, relevant review of systems, past medical and surgical history, medical decision making, and physical examination was documented by the scribe in my presence and I attest to the accuracy of the documentation.    adult female with extensive cardiac hx presents with acute onset chest pain. BP low on arrival, but improved shortly after nitro patch was removed. vss. HR normal EKG with st changes - cards consulted - NO stemi. first trop negative. CTA pending. cards at bedside. rpt ekg done w/o changes. pending 2nd trop. pt already took ASA at home prior to arrival. morphine given x2 for pain control.     shared pe  Constitutional: appears to be in pain, AAOx3  Eyes: PERRLA EOMI  Head: Normocephalic atraumatic  Mouth: MMM  Cardiac: regular rate   Resp: Lungs CTAB  GI: Abd s/nt/nd  Neuro: CN2-12 intact  Skin: No visible rashes

## 2024-09-16 NOTE — H&P ADULT - PROBLEM SELECTOR PLAN 9
patient unsure of medications, unable to reach family   - reviewed list from recent hospitalization June 2024 + emailed med rec pharmacist  - follow up pharmacy med rec  - clarify with family if patient still takes fluoxetine and resume accordingly

## 2024-09-16 NOTE — H&P ADULT - NSHPPHYSICALEXAM_GEN_ALL_CORE
Vital Signs Last 24 Hrs  T(C): 36.7 (16 Sep 2024 11:30), Max: 36.7 (16 Sep 2024 11:30)  T(F): 98 (16 Sep 2024 11:30), Max: 98 (16 Sep 2024 11:30)  HR: 72 (16 Sep 2024 11:30) (64 - 98)  BP: 97/56 (16 Sep 2024 11:30) (74/46 - 117/72)  BP(mean): 87 (16 Sep 2024 02:52) (77 - 87)  RR: 18 (16 Sep 2024 11:30) (18 - 19)  SpO2: 95% (16 Sep 2024 11:30) (95% - 100%)    Parameters below as of 16 Sep 2024 11:30  Patient On (Oxygen Delivery Method): room air        CONSTITUTIONAL: resting in bed comfortably   EYES: no conjunctival or scleral injection, non-icteric, PERRLA  ENMT: no external nasal lesions, oral mucosa with moist membranes  NECK: trachea midline, no palpable neck mass   RESPIRATORY: breathing comfortably, lungs CTA without wheeze/rales/rhonchi  CARDIOVASCULAR: regular rate and rhythm; +S1S2, no murmurs, rubs, or gallops, no lower extremity edema, 2+ peripheral pulses  GASTROINTESTINAL: soft, nontender, nondistended; +BS throughout, no rebound/guarding  MUSCULOSKELETAL: no joint effusions, normal strength and tone of extremities   NEUROLOGIC: non-focal, sensation intact to light touch in b/l upper and lower extremities   PSYCHIATRIC: AAOx3, appropriate mood and affect  SKIN: no rashes or lesions, warm

## 2024-09-16 NOTE — H&P ADULT - HISTORY OF PRESENT ILLNESS
74F with hx of severe CAD c/b MI s/p CABG x3 and PCI with total 16 stents, recent MI (4/2024 s/p LHC at [facility]), HFrEF (EF 37% 06/2024) s/p [employer] ICD (initial implant in 2012 complicated by lead malfunction, reimplant in 02/2013,  ICD pocket revision and generator change 6/2024), HTN, HLD, T2DM, Hypothyroidism who presents with chest pain and URI sx. Patient reports sudden onset left sided chest pain over ICD site associated with some SOB. Patient tried taking nitro patch x1, nitro spray x1, asa x2 with minimal relief. She also notes URI sx for past few days. Denies fevers, chills, abdominal pain, n/v/d, dysuria, sick contacts, recent travel.

## 2024-09-16 NOTE — CONSULT NOTE ADULT - ASSESSMENT
HPI: 74 year old female with PMH of severe CAD with MI s/p CABG x3 (LIMA-LAD, SVG-OM1, SVG-RCA in 2008 at [facility], multiple PCI with total 16 stents, recent MI in 4 /2024 s/p LHC (no PCI) at [facility], chronic HFrEF, history of VT s/p ICD (single chamber ST Wisam by Dr. Johnson initial implant in 2012 complicated by lead malfunction and reimplant in 02/2013), recent ICD pocket revision and generator change (6/2024) HTN, HLD, DMT2 presenting to ED due to chest pain for which cardiology was consulted .    Pt says she was woken up by sudden onset severe left sided chest pain, located on top of her ICD location, associated w shortness of breath and thus presented to ED. Pt denies any prior simlar symptoms in past. Per family she has been chronically having exertional sob. Pt has been compliant w her meds. In ED, Pt received morphine x2 and is awaiting CTA to r/o acute aortic pathology. EKG showing NSR and LBBB and no acute ischemic changes.     Assessment and Plan:  # Atypical chest pain  Pt appears uncomfortable on exam, endorses severe tenderness on top of ICD which is reproducible. Denies any recent shocks. EKG showing NSR, LBBB and no acute ischemic changes. Pt notes improvement in cp w morphine x2. Initial SBP in 70s which has improved w IV fluids. Pt does not appear grossly volume overloaded and extremities warm.   Trop  23-->  CK  -->  CKMB  -->  pBNP -->  D-dimer neg  Pending CTA results  - currently no evidence suggestive of ACS and cardiogenic shock  - would repeat cardiac enzymes w trop, CK, CKMB  - Continue tele monitoring  - TTE in AM  - Replete lytes to keep K>4, Mg>2  - check risk factor labs A1C, TSH, Lipid panel    Case discussed w fellow  Cardiology will continue to follow   HPI: 74 year old female with PMH of severe CAD with MI s/p CABG x3 (LIMA-LAD, SVG-OM1, SVG-RCA in 2008 at [facility], multiple PCI with total 16 stents, recent MI in 4 /2024 s/p LHC (no PCI) at [facility], chronic HFrEF, history of VT s/p ICD (single chamber ST Wisam by Dr. Johnson initial implant in 2012 complicated by lead malfunction and reimplant in 02/2013), recent ICD pocket revision and generator change (6/2024) HTN, HLD, DMT2 presenting to ED due to chest pain for which cardiology was consulted .    Pt says she was woken up by sudden onset severe left sided chest pain, located on top of her ICD location, associated w shortness of breath and thus presented to ED. Pt denies any prior simlar symptoms in past. Per family she has been chronically having exertional sob. Pt has been compliant w her meds. In ED, Pt received morphine x2 and is awaiting CTA to r/o acute aortic pathology. EKG showing NSR and LBBB and no acute ischemic changes.     Assessment and Plan:  # Atypical chest pain  Pt appears uncomfortable on exam, endorses severe tenderness on top of ICD which is reproducible. Denies any recent shocks. EKG showing NSR, LBBB and no acute ischemic changes. Pt notes improvement in cp w morphine x2. Initial SBP in 70s which has improved w IV fluids. Pt does not appear grossly volume overloaded and extremities warm. CTA w no PE or aortic dissection. Per family, she had been having flu like symptoms earlier today.   Trop  23-->  CK  76-->  CKMB  2.1-->  pBNP -->  D-dimer neg  - currently no evidence suggestive of ACS or cardiogenic shock  - would repeat cardiac enzymes w trop, CK, CKMB  - Continue tele monitoring  - TTE in AM  - Replete lytes to keep K>4, Mg>2  - would resume aspirin, plavix, statins, metoprolol, entresto, imdur.    Case discussed w fellow Dr Bob herron  Cardiology will continue to follow

## 2024-09-16 NOTE — ED PROVIDER NOTE - PROGRESS NOTE DETAILS
Herve Ramirez PGY3:  Reevaluated Pt. She states her pain has improved. Even prior to her morphine dose she reports some mild improvement in pain. Initial trop elevated will repeat. Continue to monitor. Herve Ramirez PGY3:  reevaluated Pt, BP has been improving after removal of Nitroglycerin patch.

## 2024-09-16 NOTE — H&P ADULT - PROBLEM SELECTOR PLAN 4
euvolemic  - TTE 06/2024 with EF of 37%  - EP consulted for ICD interrogation  - GDMT: holding home Toprol, Isosorbide, Entresto, Lasix, Spironolactone due to soft BPs, resume as BP allows  - monitor vital signs

## 2024-09-16 NOTE — H&P ADULT - NSICDXPASTMEDICALHX_GEN_ALL_CORE_FT
PAST MEDICAL HISTORY:  CAD in native artery     Coronary artery disease     Diabetes mellitus     DM2 (diabetes mellitus, type 2)     H/O: HTN (hypertension)     Hypertension     Hypothyroidism     ICD (implantable cardioverter-defibrillator) in place

## 2024-09-16 NOTE — ED ADULT NURSE REASSESSMENT NOTE - NS ED NURSE REASSESS COMMENT FT1
Break RN: Patient awake and resting in stretcher; respirations even and unlabored, no signs/symptoms of acute distress. Patient denies dyspnea, shortness of breath, and chest pain. Patient denies pain and offers no complaints at this time. FS rechecked, lunch provided, safety measures in place and call bell within reach. Family at bedside, will provide care as needed.

## 2024-09-16 NOTE — ED PROVIDER NOTE - OBJECTIVE STATEMENT
74 year old female with PMH of severe CAD with MI s/p CABG x3 (LIMA-LAD, SVG-OM1, SVG-RCA in 2008 at [facility], multiple PCI with total 16 stents, recent MI in 4 /2024 s/p LHC (no PCI) at [facility], chronic HFrEF, history of VT s/p ICD (single chamber ST Wisam Fortify VR [id number] by Dr. Johnson initial implant in 2012 complicated by lead malfunction and reimplant in 02/2013) HTN, HLD presenting with sudden onset chest pain x3hrs. Pain is L sided, constant, squeezing, nonradiating. No back pain, jaw pain, neck pain. Endorses nausea, no vomiting. Denies feeling shocking from AICD. Tried taking nitro patch x1, nitro spray x1, ASA x2 with minimal relief of sx prior to arrival. Pt also endorses congestion, runny nose, flu like sx earlier this afternoon.

## 2024-09-16 NOTE — ED ADULT NURSE REASSESSMENT NOTE - NS ED NURSE REASSESS COMMENT FT1
Report received from MARSHALL Cast. Pt resting in stretcher, A&O x 4, offers no complaints of pain at this time, NSR on monitor, VS as noted, RR equal and unlabored, safety maintained, comfort provided, awaiting bed placement.

## 2024-09-16 NOTE — ED ADULT NURSE NOTE - OBJECTIVE STATEMENT
Pt received to room 8, A&Ox4, ambulatory at baseline. Pt presenting to the ED today complaining of sudden onset chest pain that began earlier today and worsened. Pt states she took 2 sprays of nitro at home and upon arrival, pt wearing nitro patch. Pt /70 at this time. Pt screaming and crying endorsing pain. Pt Pt received to room 8, A&Ox4, ambulatory at baseline. Pt presenting to the ED today complaining of sudden onset chest pain that began earlier today and worsened. Hx HTN HLD, T2DM, CABG x3, CHF, VT with ICD placement, MI. Pt states she took 2 sprays of nitro at home and upon arrival, pt wearing nitro patch. Pt /70 at this time. Pt screaming and crying endorsing pain. Pt denies SOB, headache, n/v/d, blurry vision, dizziness, or fever like symptoms. Respirations even and unlabored. NSR on cardiac monitor. 20G IV placed in the right AC. Labs drawn and sent. Pt medicated as per MD orders. Comfort measures provided, safety maintained. Plan of care ongoing.

## 2024-09-16 NOTE — CONSULT NOTE ADULT - NS ATTEND AMEND GEN_ALL_CORE FT
Low suspicion for ACS, although second troponin is mildly elevated. Would check a third trop 3 hours from last (4:30 am). If continues to escalate, may need to consider ischemic evaluation. Given recent work up after 4/24 event, we may choose to defer this.  I see little benefit to echo here.     Would ask EPS to evaluate ICD wound as it's likely the cause of her sx, rather than ischemic CAD.

## 2024-09-16 NOTE — H&P ADULT - PROBLEM SELECTOR PLAN 7
check TSH  - c/w levothyroxine  - CT chest with incidental thyroid lesion noted - outpatient thyroid sonogram

## 2024-09-16 NOTE — ED ADULT TRIAGE NOTE - GLASGOW COMA SCALE: EYE OPENING, MLM
How Severe Is Your Skin Lesion?: mild Have Your Skin Lesions Been Treated?: not been treated Is This A New Presentation, Or A Follow-Up?: Skin Lesions (E4) spontaneous

## 2024-09-16 NOTE — ED ADULT TRIAGE NOTE - CHIEF COMPLAINT QUOTE
Pt c/o chest pain. Hx HTN HLD, T2DM, CABG x3, CHF, VT with ICD placement, MI. Pt screaming in triage unable to obtain temp or weight. BP 74/46. EKG in progress, charge aware

## 2024-09-16 NOTE — ED PROVIDER NOTE - PHYSICAL EXAMINATION
Gen: uncomfrtable appearing, clutching chest  Resp: No respiratory distress, speaking in full sentences  Neuro: AAOx3 Gen: uncomfortable appearing, clutching chest  Resp: No respiratory distress, speaking in full sentences  Ext: pulses equal b/l, no significant BP discretion with comparison of R arm and L arm   Neuro: AAOx3

## 2024-09-16 NOTE — H&P ADULT - PROBLEM SELECTOR PLAN 5
BPs soft (70s on admission now 100s), mentating  - likely due to nitro patch (now off) + nitro spray taken at home  - holding home meds - amlodipine, Toprol, Isosorbide, Entresto, Lasix, Spironolactone   - gradually resume home meds as BP allows  - monitor VS [id number]

## 2024-09-16 NOTE — H&P ADULT - PROBLEM SELECTOR PLAN 3
hx of severe CAD c/b MI s/p CABG x3 and PCI with total 16 stents, recent MI (4/2024 s/p LHC at [facility])  - c/w home aspirin, plavix, rosuvastatin  - holding home Toprol, Isosorbide due to soft BPs, resume as BP allows  - monitor vital signs

## 2024-09-16 NOTE — PHARMACOTHERAPY INTERVENTION NOTE - COMMENTS
Medication list updated in Outpatient Medication Record (OMR).  Sources: Lindsey, Patient, family member    Please note, last fill for the following was 6/12/2024 x 30 day supply (as per patient did not have refills):  isosorbide mononitrate 30 mg oral tablet, extended release: 1 tab(s) orally once a day (16 Sep 2024 15:41)  Lasix 40 mg oral tablet: 1 tab(s) orally once a day (16 Sep 2024 15:41)  spironolactone 25 mg oral tablet: 1 tab(s) orally once a day (16 Sep 2024 15:41)      Home Medications:  amLODIPine 10 mg oral tablet: 1 tab(s) orally once a day  (16 Sep 2024 15:41)  aspirin 81 mg oral delayed release tablet: 1 tab(s) orally once a day (16 Sep 2024 15:41)  clopidogrel 75 mg oral tablet: 1 tab(s) orally once a day (16 Sep 2024 15:41)  Entresto 24 mg-26 mg oral tablet: 1 tab(s) orally 2 times a day (16 Sep 2024 15:41)  Farxiga 5 mg oral tablet: 1 tab(s) orally once a day (16 Sep 2024 15:41)  FLUoxetine 10 mg oral tablet: 1 tab(s) orally once a day (16 Sep 2024 15:41)  levothyroxine 25 mcg (0.025 mg) oral tablet: 1 tab(s) orally once a day (16 Sep 2024 15:41)  metoprolol succinate 100 mg oral tablet, extended release: 1 tab(s) orally once a day (16 Sep 2024 15:41)  Mounjaro 7.5 mg/0.5 mL subcutaneous solution: 7.5 milligram(s) subcutaneously once a week once a week on Thursday (16 Sep 2024 15:41)  pantoprazole 40 mg oral delayed release tablet: 1 tab(s) orally once a day (16 Sep 2024 15:41)  rosuvastatin 5 mg oral tablet: 1 tab(s) orally once a day (16 Sep 2024 15:41)  Xiidra 5% ophthalmic solution: 1 drop(s) in each eye 2 times a day as needed for  dry eyes (16 Sep 2024 15:41)

## 2024-09-17 ENCOUNTER — RESULT REVIEW (OUTPATIENT)
Age: 74
End: 2024-09-17

## 2024-09-17 LAB
ANION GAP SERPL CALC-SCNC: 14 MMOL/L — SIGNIFICANT CHANGE UP (ref 7–14)
BUN SERPL-MCNC: 13 MG/DL — SIGNIFICANT CHANGE UP (ref 7–23)
CALCIUM SERPL-MCNC: 9 MG/DL — SIGNIFICANT CHANGE UP (ref 8.4–10.5)
CHLORIDE SERPL-SCNC: 103 MMOL/L — SIGNIFICANT CHANGE UP (ref 98–107)
CO2 SERPL-SCNC: 21 MMOL/L — LOW (ref 22–31)
CREAT SERPL-MCNC: 0.79 MG/DL — SIGNIFICANT CHANGE UP (ref 0.5–1.3)
EGFR: 78 ML/MIN/1.73M2 — SIGNIFICANT CHANGE UP
GLUCOSE BLDC GLUCOMTR-MCNC: 104 MG/DL — HIGH (ref 70–99)
GLUCOSE BLDC GLUCOMTR-MCNC: 109 MG/DL — HIGH (ref 70–99)
GLUCOSE BLDC GLUCOMTR-MCNC: 122 MG/DL — HIGH (ref 70–99)
GLUCOSE BLDC GLUCOMTR-MCNC: 123 MG/DL — HIGH (ref 70–99)
GLUCOSE BLDC GLUCOMTR-MCNC: 162 MG/DL — HIGH (ref 70–99)
GLUCOSE SERPL-MCNC: 125 MG/DL — HIGH (ref 70–99)
HCT VFR BLD CALC: 41.9 % — SIGNIFICANT CHANGE UP (ref 34.5–45)
HGB BLD-MCNC: 13.8 G/DL — SIGNIFICANT CHANGE UP (ref 11.5–15.5)
MAGNESIUM SERPL-MCNC: 2.2 MG/DL — SIGNIFICANT CHANGE UP (ref 1.6–2.6)
MCHC RBC-ENTMCNC: 28 PG — SIGNIFICANT CHANGE UP (ref 27–34)
MCHC RBC-ENTMCNC: 32.9 GM/DL — SIGNIFICANT CHANGE UP (ref 32–36)
MCV RBC AUTO: 85 FL — SIGNIFICANT CHANGE UP (ref 80–100)
NRBC # BLD: 0 /100 WBCS — SIGNIFICANT CHANGE UP (ref 0–0)
NRBC # FLD: 0 K/UL — SIGNIFICANT CHANGE UP (ref 0–0)
PHOSPHATE SERPL-MCNC: 3.7 MG/DL — SIGNIFICANT CHANGE UP (ref 2.5–4.5)
PLATELET # BLD AUTO: 192 K/UL — SIGNIFICANT CHANGE UP (ref 150–400)
POTASSIUM SERPL-MCNC: 4 MMOL/L — SIGNIFICANT CHANGE UP (ref 3.5–5.3)
POTASSIUM SERPL-SCNC: 4 MMOL/L — SIGNIFICANT CHANGE UP (ref 3.5–5.3)
RBC # BLD: 4.93 M/UL — SIGNIFICANT CHANGE UP (ref 3.8–5.2)
RBC # FLD: 14.6 % — HIGH (ref 10.3–14.5)
SODIUM SERPL-SCNC: 138 MMOL/L — SIGNIFICANT CHANGE UP (ref 135–145)
TSH SERPL-MCNC: 2.82 UIU/ML — SIGNIFICANT CHANGE UP (ref 0.27–4.2)
WBC # BLD: 7.1 K/UL — SIGNIFICANT CHANGE UP (ref 3.8–10.5)
WBC # FLD AUTO: 7.1 K/UL — SIGNIFICANT CHANGE UP (ref 3.8–10.5)

## 2024-09-17 PROCEDURE — 99232 SBSQ HOSP IP/OBS MODERATE 35: CPT

## 2024-09-17 RX ORDER — ACETAMINOPHEN 325 MG/1
650 TABLET ORAL ONCE
Refills: 0 | Status: COMPLETED | OUTPATIENT
Start: 2024-09-17 | End: 2024-09-17

## 2024-09-17 RX ORDER — ACETAMINOPHEN 325 MG/1
650 TABLET ORAL EVERY 6 HOURS
Refills: 0 | Status: DISCONTINUED | OUTPATIENT
Start: 2024-09-17 | End: 2024-09-18

## 2024-09-17 RX ORDER — FLU VACCINE TS 2012-2013(5YR+) 45MCG/.5ML
0.5 VIAL (ML) INTRAMUSCULAR ONCE
Refills: 0 | Status: DISCONTINUED | OUTPATIENT
Start: 2024-09-17 | End: 2024-09-18

## 2024-09-17 RX ORDER — GUAIFENESIN 100 MG/5ML
100 LIQUID ORAL EVERY 6 HOURS
Refills: 0 | Status: DISCONTINUED | OUTPATIENT
Start: 2024-09-17 | End: 2024-09-18

## 2024-09-17 RX ORDER — LIDOCAINE/BENZALKONIUM/ALCOHOL
1 SOLUTION, NON-ORAL TOPICAL DAILY
Refills: 0 | Status: DISCONTINUED | OUTPATIENT
Start: 2024-09-17 | End: 2024-09-18

## 2024-09-17 RX ORDER — BENZONATATE 100 MG
100 CAPSULE ORAL THREE TIMES A DAY
Refills: 0 | Status: DISCONTINUED | OUTPATIENT
Start: 2024-09-17 | End: 2024-09-18

## 2024-09-17 RX ADMIN — Medication 1 PATCH: at 19:27

## 2024-09-17 RX ADMIN — GUAIFENESIN 100 MILLIGRAM(S): 100 LIQUID ORAL at 12:51

## 2024-09-17 RX ADMIN — Medication 1 PATCH: at 12:49

## 2024-09-17 RX ADMIN — GUAIFENESIN 100 MILLIGRAM(S): 100 LIQUID ORAL at 19:59

## 2024-09-17 RX ADMIN — ENOXAPARIN SODIUM 40 MILLIGRAM(S): 100 INJECTION SUBCUTANEOUS at 12:48

## 2024-09-17 RX ADMIN — Medication 81 MILLIGRAM(S): at 12:47

## 2024-09-17 RX ADMIN — Medication 75 MILLIGRAM(S): at 12:47

## 2024-09-17 RX ADMIN — Medication 100 MILLIGRAM(S): at 22:36

## 2024-09-17 RX ADMIN — ACETAMINOPHEN 650 MILLIGRAM(S): 325 TABLET ORAL at 19:27

## 2024-09-17 RX ADMIN — ACETAMINOPHEN 650 MILLIGRAM(S): 325 TABLET ORAL at 12:47

## 2024-09-17 RX ADMIN — Medication 25 MICROGRAM(S): at 05:54

## 2024-09-17 RX ADMIN — Medication 40 MILLIGRAM(S): at 05:54

## 2024-09-17 RX ADMIN — ROSUVASTATIN CALCIUM 5 MILLIGRAM(S): 10 TABLET ORAL at 22:36

## 2024-09-17 NOTE — PROGRESS NOTE ADULT - PROBLEM SELECTOR PLAN 5
Problem: Patient Care Overview  Goal: Plan of Care Review  VSS. Pt up and ambulating and voiding without difficulty. Patients pain is controlled with oral pain medications. Patient is bonding well with infant.        BPs soft (70s on admission now 100s), mentating  - likely due to nitro patch (now off) + nitro spray taken at home  - holding home meds - amlodipine, Toprol, Isosorbide, Entresto, Lasix, Spironolactone   - gradually resume home meds as BP allows  - monitor VS [id number] No

## 2024-09-17 NOTE — PROGRESS NOTE ADULT - SUBJECTIVE AND OBJECTIVE BOX
Patient seen and examined at bedside.    Overnight Events:   JAXON  No longer having CP and feels well     REVIEW OF SYSTEMS:  All other review of systems is negative unless indicated above.            Current Meds:  aspirin enteric coated 81 milliGRAM(s) Oral daily  clopidogrel Tablet 75 milliGRAM(s) Oral daily  dextrose 50% Injectable 25 Gram(s) IV Push once  enoxaparin Injectable 40 milliGRAM(s) SubCutaneous every 24 hours  influenza  Vaccine (HIGH DOSE) 0.5 milliLiter(s) IntraMuscular once  insulin lispro (ADMELOG) corrective regimen sliding scale   SubCutaneous three times a day before meals  insulin lispro (ADMELOG) corrective regimen sliding scale   SubCutaneous at bedtime  levothyroxine 25 MICROGram(s) Oral daily  pantoprazole    Tablet 40 milliGRAM(s) Oral before breakfast  rosuvastatin 5 milliGRAM(s) Oral at bedtime      Vitals:  T(F): 98.2 (09-17), Max: 98.7 (09-16)  HR: 88 (09-17) (70 - 90)  BP: 119/60 (09-17) (97/56 - 129/64)  RR: 17 (09-17)  SpO2: 100% (09-17)  I&O's Summary      Physical Exam:  Appearance: No acute distress; well appearing  Eyes:  EOMI  HEENT: Normal oral mucosa  Cardiovascular: RRR, S1, S2, no murmurs, rubs, or gallops; no edema; no JVD, pain over site of ICD  Respiratory: Clear to auscultation bilaterally  Gastrointestinal: soft, non-tender, non-distended  Musculoskeletal: No clubbing;  Neurologic: Non-focal  Psychiatry: AAOx3, mood & affect appropriate                          13.8   7.10  )-----------( 192      ( 17 Sep 2024 06:52 )             41.9     09-17    138  |  103  |  13  ----------------------------<  125[H]  4.0   |  21[L]  |  0.79    Ca    9.0      17 Sep 2024 06:52  Phos  3.7     09-17  Mg     2.20     09-17    TPro  8.1  /  Alb  4.2  /  TBili  0.4  /  DBili  x   /  AST  31  /  ALT  23  /  AlkPhos  89  09-16    PT/INR - ( 16 Sep 2024 02:19 )   PT: 10.4 sec;   INR: 0.92 ratio         PTT - ( 16 Sep 2024 02:19 )  PTT:28.2 sec  CARDIAC MARKERS ( 16 Sep 2024 12:02 )  x     / x     / x     / x     / 4.6 ng/mL  CARDIAC MARKERS ( 16 Sep 2024 08:39 )  x     / x     / x     / x     / 3.8 ng/mL  CARDIAC MARKERS ( 16 Sep 2024 04:26 )  x     / x     / x     / x     / 3.5 ng/mL  CARDIAC MARKERS ( 16 Sep 2024 02:19 )  x     / x     / x     / x     / 2.1 ng/mL

## 2024-09-17 NOTE — PROGRESS NOTE ADULT - PROBLEM SELECTOR PLAN 1
currently chest pain free. Patient with reproducible chest pain on exam   - Will order for lidocaine patch and tylenol PRN   - EKG non ischemic, Trops 57 --> 71 --> 73   - CTA C/A/P neg for PE and dissection  - appreciate cards eval - low suspicion for ACS, possibly related to infectious etiology   - EP consulted for ICD interrogation   [ ] f/u  TTE, monitor on telemetry currently chest pain free. Patient with reproducible chest pain on exam   - Will order for lidocaine patch and tylenol PRN   - EKG non ischemic, Trops 57 --> 71 --> 73   - CTA C/A/P neg for PE and dissection  - appreciate cards eval - low suspicion for ACS, possibly related to infectious etiology   - EP consulted for ICD interrogation - On 9/16: No events recorded No reprogramming  [ ] f/u  TTE, monitor on telemetry

## 2024-09-17 NOTE — PROGRESS NOTE ADULT - PROBLEM SELECTOR PLAN 4
euvolemic  - TTE 06/2024 with EF of 37%  - EP consulted for ICD interrogation  - GDMT: holding home Toprol, Isosorbide, Entresto, Lasix, Spironolactone due to soft BPs, resume as BP allows  - monitor vital signs euvolemic  - TTE 06/2024 with EF of 37%  - EP consulted for ICD interrogation -ON 9/16 No events recorded No reprogramming  - GDMT: holding home Toprol, Isosorbide, Entresto, Lasix, Spironolactone due to soft BPs, resume as BP allows  - monitor vital signs

## 2024-09-17 NOTE — PROGRESS NOTE ADULT - ASSESSMENT
74 year old female with PMH of severe CAD with MI s/p CABG x3 (LIMA-LAD, SVG-OM1, SVG-RCA in 2008 at [facility], multiple PCI with total 16 stents, recent MI in 4 /2024 s/p LHC (no PCI) at [facility], chronic HFrEF, history of VT s/p ICD (single chamber ST Wisam by Dr. Johnson initial implant in 2012 complicated by lead malfunction and reimplant in 02/2013), recent ICD pocket revision and generator change (6/2024) HTN, HLD, DMT2 presenting to ED due to chest pain over ICD site in additional to URI symptoms. She has known extensive CAD that is not amenable to revasc. Her mildly elevated trop likely represents myocardial injury in the s/o pain and URI, and her pain is likely related to her ICD incision and not cardiac CP.     Recs  - Continue home GDMT/anti-anginals  - ICD interrogated and functioning well  - Cont home anti-platelets   - No further cardiac testing indicated at this time      Please see attending attestation for final recommendations        Albert Anderson MD  Cardiology Fellow     All Cardiology service information can be found 24/7 on [email or web address], password: [email or web address]
74F with hx of severe CAD c/b MI s/p CABG x3 and PCI with total 16 stents, recent MI (4/2024 s/p LHC at [facility]), HFrEF (EF 37% 06/2024) s/p [employer] Wisam ICD (initial implant in 2012 complicated by lead malfunction, reimplant in 02/2013,  ICD pocket revision and generator change 6/2024), HTN, HLD, T2DM, Hypothyroidism who presents with chest pain and URI sx, r/o'd for ACS, found to be hypotensive likely d/t nitro patch, and entero-rhinovirus positive.

## 2024-09-17 NOTE — PROGRESS NOTE ADULT - PROBLEM SELECTOR PLAN 2
reports URI sx  - RVP positive for enterovirus, FLU/COVID neg  - supportive care - started on tesslon pearls and Robitussin

## 2024-09-17 NOTE — PROGRESS NOTE ADULT - SUBJECTIVE AND OBJECTIVE BOX
[facility]  Division of Hospital Medicine  Janet Auguste MD  Pager: [phone]      Patient is a 74y old  Female who presents with a chief complaint of     SUBJECTIVE / OVERNIGHT EVENTS: Patient awaiting TTE   ADDITIONAL REVIEW OF SYSTEMS:    MEDICATIONS  (STANDING):  aspirin enteric coated 81 milliGRAM(s) Oral daily  clopidogrel Tablet 75 milliGRAM(s) Oral daily  dextrose 50% Injectable 25 Gram(s) IV Push once  enoxaparin Injectable 40 milliGRAM(s) SubCutaneous every 24 hours  influenza  Vaccine (HIGH DOSE) 0.5 milliLiter(s) IntraMuscular once  insulin lispro (ADMELOG) corrective regimen sliding scale   SubCutaneous three times a day before meals  insulin lispro (ADMELOG) corrective regimen sliding scale   SubCutaneous at bedtime  levothyroxine 25 MICROGram(s) Oral daily  pantoprazole    Tablet 40 milliGRAM(s) Oral before breakfast  rosuvastatin 5 milliGRAM(s) Oral at bedtime    MEDICATIONS  (PRN):      CAPILLARY BLOOD GLUCOSE      POCT Blood Glucose.: 109 mg/dL (17 Sep 2024 08:50)  POCT Blood Glucose.: 104 mg/dL (17 Sep 2024 00:41)  POCT Blood Glucose.: 140 mg/dL (16 Sep 2024 22:45)  POCT Blood Glucose.: 123 mg/dL (16 Sep 2024 16:12)  POCT Blood Glucose.: 113 mg/dL (16 Sep 2024 14:24)  POCT Blood Glucose.: 98 mg/dL (16 Sep 2024 11:15)    I&O's Summary      PHYSICAL EXAM:  Vital Signs Last 24 Hrs  T(C): 36.8 (17 Sep 2024 05:08), Max: 37.1 (16 Sep 2024 23:29)  T(F): 98.2 (17 Sep 2024 05:08), Max: 98.7 (16 Sep 2024 23:29)  HR: 88 (17 Sep 2024 05:08) (70 - 90)  BP: 119/60 (17 Sep 2024 05:08) (97/56 - 129/64)  BP(mean): 79 (16 Sep 2024 23:29) (79 - 81)  RR: 17 (17 Sep 2024 05:08) (16 - 18)  SpO2: 100% (17 Sep 2024 05:08) (95% - 100%)    Parameters below as of 17 Sep 2024 05:08  Patient On (Oxygen Delivery Method): room air      CONSTITUTIONAL: NAD, well-developed, well-groomed  EYES: PERRLA; conjunctiva and sclera clear  ENMT: Moist oral mucosa, no pharyngeal injection or exudates; normal dentition  NECK: Supple, no palpable masses; no thyromegaly  RESPIRATORY: Normal respiratory effort; lungs are clear to auscultation bilaterally  CARDIOVASCULAR: Regular rate and rhythm, normal S1 and S2, no murmur/rub/gallop; No lower extremity edema; Peripheral pulses are 2+ bilaterally  ABDOMEN: Nontender to palpation, normoactive bowel sounds, no rebound/guarding; No hepatosplenomegaly  MUSCULOSKELETAL:  Normal gait; no clubbing or cyanosis of digits; no joint swelling or tenderness to palpation  PSYCH: A+O to person, place, and time; affect appropriate  NEUROLOGY: CN 2-12 are intact and symmetric; no gross sensory deficits   SKIN: No rashes; no palpable lesions    LABS:                        13.8   7.10  )-----------( 192      ( 17 Sep 2024 06:52 )             41.9     09-17    138  |  103  |  13  ----------------------------<  125[H]  4.0   |  21[L]  |  0.79    Ca    9.0      17 Sep 2024 06:52  Phos  3.7     09-17  Mg     2.20     09-17    TPro  8.1  /  Alb  4.2  /  TBili  0.4  /  DBili  x   /  AST  31  /  ALT  23  /  AlkPhos  89  09-16    PT/INR - ( 16 Sep 2024 02:19 )   PT: 10.4 sec;   INR: 0.92 ratio         PTT - ( 16 Sep 2024 02:19 )  PTT:28.2 sec  CARDIAC MARKERS ( 16 Sep 2024 12:02 )  x     / x     / x     / x     / 4.6 ng/mL  CARDIAC MARKERS ( 16 Sep 2024 08:39 )  x     / x     / x     / x     / 3.8 ng/mL  CARDIAC MARKERS ( 16 Sep 2024 04:26 )  x     / x     / x     / x     / 3.5 ng/mL  CARDIAC MARKERS ( 16 Sep 2024 02:19 )  x     / x     / x     / x     / 2.1 ng/mL      Urinalysis Basic - ( 17 Sep 2024 06:52 )    Color: x / Appearance: x / SG: x / pH: x  Gluc: 125 mg/dL / Ketone: x  / Bili: x / Urobili: x   Blood: x / Protein: x / Nitrite: x   Leuk Esterase: x / RBC: x / WBC x   Sq Epi: x / Non Sq Epi: x / Bacteria: x          RADIOLOGY & ADDITIONAL TESTS:  Results Reviewed:   Imaging Personally Reviewed:  Electrocardiogram Personally Reviewed:    COORDINATION OF CARE:  Care Discussed with Consultants/Other Providers [Y/N]:  Prior or Outpatient Records Reviewed [Y/N]:   [facility]  Division of Hospital Medicine  Janet Auguste MD  Pager: [phone]      Patient is a 74y old  Female who presents with a chief complaint of     SUBJECTIVE / OVERNIGHT EVENTS: Patient awaiting TTE. Examined at bedside with daughter, who provided translation per patient request. Patient daughter is concerned as this is 3rd hospitalization. Unclear cause of chest pain. Chest discomfort reproducible on exam. Otherwise, reports URI symptoms. Concerned with being discharged too early   ADDITIONAL REVIEW OF SYSTEMS:    MEDICATIONS  (STANDING):  aspirin enteric coated 81 milliGRAM(s) Oral daily  clopidogrel Tablet 75 milliGRAM(s) Oral daily  dextrose 50% Injectable 25 Gram(s) IV Push once  enoxaparin Injectable 40 milliGRAM(s) SubCutaneous every 24 hours  influenza  Vaccine (HIGH DOSE) 0.5 milliLiter(s) IntraMuscular once  insulin lispro (ADMELOG) corrective regimen sliding scale   SubCutaneous three times a day before meals  insulin lispro (ADMELOG) corrective regimen sliding scale   SubCutaneous at bedtime  levothyroxine 25 MICROGram(s) Oral daily  pantoprazole    Tablet 40 milliGRAM(s) Oral before breakfast  rosuvastatin 5 milliGRAM(s) Oral at bedtime    MEDICATIONS  (PRN):      CAPILLARY BLOOD GLUCOSE      POCT Blood Glucose.: 109 mg/dL (17 Sep 2024 08:50)  POCT Blood Glucose.: 104 mg/dL (17 Sep 2024 00:41)  POCT Blood Glucose.: 140 mg/dL (16 Sep 2024 22:45)  POCT Blood Glucose.: 123 mg/dL (16 Sep 2024 16:12)  POCT Blood Glucose.: 113 mg/dL (16 Sep 2024 14:24)  POCT Blood Glucose.: 98 mg/dL (16 Sep 2024 11:15)    I&O's Summary      PHYSICAL EXAM:  Vital Signs Last 24 Hrs  T(C): 36.8 (17 Sep 2024 05:08), Max: 37.1 (16 Sep 2024 23:29)  T(F): 98.2 (17 Sep 2024 05:08), Max: 98.7 (16 Sep 2024 23:29)  HR: 88 (17 Sep 2024 05:08) (70 - 90)  BP: 119/60 (17 Sep 2024 05:08) (97/56 - 129/64)  BP(mean): 79 (16 Sep 2024 23:29) (79 - 81)  RR: 17 (17 Sep 2024 05:08) (16 - 18)  SpO2: 100% (17 Sep 2024 05:08) (95% - 100%)    Parameters below as of 17 Sep 2024 05:08  Patient On (Oxygen Delivery Method): room air      CONSTITUTIONAL: Elderly woman in NAD, well-developed, well-groomed  RESPIRATORY: Normal respiratory effort; lungs are clear to auscultation bilaterally  CARDIOVASCULAR: Regular rate and rhythm, normal S1 and S2, no murmur/rub/gallop; No lower extremity edema; Peripheral pulses are 2+ bilaterally; + CHest pain on left side on palpation   ABDOMEN: Nontender to palpation, normoactive bowel sounds, no rebound/guarding; No hepatosplenomegaly  PSYCH:  affect appropriate  NEUROLOGY: no gross sensory deficits   SKIN: No rashes; no palpable lesions    LABS:                        13.8   7.10  )-----------( 192      ( 17 Sep 2024 06:52 )             41.9     09-17    138  |  103  |  13  ----------------------------<  125[H]  4.0   |  21[L]  |  0.79    Ca    9.0      17 Sep 2024 06:52  Phos  3.7     09-17  Mg     2.20     09-17    TPro  8.1  /  Alb  4.2  /  TBili  0.4  /  DBili  x   /  AST  31  /  ALT  23  /  AlkPhos  89  09-16    PT/INR - ( 16 Sep 2024 02:19 )   PT: 10.4 sec;   INR: 0.92 ratio         PTT - ( 16 Sep 2024 02:19 )  PTT:28.2 sec  CARDIAC MARKERS ( 16 Sep 2024 12:02 )  x     / x     / x     / x     / 4.6 ng/mL  CARDIAC MARKERS ( 16 Sep 2024 08:39 )  x     / x     / x     / x     / 3.8 ng/mL  CARDIAC MARKERS ( 16 Sep 2024 04:26 )  x     / x     / x     / x     / 3.5 ng/mL  CARDIAC MARKERS ( 16 Sep 2024 02:19 )  x     / x     / x     / x     / 2.1 ng/mL      Urinalysis Basic - ( 17 Sep 2024 06:52 )    Color: x / Appearance: x / SG: x / pH: x  Gluc: 125 mg/dL / Ketone: x  / Bili: x / Urobili: x   Blood: x / Protein: x / Nitrite: x   Leuk Esterase: x / RBC: x / WBC x   Sq Epi: x / Non Sq Epi: x / Bacteria: x          RADIOLOGY & ADDITIONAL TESTS:  Results Reviewed: < from: US Kidney and Bladder (09.16.24 @ 12:54) >  Solid right renal mass measuring up to 3.3 cm. Findings may further  evaluated with CT scan with and without contrast utilizing a dedicated   renal mass protocol.    Right upper pole renal cyst measuring 1.0 cm, corresponding with   hyperdensity seen on prior CT.    < end of copied text >    Imaging Personally Reviewed:  Electrocardiogram Personally Reviewed:    COORDINATION OF CARE:  Care Discussed with Consultants/Other Providers [Y/N]:  Prior or Outpatient Records Reviewed [Y/N]:

## 2024-09-17 NOTE — PROGRESS NOTE ADULT - PROBLEM SELECTOR PLAN 8
incidental finding  - CTAP with hypodensity right kidney  - follow up renal sonogram - Solid right renal mass measuring up to 3.3 cm. Findings may further  evaluated with CT scan with and without contrast utilizing a dedicated   renal mass protocol.    - Patient daughter reports following up with urologist/ oncologist who has been monitoring size of mass

## 2024-09-17 NOTE — PROGRESS NOTE ADULT - TIME BILLING
Review of laboratory data, radiology results, consultants' recommendations, documentation in [place], discussion with patient/house staff/ACP and interdisciplinary staff (such as , social workers, etc). Interventions were performed as documented above.

## 2024-09-17 NOTE — PROGRESS NOTE ADULT - PROBLEM SELECTOR PLAN 3
hx of severe CAD c/b MI s/p CABG x3 and PCI with total 16 stents, recent MI (4/2024 s/p LHC at [facility])  - c/w home aspirin, plavix, rosuvastatin  - holding home Toprol, Isosorbide due to soft BPs, resume as BP allows  - monitor vital signs

## 2024-09-18 ENCOUNTER — APPOINTMENT (OUTPATIENT)
Dept: ELECTROPHYSIOLOGY | Facility: CLINIC | Age: 74
End: 2024-09-18

## 2024-09-18 ENCOUNTER — TRANSCRIPTION ENCOUNTER (OUTPATIENT)
Age: 74
End: 2024-09-18

## 2024-09-18 VITALS
RESPIRATION RATE: 18 BRPM | OXYGEN SATURATION: 100 % | SYSTOLIC BLOOD PRESSURE: 115 MMHG | DIASTOLIC BLOOD PRESSURE: 60 MMHG | HEART RATE: 81 BPM | TEMPERATURE: 98 F

## 2024-09-18 LAB
ANION GAP SERPL CALC-SCNC: 14 MMOL/L — SIGNIFICANT CHANGE UP (ref 7–14)
BUN SERPL-MCNC: 11 MG/DL — SIGNIFICANT CHANGE UP (ref 7–23)
CALCIUM SERPL-MCNC: 9 MG/DL — SIGNIFICANT CHANGE UP (ref 8.4–10.5)
CHLORIDE SERPL-SCNC: 103 MMOL/L — SIGNIFICANT CHANGE UP (ref 98–107)
CO2 SERPL-SCNC: 24 MMOL/L — SIGNIFICANT CHANGE UP (ref 22–31)
CREAT SERPL-MCNC: 0.74 MG/DL — SIGNIFICANT CHANGE UP (ref 0.5–1.3)
EGFR: 85 ML/MIN/1.73M2 — SIGNIFICANT CHANGE UP
GLUCOSE BLDC GLUCOMTR-MCNC: 119 MG/DL — HIGH (ref 70–99)
GLUCOSE BLDC GLUCOMTR-MCNC: 123 MG/DL — HIGH (ref 70–99)
GLUCOSE SERPL-MCNC: 116 MG/DL — HIGH (ref 70–99)
HCT VFR BLD CALC: 42.4 % — SIGNIFICANT CHANGE UP (ref 34.5–45)
HGB BLD-MCNC: 13.9 G/DL — SIGNIFICANT CHANGE UP (ref 11.5–15.5)
MAGNESIUM SERPL-MCNC: 2.3 MG/DL — SIGNIFICANT CHANGE UP (ref 1.6–2.6)
MCHC RBC-ENTMCNC: 27.6 PG — SIGNIFICANT CHANGE UP (ref 27–34)
MCHC RBC-ENTMCNC: 32.8 GM/DL — SIGNIFICANT CHANGE UP (ref 32–36)
MCV RBC AUTO: 84.3 FL — SIGNIFICANT CHANGE UP (ref 80–100)
NRBC # BLD: 0 /100 WBCS — SIGNIFICANT CHANGE UP (ref 0–0)
NRBC # FLD: 0 K/UL — SIGNIFICANT CHANGE UP (ref 0–0)
PHOSPHATE SERPL-MCNC: 3.8 MG/DL — SIGNIFICANT CHANGE UP (ref 2.5–4.5)
PLATELET # BLD AUTO: 228 K/UL — SIGNIFICANT CHANGE UP (ref 150–400)
POTASSIUM SERPL-MCNC: 4.4 MMOL/L — SIGNIFICANT CHANGE UP (ref 3.5–5.3)
POTASSIUM SERPL-SCNC: 4.4 MMOL/L — SIGNIFICANT CHANGE UP (ref 3.5–5.3)
RBC # BLD: 5.03 M/UL — SIGNIFICANT CHANGE UP (ref 3.8–5.2)
RBC # FLD: 14.5 % — SIGNIFICANT CHANGE UP (ref 10.3–14.5)
SODIUM SERPL-SCNC: 141 MMOL/L — SIGNIFICANT CHANGE UP (ref 135–145)
WBC # BLD: 6.48 K/UL — SIGNIFICANT CHANGE UP (ref 3.8–10.5)
WBC # FLD AUTO: 6.48 K/UL — SIGNIFICANT CHANGE UP (ref 3.8–10.5)

## 2024-09-18 PROCEDURE — 99238 HOSP IP/OBS DSCHRG MGMT 30/<: CPT

## 2024-09-18 RX ORDER — AMLODIPINE BESYLATE 10 MG/1
1 TABLET ORAL
Qty: 30 | Refills: 2
Start: 2024-09-18 | End: 2024-12-16

## 2024-09-18 RX ORDER — METOPROLOL TARTRATE 100 MG/1
1 TABLET ORAL
Qty: 30 | Refills: 0
Start: 2024-09-18 | End: 2024-10-17

## 2024-09-18 RX ORDER — LIDOCAINE/BENZALKONIUM/ALCOHOL
1 SOLUTION, NON-ORAL TOPICAL
Qty: 1 | Refills: 0
Start: 2024-09-18 | End: 2024-10-17

## 2024-09-18 RX ORDER — SACUBITRIL AND VALSARTAN 49; 51 MG/1; MG/1
1 TABLET, FILM COATED ORAL
Qty: 60 | Refills: 0
Start: 2024-09-18 | End: 2024-10-17

## 2024-09-18 RX ORDER — ISOSORBIDE MONONITRATE 30 MG/1
1 TABLET, EXTENDED RELEASE ORAL
Qty: 30 | Refills: 0
Start: 2024-09-18 | End: 2024-10-17

## 2024-09-18 RX ORDER — FLUOXETINE HCL 20 MG/5 ML
1 SOLUTION, ORAL ORAL
Refills: 0 | DISCHARGE

## 2024-09-18 RX ORDER — FUROSEMIDE 40 MG
1 TABLET ORAL
Qty: 30 | Refills: 0
Start: 2024-09-18 | End: 2024-10-17

## 2024-09-18 RX ORDER — BENZONATATE 100 MG
1 CAPSULE ORAL
Qty: 87 | Refills: 0
Start: 2024-09-18 | End: 2024-10-16

## 2024-09-18 RX ORDER — SPIRONOLACTONE 25 MG/1
1 TABLET, FILM COATED ORAL
Qty: 30 | Refills: 0
Start: 2024-09-18 | End: 2024-10-17

## 2024-09-18 RX ORDER — GUAIFENESIN 100 MG/5ML
5 LIQUID ORAL
Qty: 600 | Refills: 0
Start: 2024-09-18 | End: 2024-10-17

## 2024-09-18 RX ADMIN — Medication 100 MILLIGRAM(S): at 06:13

## 2024-09-18 RX ADMIN — Medication 75 MILLIGRAM(S): at 11:13

## 2024-09-18 RX ADMIN — ACETAMINOPHEN 650 MILLIGRAM(S): 325 TABLET ORAL at 01:00

## 2024-09-18 RX ADMIN — Medication 25 MICROGRAM(S): at 05:15

## 2024-09-18 RX ADMIN — Medication 40 MILLIGRAM(S): at 06:12

## 2024-09-18 RX ADMIN — Medication 100 MILLIGRAM(S): at 13:45

## 2024-09-18 RX ADMIN — Medication 1 PATCH: at 11:14

## 2024-09-18 RX ADMIN — Medication 1 PATCH: at 00:49

## 2024-09-18 RX ADMIN — ACETAMINOPHEN 650 MILLIGRAM(S): 325 TABLET ORAL at 00:30

## 2024-09-18 RX ADMIN — Medication 81 MILLIGRAM(S): at 11:13

## 2024-09-18 RX ADMIN — GUAIFENESIN 100 MILLIGRAM(S): 100 LIQUID ORAL at 09:27

## 2024-09-18 RX ADMIN — GUAIFENESIN 100 MILLIGRAM(S): 100 LIQUID ORAL at 15:02

## 2024-09-18 RX ADMIN — ENOXAPARIN SODIUM 40 MILLIGRAM(S): 100 INJECTION SUBCUTANEOUS at 11:13

## 2024-09-18 NOTE — DISCHARGE NOTE PROVIDER - NSDCFUSCHEDAPPT_GEN_ALL_CORE_FT
[facility] Physician [facility] [phone]  Scheduled Appointment: 10/03/2024

## 2024-09-18 NOTE — DISCHARGE NOTE PROVIDER - HOSPITAL COURSE
HPI:  74F with hx of severe CAD c/b MI s/p CABG x3 and PCI with total 16 stents, recent MI (4/2024 s/p C at [facility]), HFrEF (EF 37% 06/2024) s/p [employer] ICD (initial implant in 2012 complicated by lead malfunction, reimplant in 02/2013,  ICD pocket revision and generator change 6/2024), HTN, HLD, T2DM, Hypothyroidism who presents with chest pain and URI sx. Patient reports sudden onset left sided chest pain over ICD site associated with some SOB. Patient tried taking nitro patch x1, nitro spray x1, asa x2 with minimal relief. She also notes URI sx for past few days. Denies fevers, chills, abdominal pain, n/v/d, dysuria, sick contacts, recent travel.       Hospital Course:    Assessment and Plan:   • Assessment	  74F with hx of severe CAD c/b MI s/p CABG x3 and PCI with total 16 stents, recent MI (4/2024 s/p C at [facility]), HFrEF (EF 37% 06/2024) s/p [employer] ICD (initial implant in 2012 complicated by lead malfunction, reimplant in 02/2013,  ICD pocket revision and generator change 6/2024), HTN, HLD, T2DM, Hypothyroidism who presents with chest pain and URI sx, r/o'd for ACS, found to be hypotensive likely d/t nitro patch, and entero-rhinovirus positive.        Problem/Plan - 1:  •? Problem: Chest pain.   •? Plan: currently chest pain free. Patient with reproducible chest pain on exam   - Will order for lidocaine patch and tylenol PRN   - EKG non ischemic, Trops 57 --> 71 --> 73   - CTA C/A/P neg for PE and dissection  - appreciate cardio eval - low suspicion for ACS, continue home GDMT/anti-anginals, ICD interrogated and functioning well, Cont home anti-platelets; No further cardiac testing indicated at this time    - EP consulted for ICD interrogation - On 9/16: No events recorded, No reprogramming  - TTE reviewed, monitor on telemetry     Problem/Plan - 2:  •? Problem: Enterovirus infection.   •? Plan: reports URI sx  - RVP positive for enterovirus, FLU/COVID neg  - supportive care - started on tesslon pearls and Robitussin.     Problem/Plan - 3:  •? Problem: CAD (coronary artery disease).   •? Plan: hx of severe CAD c/b MI s/p CABG x3 and PCI with total 16 stents, recent MI (4/2024 s/p LHC at [facility])  - c/w home aspirin, plavix, rosuvastatin  - held Toprol, Isosorbide on admission due to soft BPs, can resume now as pt is normotensive  - monitor vital signs.     Problem/Plan - 4:  •? Problem: Chronic HFrEF (heart failure with reduced ejection fraction).   •? Plan: euvolemic  - TTE 06/2024 with EF of 37%  - EP consulted for ICD interrogation -ON 9/16 No events recorded No reprogramming  - GDMT: held home Toprol, Isosorbide, Entresto, Lasix, Spironolactone due to soft BPs, can resume now as pt is normotensive  - monitor vital signs.     Problem/Plan - 5:  •? Problem: Hypertension.   •? Plan: BPs soft (70s on admission, now 131/79)  - likely due to nitro patch (now off) + nitro spray taken at home  - held - amlodipine, Toprol, Isosorbide, Entresto, Lasix, Spironolactone   - resumed home meds on discharge  - monitor VS [id number].     Problem/Plan - 6:  •? Problem: T2DM (type 2 diabetes mellitus).   •? Plan: A1C 7%  - hold home oral agents while admitted  - FS/SSI qac and hs.     Problem/Plan - 7:  •? Problem: Hypothyroidism.   •? Plan:   - c/w levothyroxine  - CT chest with incidental thyroid lesion noted - outpatient thyroid sonogram.     Problem/Plan - 8:  •? Problem: Renal lesion.   •? Plan: incidental finding  - CTAP with hypodensity right kidney  - follow up renal sonogram - Solid right renal mass measuring up to 3.3 cm. Findings may further  evaluated with CT scan with and without contrast utilizing a dedicated   renal mass protocol.    - Patient daughter reports following up with urologist/ oncologist who has been monitoring size of mass.     Problem/Plan - 9:  •? Problem: Prophylactic measure.   •? Plan; DVT ppx: lovenox  DIET: DASH + CC    Active or Pending Issues Requiring Follow-up: F/u PCP, Cardiology, and EP on discharge    Advanced Directives:   [x] Full code  [ ] DNR  [ ] Hospice    Discharge Diagnoses:  NSTEMI likely 2/2 URI (chest pain likely related to her ICD incision and not cardiac CP, now resolved) HPI:  74F with hx of severe CAD c/b MI s/p CABG x3 and PCI with total 16 stents, recent MI (4/2024 s/p C at [facility]), HFrEF (EF 37% 06/2024) s/p [employer] ICD (initial implant in 2012 complicated by lead malfunction, reimplant in 02/2013,  ICD pocket revision and generator change 6/2024), HTN, HLD, T2DM, Hypothyroidism who presents with chest pain and URI sx. Patient reports sudden onset left sided chest pain over ICD site associated with some SOB. Patient tried taking nitro patch x1, nitro spray x1, asa x2 with minimal relief. She also notes URI sx for past few days. Denies fevers, chills, abdominal pain, n/v/d, dysuria, sick contacts, recent travel.       Hospital Course:    Assessment and Plan:   • Assessment	  74F with hx of severe CAD c/b MI s/p CABG x3 and PCI with total 16 stents, recent MI (4/2024 s/p C at [facility]), HFrEF (EF 37% 06/2024) s/p [employer] ICD (initial implant in 2012 complicated by lead malfunction, reimplant in 02/2013,  ICD pocket revision and generator change 6/2024), HTN, HLD, T2DM, Hypothyroidism who presents with chest pain and URI sx, r/o'd for ACS, found to be hypotensive likely d/t nitro patch, and entero-rhinovirus positive.        Problem/Plan - 1:  •? Problem: Chest pain.   •? Plan: currently chest pain free. Patient with reproducible chest pain on exam   - Will order for lidocaine patch and tylenol PRN   - EKG non ischemic, Trops 57 --> 71 --> 73   - CTA C/A/P neg for PE and dissection  - appreciate cardio eval - low suspicion for ACS, continue home GDMT/anti-anginals, ICD interrogated and functioning well, Cont home anti-platelets; No further cardiac testing indicated at this time    - EP consulted for ICD interrogation - On 9/16: No events recorded, No reprogramming  - TTE reviewed, monitor on telemetry     Problem/Plan - 2:  •? Problem: Enterovirus infection.   •? Plan: reports URI sx  - RVP positive for enterovirus, FLU/COVID neg  - supportive care - started on tesslon pearls and Robitussin.     Problem/Plan - 3:  •? Problem: CAD (coronary artery disease).   •? Plan: hx of severe CAD c/b MI s/p CABG x3 and PCI with total 16 stents, recent MI (4/2024 s/p LHC at [facility])  - c/w home aspirin, plavix, rosuvastatin  - held Toprol, Isosorbide on admission due to soft BPs, can resume now as pt is normotensive  - monitor vital signs.     Problem/Plan - 4:  •? Problem: Chronic HFrEF (heart failure with reduced ejection fraction).   •? Plan: euvolemic  - TTE 06/2024 with EF of 37%  - EP consulted for ICD interrogation -ON 9/16 No events recorded No reprogramming  - GDMT: held home Toprol, Isosorbide, Entresto, Lasix, Spironolactone due to soft BPs, can resume now as pt is normotensive  - monitor vital signs.     Problem/Plan - 5:  •? Problem: Hypertension.   •? Plan: BPs soft (70s on admission, now 131/79)  - likely due to nitro patch (now off) + nitro spray taken at home  - held - amlodipine, Toprol, Isosorbide, Entresto, Lasix, Spironolactone   - resumed home meds on discharge  - monitor VS [id number].     Problem/Plan - 6:  •? Problem: T2DM (type 2 diabetes mellitus).   •? Plan: A1C 7%  - hold home oral agents while admitted  - FS/SSI qac and hs.     Problem/Plan - 7:  •? Problem: Hypothyroidism.   •? Plan:   - c/w levothyroxine  - CT chest with incidental thyroid lesion noted - outpatient thyroid sonogram.     Problem/Plan - 8:  •? Problem: Renal lesion.   •? Plan: incidental finding  - CTAP with hypodensity right kidney  - follow up renal sonogram - Solid right renal mass measuring up to 3.3 cm. Findings may further  evaluated with CT scan with and without contrast utilizing a dedicated   renal mass protocol.    - Patient daughter reports following up with urologist/ oncologist who has been monitoring size of mass.     Problem/Plan - 9:  •? Problem: Prophylactic measure.   •? Plan; DVT ppx: lovenox  DIET: DASH + CC    Active or Pending Issues Requiring Follow-up: F/u PCP, Cardiology, and EP on discharge    Advanced Directives:   [x] Full code  [ ] DNR  [ ] Hospice    Discharge Diagnoses:  NSTEMI likely i/s/o URI (chest pain likely related to her ICD incision and not cardiac CP, now resolved)

## 2024-09-18 NOTE — DISCHARGE NOTE NURSING/CASE MANAGEMENT/SOCIAL WORK - NSDCVIVACCINE_GEN_ALL_CORE_FT
influenza, high-dose, quadrivalent; 16-Sep-2020 15:18; Asia Gutiérrez (RN); [employer]; [id number] (Exp. Date: 30-Jun-2021); IntraMuscular; Deltoid Right.; 0.7 milliLiter(s); VIS (VIS Published: 15-Aug-2019, VIS Presented: 16-Sep-2020);

## 2024-09-18 NOTE — DISCHARGE NOTE PROVIDER - NSFOLLOWUPCLINICS_GEN_ALL_ED_FT
Cardiology at [facility]  Cardiology  [address] [place] [id number]  Phone: [phone]  Fax:   Follow Up Time: 2 weeks

## 2024-09-18 NOTE — DISCHARGE NOTE NURSING/CASE MANAGEMENT/SOCIAL WORK - PATIENT PORTAL LINK FT
You can access the FollowMyHealth Patient Portal offered by [facility] by registering at the following website: [email or web address]. By joining [employer]’s FollowMyHealth portal, you will also be able to view your health information using other applications (apps) compatible with our system.

## 2024-09-18 NOTE — DISCHARGE NOTE PROVIDER - ATTENDING DISCHARGE PHYSICAL EXAMINATION:
Vital Signs Last 24 Hrs  T(C): 36.4 (18 Sep 2024 09:30), Max: 37.1 (17 Sep 2024 18:53)  T(F): 97.6 (18 Sep 2024 09:30), Max: 98.7 (17 Sep 2024 18:53)  HR: 85 (18 Sep 2024 09:30) (74 - 85)  BP: 131/79 (18 Sep 2024 09:30) (115/61 - 137/81)  BP(mean): 92 (18 Sep 2024 09:30) (92 - 92)  RR: 16 (18 Sep 2024 09:30) (16 - 18)  SpO2: 99% (18 Sep 2024 09:30) (99% - 100%)    Parameters below as of 18 Sep 2024 09:30  Patient On (Oxygen Delivery Method): room air    PHYSICAL EXAM:  GENERAL: NAD, well-developed  HEAD:  Atraumatic, Normocephalic  EYES: EOMI, PERRLA, conjunctiva and sclera clear  NECK: Supple, No JVD  CHEST/LUNG: Clear to auscultation bilaterally; No wheeze, rhonchi, or rales  HEART: Regular rate and rhythm; No murmurs, rubs, or gallops  ABDOMEN: Soft, Nontender, Nondistended; Bowel sounds present  EXTREMITIES:  2+ Peripheral Pulses, No clubbing, cyanosis, or edema  NEUROLOGY: AAOx3, no focal neurological deficits   SKIN: No rashes or lesions observed

## 2024-09-18 NOTE — DISCHARGE NOTE NURSING/CASE MANAGEMENT/SOCIAL WORK - NSDCPEFALRISK_GEN_ALL_CORE
For information on Fall & Injury Prevention, visit: [email or web address] OR  [email or web address] OR  https://www.cdc.gov/steadi/patient.html

## 2024-09-18 NOTE — PROGRESS NOTE ADULT - SUBJECTIVE AND OBJECTIVE BOX
Patient was seen and examined today.     Please see discharge note for full discharge details.     Patient is clinically stable to be discharged home today.

## 2024-09-18 NOTE — DISCHARGE NOTE PROVIDER - NSDCCPCAREPLAN_GEN_ALL_CORE_FT
PRINCIPAL DISCHARGE DIAGNOSIS  Diagnosis: Chest pain  Assessment and Plan of Treatment: Hi, you presented with chest pain likely due to incision at the ICD site and in the setting of upper respiratory tract infection. ICD was interrogated by EP and is functioning appropriately. Cardiology recommended to continue home medications and no further cardiac testing is indicated at this time. Pleas follow-up with your PCP, Cardiologist, and EP on discharge.

## 2024-09-18 NOTE — DISCHARGE NOTE PROVIDER - NSDCMRMEDTOKEN_GEN_ALL_CORE_FT
amLODIPine 10 mg oral tablet: 1 tab(s) orally once a day   aspirin 81 mg oral delayed release tablet: 1 tab(s) orally once a day  clopidogrel 75 mg oral tablet: 1 tab(s) orally once a day  Entresto 24 mg-26 mg oral tablet: 1 tab(s) orally 2 times a day  Farxiga 5 mg oral tablet: 1 tab(s) orally once a day  FLUoxetine 10 mg oral tablet: 1 tab(s) orally once a day  isosorbide mononitrate 30 mg oral tablet, extended release: 1 tab(s) orally once a day  Lasix 40 mg oral tablet: 1 tab(s) orally once a day  levothyroxine 25 mcg (0.025 mg) oral tablet: 1 tab(s) orally once a day  metoprolol succinate 100 mg oral tablet, extended release: 1 tab(s) orally once a day  Mounjaro 7.5 mg/0.5 mL subcutaneous solution: 7.5 milligram(s) subcutaneously once a week once a week on Thursday  pantoprazole 40 mg oral delayed release tablet: 1 tab(s) orally once a day  rosuvastatin 5 mg oral tablet: 1 tab(s) orally once a day  spironolactone 25 mg oral tablet: 1 tab(s) orally once a day  Xiidra 5% ophthalmic solution: 1 drop(s) in each eye 2 times a day as needed for  dry eyes   amLODIPine 10 mg oral tablet: 1 tab(s) orally once a day  aspirin 81 mg oral delayed release tablet: 1 tab(s) orally once a day  benzonatate 100 mg oral capsule: 1 cap(s) orally 3 times a day  clopidogrel 75 mg oral tablet: 1 tab(s) orally once a day  Entresto 24 mg-26 mg oral tablet: 1 tab(s) orally 2 times a day  Farxiga 5 mg oral tablet: 1 tab(s) orally once a day  guaiFENesin 100 mg/5 mL oral liquid: 5 milliliter(s) orally every 6 hours as needed for Cough  isosorbide mononitrate 30 mg oral tablet, extended release: 1 tab(s) orally once a day  Lasix 40 mg oral tablet: 1 tab(s) orally once a day  levothyroxine 25 mcg (0.025 mg) oral tablet: 1 tab(s) orally once a day  lidocaine 4% topical film: Apply topically to affected area once a day as needed for  chest pain  metoprolol succinate 100 mg oral tablet, extended release: 1 tab(s) orally once a day  Mounjaro 7.5 mg/0.5 mL subcutaneous solution: 7.5 milligram(s) subcutaneously once a week once a week on Thursday  pantoprazole 40 mg oral delayed release tablet: 1 tab(s) orally once a day  rosuvastatin 5 mg oral tablet: 1 tab(s) orally once a day  spironolactone 25 mg oral tablet: 1 tab(s) orally once a day

## 2024-10-03 ENCOUNTER — NON-APPOINTMENT (OUTPATIENT)
Age: 74
End: 2024-10-03

## 2024-10-03 ENCOUNTER — APPOINTMENT (OUTPATIENT)
Dept: ELECTROPHYSIOLOGY | Facility: CLINIC | Age: 74
End: 2024-10-03
Payer: MEDICARE

## 2024-10-03 PROCEDURE — 93296 REM INTERROG EVL PM/IDS: CPT

## 2024-10-03 PROCEDURE — 93295 DEV INTERROG REMOTE 1/2/MLT: CPT

## 2024-11-04 NOTE — ED ADULT NURSE NOTE - NSFALLRSKASSESSTYPE_ED_ALL_ED
Patient notified of pathology results and recommendation. Verbalizes understanding. States having some discomfort, denies needing analgesics.  Denies signs and symptoms of infection. Questions answered, support given, verbalized understanding. Patient transferred to [facility] scheduling per request to schedule recommended follow-up.  
Initial (On Arrival)

## 2025-01-03 ENCOUNTER — APPOINTMENT (OUTPATIENT)
Dept: ELECTROPHYSIOLOGY | Facility: CLINIC | Age: 75
End: 2025-01-03

## 2025-04-21 NOTE — ED PROVIDER NOTE - PATIENT'S PREFERRED PRONOUN
Statement of Certifying Physician for Therapeutic Shoes form,  forms received and have been placed in provider's mailbox    Company/Facility: Dr. Davon Bullock [facility]    The patient has signed an authorization form: No, reason: None needed    Have you communicated the forms completion process may take up to 14 days? No   Her/She

## 2025-06-25 NOTE — H&P ADULT - PROBLEM/PLAN-7
Pts wife called to cancel 07/01/25 appt  at 2:15, would like to reschedule.       Call back number:   [phone]    DISPLAY PLAN FREE TEXT

## 2025-07-02 ENCOUNTER — APPOINTMENT (OUTPATIENT)
Dept: ELECTROPHYSIOLOGY | Facility: CLINIC | Age: 75
End: 2025-07-02

## 2025-07-25 NOTE — PROVIDER CONTACT NOTE (OTHER) - REASON
Chest Pain
Chest Pain/Beats of Vtach
Patient complaining of left upper chest pain and shoulder around ICD site.
 used

## 2025-08-04 NOTE — PATIENT PROFILE ADULT - FUNCTIONAL ASSESSMENT - DAILY ACTIVITY 6.
Subjective     Hoa Keith is a 71 y.o. female who presents for the following: Suspicious Skin Lesion (L shoulder x 1 year. Pt states discomfort to site. Pt denies treatment to site. Patient denies personal history of NMSC. ).     Review of Systems:  No other skin or systemic complaints other than what is documented elsewhere in the note.    The following portions of the chart were reviewed this encounter and updated as appropriate:   Allergies  Meds  Problems  Med Hx  Surg Hx  Fam Hx              Objective   Well appearing patient in no apparent distress; mood and affect are within normal limits.    A focused skin examination was performed. All findings within normal limits unless otherwise noted below.    Assessment/Plan   Skin Exam  1. NEOPLASM OF UNCERTAIN BEHAVIOR OF SKIN  Left Upper Back  1.3cm pearly plaque    - Shave removal    Lesion diameter (cm):  1.3  Informed consent: discussed and consent obtained    Timeout: patient name, date of birth, surgical site, and procedure verified    Procedure prep:  Patient was prepped and draped  Anesthesia: the lesion was anesthetized in a standard fashion    Anesthetic:  1% lidocaine w/ epinephrine 1-100,000 local infiltration  Instrument used: DermaBlade    Hemostasis achieved with: aluminum chloride    Outcome: patient tolerated procedure well    Post-procedure details: sterile dressing applied and wound care instructions given    Dressing type: bandage and petrolatum      - Staff Communication: Dermatology Local Anesthesia: Site Location: L upper back 1 % Lidocaine / Epinephrine - Amount: 0.5cc  Specimen 1 - Dermatopathology- DERM LAB  Differential Diagnosis: r/o BCC  Check Margins Yes/No?:    Comments:    Dermpath Lab: Routine Histopathology (formalin-fixed tissue)  2. FISSURE IN SKIN  Left Upper Back  Scattered punctate excoriations on the upper back  (lesions of patient's concern)  -Discussed nature of diagnosis  -Discussed these scabs should heal if  manipulation is discontinued  3. DERMATOLOGIC PROBLEM  Generalized  This Visit  - Follow Up In Dermatology - Established Patient    Follow up in 2-3 months for FSE  Discussed if there are any changes or development of concerning symptoms (lesion/skin condition is changing, bleeding, enlarging, or worsening) the patient is to contact my office. The patient verbalizes understanding.    Eva Ren MD  8/4/2025     4 = No assist / stand by assistance